# Patient Record
Sex: MALE | Race: BLACK OR AFRICAN AMERICAN | NOT HISPANIC OR LATINO
[De-identification: names, ages, dates, MRNs, and addresses within clinical notes are randomized per-mention and may not be internally consistent; named-entity substitution may affect disease eponyms.]

---

## 2017-06-23 ENCOUNTER — APPOINTMENT (OUTPATIENT)
Dept: INTERNAL MEDICINE | Facility: CLINIC | Age: 67
End: 2017-06-23

## 2017-06-23 ENCOUNTER — NON-APPOINTMENT (OUTPATIENT)
Age: 67
End: 2017-06-23

## 2017-06-23 VITALS
BODY MASS INDEX: 28.99 KG/M2 | HEIGHT: 72 IN | OXYGEN SATURATION: 94 % | WEIGHT: 214 LBS | TEMPERATURE: 98.1 F | HEART RATE: 74 BPM | RESPIRATION RATE: 12 BRPM | DIASTOLIC BLOOD PRESSURE: 93 MMHG | SYSTOLIC BLOOD PRESSURE: 174 MMHG

## 2017-06-23 LAB
ALBUMIN SERPL ELPH-MCNC: 4.3 G/DL
ALP BLD-CCNC: 85 U/L
ALT SERPL-CCNC: 30 U/L
ANION GAP SERPL CALC-SCNC: 12 MMOL/L
AST SERPL-CCNC: 31 U/L
BILIRUB SERPL-MCNC: 0.4 MG/DL
BUN SERPL-MCNC: 14 MG/DL
CALCIUM SERPL-MCNC: 9.5 MG/DL
CHLORIDE SERPL-SCNC: 102 MMOL/L
CO2 SERPL-SCNC: 27 MMOL/L
CREAT SERPL-MCNC: 0.97 MG/DL
GLUCOSE SERPL-MCNC: 105 MG/DL
POTASSIUM SERPL-SCNC: 5.2 MMOL/L
PROT SERPL-MCNC: 7.5 G/DL
SODIUM SERPL-SCNC: 141 MMOL/L

## 2017-06-26 LAB — TSH SERPL-ACNC: 2.18 UIU/ML

## 2017-12-22 ENCOUNTER — APPOINTMENT (OUTPATIENT)
Dept: INTERNAL MEDICINE | Facility: CLINIC | Age: 67
End: 2017-12-22
Payer: COMMERCIAL

## 2017-12-22 VITALS
RESPIRATION RATE: 12 BRPM | HEART RATE: 82 BPM | SYSTOLIC BLOOD PRESSURE: 188 MMHG | BODY MASS INDEX: 28.44 KG/M2 | TEMPERATURE: 97.9 F | WEIGHT: 210 LBS | OXYGEN SATURATION: 97 % | DIASTOLIC BLOOD PRESSURE: 112 MMHG | HEIGHT: 72 IN

## 2017-12-22 DIAGNOSIS — Z87.09 PERSONAL HISTORY OF OTHER DISEASES OF THE RESPIRATORY SYSTEM: ICD-10-CM

## 2017-12-22 PROCEDURE — 99214 OFFICE O/P EST MOD 30 MIN: CPT

## 2017-12-29 ENCOUNTER — APPOINTMENT (OUTPATIENT)
Dept: INTERNAL MEDICINE | Facility: CLINIC | Age: 67
End: 2017-12-29

## 2018-01-19 ENCOUNTER — NON-APPOINTMENT (OUTPATIENT)
Age: 68
End: 2018-01-19

## 2018-01-19 ENCOUNTER — APPOINTMENT (OUTPATIENT)
Dept: INTERNAL MEDICINE | Facility: CLINIC | Age: 68
End: 2018-01-19
Payer: COMMERCIAL

## 2018-01-19 VITALS
TEMPERATURE: 97.9 F | RESPIRATION RATE: 12 BRPM | DIASTOLIC BLOOD PRESSURE: 109 MMHG | HEIGHT: 72 IN | BODY MASS INDEX: 28.44 KG/M2 | SYSTOLIC BLOOD PRESSURE: 185 MMHG | OXYGEN SATURATION: 98 % | HEART RATE: 86 BPM | WEIGHT: 210 LBS

## 2018-01-19 PROCEDURE — 99214 OFFICE O/P EST MOD 30 MIN: CPT

## 2018-01-19 PROCEDURE — 93000 ELECTROCARDIOGRAM COMPLETE: CPT

## 2018-01-20 LAB
BASOPHILS # BLD AUTO: 0.02 K/UL
BASOPHILS NFR BLD AUTO: 0.5 %
EOSINOPHIL # BLD AUTO: 0.08 K/UL
EOSINOPHIL NFR BLD AUTO: 1.9 %
HCT VFR BLD CALC: 42.7 %
HGB BLD-MCNC: 14.3 G/DL
IMM GRANULOCYTES NFR BLD AUTO: 0 %
LYMPHOCYTES # BLD AUTO: 1.73 K/UL
LYMPHOCYTES NFR BLD AUTO: 41.1 %
MAN DIFF?: NORMAL
MCHC RBC-ENTMCNC: 29.9 PG
MCHC RBC-ENTMCNC: 33.5 GM/DL
MCV RBC AUTO: 89.1 FL
MONOCYTES # BLD AUTO: 0.58 K/UL
MONOCYTES NFR BLD AUTO: 13.8 %
NEUTROPHILS # BLD AUTO: 1.8 K/UL
NEUTROPHILS NFR BLD AUTO: 42.7 %
PLATELET # BLD AUTO: 188 K/UL
RBC # BLD: 4.79 M/UL
RBC # FLD: 15 %
WBC # FLD AUTO: 4.21 K/UL

## 2018-01-22 LAB
25(OH)D3 SERPL-MCNC: 10.1 NG/ML
ALBUMIN SERPL ELPH-MCNC: 4.4 G/DL
ALP BLD-CCNC: 73 U/L
ALT SERPL-CCNC: 57 U/L
ANION GAP SERPL CALC-SCNC: 13 MMOL/L
APPEARANCE: ABNORMAL
AST SERPL-CCNC: 63 U/L
BACTERIA: NEGATIVE
BILIRUB SERPL-MCNC: 0.6 MG/DL
BILIRUBIN URINE: NEGATIVE
BLOOD URINE: NEGATIVE
BUN SERPL-MCNC: 12 MG/DL
CALCIUM SERPL-MCNC: 9.8 MG/DL
CHLORIDE SERPL-SCNC: 101 MMOL/L
CHOLEST SERPL-MCNC: 306 MG/DL
CHOLEST/HDLC SERPL: 2.9 RATIO
CO2 SERPL-SCNC: 29 MMOL/L
COLOR: ABNORMAL
CREAT SERPL-MCNC: 0.9 MG/DL
GLUCOSE QUALITATIVE U: NEGATIVE MG/DL
GLUCOSE SERPL-MCNC: 111 MG/DL
HBA1C MFR BLD HPLC: 5.8 %
HDLC SERPL-MCNC: 105 MG/DL
HYALINE CASTS: 2 /LPF
KETONES URINE: NEGATIVE
LDLC SERPL CALC-MCNC: 187 MG/DL
LEUKOCYTE ESTERASE URINE: NEGATIVE
MICROSCOPIC-UA: NORMAL
NITRITE URINE: NEGATIVE
PH URINE: 6
POTASSIUM SERPL-SCNC: 4.4 MMOL/L
PROT SERPL-MCNC: 7.8 G/DL
PROTEIN URINE: ABNORMAL MG/DL
PSA SERPL-MCNC: 1.18 NG/ML
RED BLOOD CELLS URINE: 3 /HPF
SODIUM SERPL-SCNC: 143 MMOL/L
SPECIFIC GRAVITY URINE: 1.03
SQUAMOUS EPITHELIAL CELLS: 1 /HPF
TRIGL SERPL-MCNC: 68 MG/DL
TSH SERPL-ACNC: 2.13 UIU/ML
UROBILINOGEN URINE: NEGATIVE MG/DL
VIT B12 SERPL-MCNC: 580 PG/ML
WHITE BLOOD CELLS URINE: 1 /HPF

## 2018-02-07 ENCOUNTER — APPOINTMENT (OUTPATIENT)
Dept: CARDIOLOGY | Facility: CLINIC | Age: 68
End: 2018-02-07
Payer: COMMERCIAL

## 2018-02-07 ENCOUNTER — NON-APPOINTMENT (OUTPATIENT)
Age: 68
End: 2018-02-07

## 2018-02-07 ENCOUNTER — APPOINTMENT (OUTPATIENT)
Dept: INTERNAL MEDICINE | Facility: CLINIC | Age: 68
End: 2018-02-07
Payer: COMMERCIAL

## 2018-02-07 VITALS
WEIGHT: 211 LBS | DIASTOLIC BLOOD PRESSURE: 90 MMHG | OXYGEN SATURATION: 96 % | SYSTOLIC BLOOD PRESSURE: 163 MMHG | HEART RATE: 66 BPM | BODY MASS INDEX: 28.58 KG/M2 | TEMPERATURE: 97.8 F | RESPIRATION RATE: 12 BRPM | HEIGHT: 72 IN

## 2018-02-07 VITALS — DIASTOLIC BLOOD PRESSURE: 90 MMHG | HEART RATE: 54 BPM | SYSTOLIC BLOOD PRESSURE: 150 MMHG

## 2018-02-07 PROCEDURE — 93306 TTE W/DOPPLER COMPLETE: CPT

## 2018-02-07 PROCEDURE — 99244 OFF/OP CNSLTJ NEW/EST MOD 40: CPT | Mod: 25

## 2018-02-07 PROCEDURE — 93000 ELECTROCARDIOGRAM COMPLETE: CPT

## 2018-02-07 PROCEDURE — 99214 OFFICE O/P EST MOD 30 MIN: CPT

## 2018-02-15 LAB
ALBUMIN SERPL ELPH-MCNC: 4.4 G/DL
ALP BLD-CCNC: 79 U/L
ALT SERPL-CCNC: 36 U/L
ANION GAP SERPL CALC-SCNC: 16 MMOL/L
AST SERPL-CCNC: 31 U/L
BILIRUB SERPL-MCNC: 0.5 MG/DL
BUN SERPL-MCNC: 12 MG/DL
CALCIUM SERPL-MCNC: 10 MG/DL
CHLORIDE SERPL-SCNC: 100 MMOL/L
CO2 SERPL-SCNC: 25 MMOL/L
CREAT SERPL-MCNC: 0.97 MG/DL
GLUCOSE SERPL-MCNC: 106 MG/DL
HAV IGM SER QL: NONREACTIVE
HBV CORE IGM SER QL: NONREACTIVE
HBV SURFACE AB SER QL: REACTIVE
HBV SURFACE AG SER QL: NONREACTIVE
HCV AB SER QL: NONREACTIVE
HCV S/CO RATIO: 0.11 S/CO
POTASSIUM SERPL-SCNC: 4.6 MMOL/L
PROT SERPL-MCNC: 7.3 G/DL
SODIUM SERPL-SCNC: 141 MMOL/L

## 2018-02-16 ENCOUNTER — APPOINTMENT (OUTPATIENT)
Dept: CARDIOLOGY | Facility: CLINIC | Age: 68
End: 2018-02-16
Payer: COMMERCIAL

## 2018-02-16 PROCEDURE — 93978 VASCULAR STUDY: CPT

## 2018-02-23 ENCOUNTER — APPOINTMENT (OUTPATIENT)
Dept: INTERNAL MEDICINE | Facility: CLINIC | Age: 68
End: 2018-02-23
Payer: COMMERCIAL

## 2018-02-23 VITALS
SYSTOLIC BLOOD PRESSURE: 162 MMHG | HEART RATE: 77 BPM | HEIGHT: 72 IN | TEMPERATURE: 98 F | DIASTOLIC BLOOD PRESSURE: 87 MMHG | RESPIRATION RATE: 12 BRPM | OXYGEN SATURATION: 99 %

## 2018-02-23 DIAGNOSIS — M25.552 PAIN IN LEFT HIP: ICD-10-CM

## 2018-02-23 PROCEDURE — 99214 OFFICE O/P EST MOD 30 MIN: CPT

## 2018-02-23 RX ORDER — BENZONATATE 200 MG/1
200 CAPSULE ORAL
Qty: 20 | Refills: 0 | Status: DISCONTINUED | COMMUNITY
Start: 2017-12-22 | End: 2018-02-23

## 2018-02-23 RX ORDER — FLUTICASONE PROPIONATE 50 UG/1
50 SPRAY, METERED NASAL DAILY
Qty: 1 | Refills: 3 | Status: DISCONTINUED | COMMUNITY
Start: 2017-12-22 | End: 2018-02-23

## 2018-02-23 RX ORDER — LEVOFLOXACIN 500 MG/1
500 TABLET, FILM COATED ORAL DAILY
Qty: 10 | Refills: 0 | Status: DISCONTINUED | COMMUNITY
Start: 2017-06-23 | End: 2018-02-23

## 2018-02-23 RX ORDER — BENZONATATE 200 MG/1
200 CAPSULE ORAL
Qty: 20 | Refills: 0 | Status: DISCONTINUED | COMMUNITY
Start: 2017-06-23 | End: 2018-02-23

## 2018-02-23 RX ORDER — LEVOFLOXACIN 500 MG/1
500 TABLET, FILM COATED ORAL
Qty: 7 | Refills: 0 | Status: DISCONTINUED | COMMUNITY
Start: 2017-12-22 | End: 2018-02-23

## 2018-03-05 ENCOUNTER — MEDICATION RENEWAL (OUTPATIENT)
Age: 68
End: 2018-03-05

## 2018-03-05 LAB
ANION GAP SERPL CALC-SCNC: 10 MMOL/L
BUN SERPL-MCNC: 11 MG/DL
CALCIUM SERPL-MCNC: 9.7 MG/DL
CHLORIDE SERPL-SCNC: 100 MMOL/L
CO2 SERPL-SCNC: 29 MMOL/L
CREAT SERPL-MCNC: 0.97 MG/DL
GLUCOSE SERPL-MCNC: 105 MG/DL
POTASSIUM SERPL-SCNC: 4 MMOL/L
SODIUM SERPL-SCNC: 139 MMOL/L

## 2018-03-07 ENCOUNTER — MEDICATION RENEWAL (OUTPATIENT)
Age: 68
End: 2018-03-07

## 2018-04-11 ENCOUNTER — RX RENEWAL (OUTPATIENT)
Age: 68
End: 2018-04-11

## 2018-04-20 ENCOUNTER — RX RENEWAL (OUTPATIENT)
Age: 68
End: 2018-04-20

## 2018-04-25 ENCOUNTER — RX RENEWAL (OUTPATIENT)
Age: 68
End: 2018-04-25

## 2018-05-16 ENCOUNTER — RX RENEWAL (OUTPATIENT)
Age: 68
End: 2018-05-16

## 2018-05-16 ENCOUNTER — MEDICATION RENEWAL (OUTPATIENT)
Age: 68
End: 2018-05-16

## 2018-06-20 ENCOUNTER — MEDICATION RENEWAL (OUTPATIENT)
Age: 68
End: 2018-06-20

## 2018-07-04 ENCOUNTER — NON-APPOINTMENT (OUTPATIENT)
Age: 68
End: 2018-07-04

## 2018-07-05 ENCOUNTER — APPOINTMENT (OUTPATIENT)
Dept: CARDIOLOGY | Facility: CLINIC | Age: 68
End: 2018-07-05

## 2018-07-05 ENCOUNTER — APPOINTMENT (OUTPATIENT)
Dept: INTERNAL MEDICINE | Facility: CLINIC | Age: 68
End: 2018-07-05

## 2018-07-16 ENCOUNTER — RX RENEWAL (OUTPATIENT)
Age: 68
End: 2018-07-16

## 2018-07-26 ENCOUNTER — RX RENEWAL (OUTPATIENT)
Age: 68
End: 2018-07-26

## 2018-08-20 ENCOUNTER — MEDICATION RENEWAL (OUTPATIENT)
Age: 68
End: 2018-08-20

## 2018-10-24 ENCOUNTER — MEDICATION RENEWAL (OUTPATIENT)
Age: 68
End: 2018-10-24

## 2018-12-07 ENCOUNTER — APPOINTMENT (OUTPATIENT)
Dept: INTERNAL MEDICINE | Facility: CLINIC | Age: 68
End: 2018-12-07
Payer: COMMERCIAL

## 2018-12-07 VITALS
OXYGEN SATURATION: 96 % | TEMPERATURE: 97.6 F | SYSTOLIC BLOOD PRESSURE: 140 MMHG | WEIGHT: 215 LBS | BODY MASS INDEX: 29.12 KG/M2 | RESPIRATION RATE: 12 BRPM | HEART RATE: 95 BPM | DIASTOLIC BLOOD PRESSURE: 90 MMHG | HEIGHT: 72 IN

## 2018-12-07 PROCEDURE — 99214 OFFICE O/P EST MOD 30 MIN: CPT

## 2018-12-24 NOTE — REVIEW OF SYSTEMS
[Nasal Discharge] : nasal discharge [Sore Throat] : sore throat [Shortness Of Breath] : no shortness of breath [Wheezing] : no wheezing [Cough] : cough [Joint Pain] : joint pain [Negative] : Heme/Lymph

## 2018-12-24 NOTE — PHYSICAL EXAM
[No Acute Distress] : no acute distress [Well Nourished] : well nourished [Well Developed] : well developed [Well-Appearing] : well-appearing [Normal Sclera/Conjunctiva] : normal sclera/conjunctiva [EOMI] : extraocular movements intact [Normal Outer Ear/Nose] : the outer ears and nose were normal in appearance [Normal Oropharynx] : the oropharynx was normal [Normal TMs] : both tympanic membranes were normal [No JVD] : no jugular venous distention [Supple] : supple [No Lymphadenopathy] : no lymphadenopathy [No Respiratory Distress] : no respiratory distress  [No Accessory Muscle Use] : no accessory muscle use [Normal Rate] : normal rate  [Regular Rhythm] : with a regular rhythm [Normal S1, S2] : normal S1 and S2 [No Murmur] : no murmur heard [No Carotid Bruits] : no carotid bruits [Pedal Pulses Present] : the pedal pulses are present [No Edema] : there was no peripheral edema [Soft] : abdomen soft [Non Tender] : non-tender [Non-distended] : non-distended [No Masses] : no abdominal mass palpated [No HSM] : no HSM [Normal Bowel Sounds] : normal bowel sounds [Normal Posterior Cervical Nodes] : no posterior cervical lymphadenopathy [Normal Anterior Cervical Nodes] : no anterior cervical lymphadenopathy [No CVA Tenderness] : no CVA  tenderness [No Spinal Tenderness] : no spinal tenderness [No Joint Swelling] : no joint swelling [No Rash] : no rash [Normal Gait] : normal gait [No Focal Deficits] : no focal deficits [Normal Affect] : the affect was normal [Normal Insight/Judgement] : insight and judgment were intact [de-identified] : lynn PORTER  [de-identified] : left ankle in boot

## 2018-12-24 NOTE — HISTORY OF PRESENT ILLNESS
[de-identified] : 68 year old male with h/o Hypertension presents with acute URI x 1 week: + sore throat, dry cough ,nasal congestion, + associated low grade fever> symptoms getting worse at night .Takes OTC decongestant without much improvement. \par Pt recovering from Rt ankle fracture May 2018 currently in  boot .

## 2018-12-24 NOTE — PLAN
[FreeTextEntry1] : 1. see plan\par 2. Hypertension\par continue Amlodipine 5 mg QD\par Losartan 25 mg QD

## 2018-12-24 NOTE — COUNSELING
[Weight management counseling provided] : Weight management [Healthy eating counseling provided] : healthy eating [Activity counseling provided] : activity [Low Salt Diet] : Low salt diet

## 2019-01-25 ENCOUNTER — APPOINTMENT (OUTPATIENT)
Dept: INTERNAL MEDICINE | Facility: CLINIC | Age: 69
End: 2019-01-25
Payer: COMMERCIAL

## 2019-01-25 VITALS
DIASTOLIC BLOOD PRESSURE: 84 MMHG | OXYGEN SATURATION: 98 % | WEIGHT: 215 LBS | HEART RATE: 104 BPM | HEIGHT: 72 IN | TEMPERATURE: 97.8 F | BODY MASS INDEX: 29.12 KG/M2 | SYSTOLIC BLOOD PRESSURE: 154 MMHG | RESPIRATION RATE: 14 BRPM

## 2019-01-25 DIAGNOSIS — J06.9 ACUTE UPPER RESPIRATORY INFECTION, UNSPECIFIED: ICD-10-CM

## 2019-01-25 DIAGNOSIS — J45.909 UNSPECIFIED ASTHMA, UNCOMPLICATED: ICD-10-CM

## 2019-01-25 PROCEDURE — 99214 OFFICE O/P EST MOD 30 MIN: CPT

## 2019-01-25 NOTE — PHYSICAL EXAM
[No Acute Distress] : no acute distress [Well Nourished] : well nourished [Well Developed] : well developed [Normal Sclera/Conjunctiva] : normal sclera/conjunctiva [EOMI] : extraocular movements intact [Normal Outer Ear/Nose] : the outer ears and nose were normal in appearance [Normal TMs] : both tympanic membranes were normal [No JVD] : no jugular venous distention [Supple] : supple [No Lymphadenopathy] : no lymphadenopathy [No Respiratory Distress] : no respiratory distress  [No Accessory Muscle Use] : no accessory muscle use [Normal Rate] : normal rate  [Regular Rhythm] : with a regular rhythm [Normal S1, S2] : normal S1 and S2 [No Murmur] : no murmur heard [No Carotid Bruits] : no carotid bruits [Pedal Pulses Present] : the pedal pulses are present [No Edema] : there was no peripheral edema [Soft] : abdomen soft [Non Tender] : non-tender [Non-distended] : non-distended [No Masses] : no abdominal mass palpated [No HSM] : no HSM [Normal Bowel Sounds] : normal bowel sounds [Normal Posterior Cervical Nodes] : no posterior cervical lymphadenopathy [Normal Anterior Cervical Nodes] : no anterior cervical lymphadenopathy [No CVA Tenderness] : no CVA  tenderness [No Spinal Tenderness] : no spinal tenderness [No Joint Swelling] : no joint swelling [No Rash] : no rash [Normal Gait] : normal gait [No Focal Deficits] : no focal deficits [Normal Affect] : the affect was normal [Normal Insight/Judgement] : insight and judgment were intact [de-identified] : ill loooking [de-identified] : + throat erythema  [de-identified] : + scattered rhonchi/ wheezing  [de-identified] : left ankle in boot

## 2019-01-25 NOTE — REVIEW OF SYSTEMS
[Nasal Discharge] : nasal discharge [Shortness Of Breath] : no shortness of breath [Wheezing] : wheezing [Cough] : cough [Joint Pain] : joint pain [Negative] : Heme/Lymph

## 2019-01-25 NOTE — HISTORY OF PRESENT ILLNESS
[de-identified] : 68 year old male with hypertension presents with acute URI x 1 week : + cough productive of dark sputum associated with chest tightness/ wheezing, + associated body aches, low grade fever, chills, runny nose . He takes Tylenol prn without much improvement. Symptoms getting worse at night. He denies SOB, N, V, abdominal pain, CP

## 2019-04-12 ENCOUNTER — MEDICATION RENEWAL (OUTPATIENT)
Age: 69
End: 2019-04-12

## 2019-06-24 ENCOUNTER — CLINICAL ADVICE (OUTPATIENT)
Age: 69
End: 2019-06-24

## 2019-10-03 ENCOUNTER — APPOINTMENT (OUTPATIENT)
Dept: INTERNAL MEDICINE | Facility: CLINIC | Age: 69
End: 2019-10-03
Payer: COMMERCIAL

## 2019-10-03 ENCOUNTER — CHART COPY (OUTPATIENT)
Age: 69
End: 2019-10-03

## 2019-10-03 ENCOUNTER — NON-APPOINTMENT (OUTPATIENT)
Age: 69
End: 2019-10-03

## 2019-10-03 VITALS
BODY MASS INDEX: 28.04 KG/M2 | HEART RATE: 102 BPM | TEMPERATURE: 98.3 F | OXYGEN SATURATION: 96 % | SYSTOLIC BLOOD PRESSURE: 160 MMHG | WEIGHT: 207 LBS | RESPIRATION RATE: 14 BRPM | HEIGHT: 72 IN | DIASTOLIC BLOOD PRESSURE: 102 MMHG

## 2019-10-03 PROCEDURE — 99214 OFFICE O/P EST MOD 30 MIN: CPT

## 2019-10-03 RX ORDER — MOMETASONE 50 UG/1
50 SPRAY, METERED NASAL DAILY
Qty: 1 | Refills: 1 | Status: DISCONTINUED | COMMUNITY
Start: 2017-06-23 | End: 2019-10-03

## 2019-10-03 RX ORDER — AMOXICILLIN AND CLAVULANATE POTASSIUM 875; 125 MG/1; MG/1
875-125 TABLET, COATED ORAL
Qty: 14 | Refills: 0 | Status: DISCONTINUED | COMMUNITY
Start: 2018-12-07 | End: 2019-10-03

## 2019-10-03 RX ORDER — AMOXICILLIN AND CLAVULANATE POTASSIUM 875; 125 MG/1; MG/1
875-125 TABLET, COATED ORAL
Qty: 14 | Refills: 0 | Status: DISCONTINUED | COMMUNITY
Start: 2019-01-25 | End: 2019-10-03

## 2019-10-03 RX ORDER — MELOXICAM 7.5 MG/1
7.5 TABLET ORAL DAILY
Qty: 90 | Refills: 0 | Status: DISCONTINUED | COMMUNITY
Start: 2018-02-23 | End: 2019-10-03

## 2019-10-03 RX ORDER — METHYLPREDNISOLONE 4 MG/1
4 TABLET ORAL
Qty: 1 | Refills: 0 | Status: DISCONTINUED | COMMUNITY
Start: 2019-01-25 | End: 2019-10-03

## 2019-10-14 DIAGNOSIS — R79.89 OTHER SPECIFIED ABNORMAL FINDINGS OF BLOOD CHEMISTRY: ICD-10-CM

## 2019-10-14 LAB
25(OH)D3 SERPL-MCNC: 11.5 NG/ML
ALBUMIN SERPL ELPH-MCNC: 4.6 G/DL
ALP BLD-CCNC: 80 U/L
ALT SERPL-CCNC: 30 U/L
ANION GAP SERPL CALC-SCNC: 17 MMOL/L
APPEARANCE: CLEAR
AST SERPL-CCNC: 41 U/L
BACTERIA: NEGATIVE
BASOPHILS # BLD AUTO: 0.04 K/UL
BASOPHILS NFR BLD AUTO: 1 %
BILIRUB SERPL-MCNC: 0.6 MG/DL
BILIRUBIN URINE: NEGATIVE
BLOOD URINE: NORMAL
BUN SERPL-MCNC: 10 MG/DL
CALCIUM SERPL-MCNC: 9.7 MG/DL
CHLORIDE SERPL-SCNC: 98 MMOL/L
CHOLEST SERPL-MCNC: 329 MG/DL
CHOLEST/HDLC SERPL: 3.1 RATIO
CO2 SERPL-SCNC: 25 MMOL/L
COLOR: YELLOW
CREAT SERPL-MCNC: 0.8 MG/DL
EOSINOPHIL # BLD AUTO: 0.1 K/UL
EOSINOPHIL NFR BLD AUTO: 2.4 %
ESTIMATED AVERAGE GLUCOSE: 120 MG/DL
GLUCOSE QUALITATIVE U: NEGATIVE
GLUCOSE SERPL-MCNC: 117 MG/DL
HBA1C MFR BLD HPLC: 5.8 %
HCT VFR BLD CALC: 44.9 %
HDLC SERPL-MCNC: 108 MG/DL
HGB BLD-MCNC: 14.6 G/DL
HYALINE CASTS: 0 /LPF
IMM GRANULOCYTES NFR BLD AUTO: 0.2 %
KETONES URINE: NEGATIVE
LDLC SERPL CALC-MCNC: 193 MG/DL
LEUKOCYTE ESTERASE URINE: NEGATIVE
LYMPHOCYTES # BLD AUTO: 1.82 K/UL
LYMPHOCYTES NFR BLD AUTO: 43.8 %
MAN DIFF?: NORMAL
MCHC RBC-ENTMCNC: 30.2 PG
MCHC RBC-ENTMCNC: 32.5 GM/DL
MCV RBC AUTO: 93 FL
MICROSCOPIC-UA: NORMAL
MONOCYTES # BLD AUTO: 0.58 K/UL
MONOCYTES NFR BLD AUTO: 13.9 %
NEUTROPHILS # BLD AUTO: 1.61 K/UL
NEUTROPHILS NFR BLD AUTO: 38.7 %
NITRITE URINE: NEGATIVE
PH URINE: 6
PLATELET # BLD AUTO: 218 K/UL
POTASSIUM SERPL-SCNC: 4.2 MMOL/L
PROT SERPL-MCNC: 7.3 G/DL
PROTEIN URINE: NORMAL
PSA SERPL-MCNC: 1.36 NG/ML
RBC # BLD: 4.83 M/UL
RBC # FLD: 13.6 %
RED BLOOD CELLS URINE: 3 /HPF
SODIUM SERPL-SCNC: 140 MMOL/L
SPECIFIC GRAVITY URINE: 1.02
SQUAMOUS EPITHELIAL CELLS: 0 /HPF
TRIGL SERPL-MCNC: 140 MG/DL
TSH SERPL-ACNC: 1.93 UIU/ML
UROBILINOGEN URINE: NORMAL
VIT B12 SERPL-MCNC: 265 PG/ML
WBC # FLD AUTO: 4.16 K/UL
WHITE BLOOD CELLS URINE: 0 /HPF

## 2019-10-14 RX ORDER — CHOLECALCIFEROL (VITAMIN D3) 1250 MCG
1.25 MG CAPSULE ORAL
Qty: 6 | Refills: 0 | Status: ACTIVE | COMMUNITY
Start: 2019-10-14 | End: 1900-01-01

## 2019-10-15 NOTE — PHYSICAL EXAM
[No Acute Distress] : no acute distress [Well Nourished] : well nourished [Well Developed] : well developed [Well-Appearing] : well-appearing [Normal Sclera/Conjunctiva] : normal sclera/conjunctiva [EOMI] : extraocular movements intact [Normal Outer Ear/Nose] : the outer ears and nose were normal in appearance [Normal Oropharynx] : the oropharynx was normal [No JVD] : no jugular venous distention [No Lymphadenopathy] : no lymphadenopathy [Supple] : supple [Thyroid Normal, No Nodules] : the thyroid was normal and there were no nodules present [No Respiratory Distress] : no respiratory distress  [No Accessory Muscle Use] : no accessory muscle use [Clear to Auscultation] : lungs were clear to auscultation bilaterally [Normal Rate] : normal rate  [Regular Rhythm] : with a regular rhythm [No Murmur] : no murmur heard [Normal S1, S2] : normal S1 and S2 [No Carotid Bruits] : no carotid bruits [Pedal Pulses Present] : the pedal pulses are present [No Edema] : there was no peripheral edema [Soft] : abdomen soft [Non Tender] : non-tender [Non-distended] : non-distended [No Masses] : no abdominal mass palpated [No HSM] : no HSM [Normal Bowel Sounds] : normal bowel sounds [Normal Posterior Cervical Nodes] : no posterior cervical lymphadenopathy [Normal Anterior Cervical Nodes] : no anterior cervical lymphadenopathy [No CVA Tenderness] : no CVA  tenderness [No Spinal Tenderness] : no spinal tenderness [No Joint Swelling] : no joint swelling [No Rash] : no rash [Grossly Normal Strength/Tone] : grossly normal strength/tone [Coordination Grossly Intact] : coordination grossly intact [No Focal Deficits] : no focal deficits [Normal Gait] : normal gait [Normal Insight/Judgement] : insight and judgment were intact [Normal Affect] : the affect was normal

## 2019-10-15 NOTE — HISTORY OF PRESENT ILLNESS
[de-identified] : 69 year old male with Hypertension / Abn LFT/ Seasonal allergies  presents for follow up. \par He is not compliant with regular follow ups.\par He reports feeling well, denies CP/SOB, dizziness, exertional symptoms\par Last colonoscopy  4-5 years ago

## 2019-11-02 ENCOUNTER — APPOINTMENT (OUTPATIENT)
Dept: CARDIOLOGY | Facility: CLINIC | Age: 69
End: 2019-11-02

## 2019-11-04 RX ORDER — CHOLECALCIFEROL (VITAMIN D3) 50 MCG
50 MCG TABLET ORAL
Qty: 90 | Refills: 0 | Status: ACTIVE | COMMUNITY
Start: 2019-11-04 | End: 1900-01-01

## 2019-11-25 ENCOUNTER — MEDICATION RENEWAL (OUTPATIENT)
Age: 69
End: 2019-11-25

## 2020-04-15 ENCOUNTER — APPOINTMENT (OUTPATIENT)
Dept: INTERNAL MEDICINE | Facility: CLINIC | Age: 70
End: 2020-04-15

## 2020-04-21 ENCOUNTER — RX CHANGE (OUTPATIENT)
Age: 70
End: 2020-04-21

## 2020-04-21 RX ORDER — LOSARTAN POTASSIUM 50 MG/1
50 TABLET, FILM COATED ORAL DAILY
Qty: 90 | Refills: 0 | Status: DISCONTINUED | COMMUNITY
Start: 2018-01-19 | End: 2020-04-21

## 2020-05-19 ENCOUNTER — RX RENEWAL (OUTPATIENT)
Age: 70
End: 2020-05-19

## 2020-05-21 ENCOUNTER — RX CHANGE (OUTPATIENT)
Age: 70
End: 2020-05-21

## 2020-06-11 ENCOUNTER — APPOINTMENT (OUTPATIENT)
Dept: INTERNAL MEDICINE | Facility: CLINIC | Age: 70
End: 2020-06-11
Payer: COMMERCIAL

## 2020-06-11 PROCEDURE — 99214 OFFICE O/P EST MOD 30 MIN: CPT | Mod: 95

## 2020-06-11 RX ORDER — MOMETASONE 50 UG/1
50 SPRAY, METERED NASAL DAILY
Qty: 1 | Refills: 0 | Status: DISCONTINUED | COMMUNITY
Start: 2019-01-25 | End: 2020-06-11

## 2020-06-11 RX ORDER — ALBUTEROL SULFATE 108 UG/1
108 (90 BASE) AEROSOL, METERED RESPIRATORY (INHALATION)
Qty: 1 | Refills: 1 | Status: DISCONTINUED | COMMUNITY
Start: 2019-01-25 | End: 2020-06-11

## 2020-06-11 RX ORDER — MOMETASONE 50 UG/1
50 SPRAY, METERED NASAL DAILY
Qty: 1 | Refills: 0 | Status: DISCONTINUED | COMMUNITY
Start: 2018-12-07 | End: 2020-06-11

## 2020-06-12 NOTE — PLAN
[FreeTextEntry1] : 1. see plan\par 2. sore throat\par salt water gurgles\par pt instructed to call back if symptoms worsen or no improvement\par pt instructed to come for follow up on Htn and blood work

## 2020-06-12 NOTE — HISTORY OF PRESENT ILLNESS
[Home] : at home, [unfilled] , at the time of the visit. [Medical Office: ([facility])___] : at the medical office located in  [de-identified] : 69 year old male with Hypertension / Abn LFT/ Seasonal allergies  presents for follow up on chronic problems and meds renewal \par He is not compliant with regular follow ups.\par Pt was recently treated for sore throat in [place], still has mild sore throat, denies associated fever, chills, cough \par He  denies CP/SOB, dizziness, exertional symptoms

## 2020-06-17 ENCOUNTER — NON-APPOINTMENT (OUTPATIENT)
Age: 70
End: 2020-06-17

## 2020-06-17 ENCOUNTER — APPOINTMENT (OUTPATIENT)
Dept: INTERNAL MEDICINE | Facility: CLINIC | Age: 70
End: 2020-06-17
Payer: COMMERCIAL

## 2020-06-17 ENCOUNTER — APPOINTMENT (OUTPATIENT)
Dept: PULMONOLOGY | Facility: CLINIC | Age: 70
End: 2020-06-17
Payer: COMMERCIAL

## 2020-06-17 VITALS — SYSTOLIC BLOOD PRESSURE: 150 MMHG | DIASTOLIC BLOOD PRESSURE: 80 MMHG

## 2020-06-17 VITALS
WEIGHT: 208 LBS | RESPIRATION RATE: 14 BRPM | OXYGEN SATURATION: 97 % | SYSTOLIC BLOOD PRESSURE: 149 MMHG | TEMPERATURE: 98.9 F | DIASTOLIC BLOOD PRESSURE: 83 MMHG | HEIGHT: 72 IN | HEART RATE: 102 BPM | BODY MASS INDEX: 28.17 KG/M2

## 2020-06-17 DIAGNOSIS — Z72.89 OTHER PROBLEMS RELATED TO LIFESTYLE: ICD-10-CM

## 2020-06-17 DIAGNOSIS — Z87.09 PERSONAL HISTORY OF OTHER DISEASES OF THE RESPIRATORY SYSTEM: ICD-10-CM

## 2020-06-17 PROCEDURE — 95012 NITRIC OXIDE EXP GAS DETER: CPT

## 2020-06-17 PROCEDURE — 99214 OFFICE O/P EST MOD 30 MIN: CPT | Mod: 25

## 2020-06-17 PROCEDURE — 99214 OFFICE O/P EST MOD 30 MIN: CPT

## 2020-06-17 PROCEDURE — 94060 EVALUATION OF WHEEZING: CPT

## 2020-06-17 PROCEDURE — 94726 PLETHYSMOGRAPHY LUNG VOLUMES: CPT

## 2020-06-17 PROCEDURE — 94729 DIFFUSING CAPACITY: CPT

## 2020-06-17 RX ORDER — CHLORHEXIDINE GLUCONATE, 0.12% ORAL RINSE 1.2 MG/ML
0.12 SOLUTION DENTAL
Qty: 1 | Refills: 0 | Status: ACTIVE | COMMUNITY
Start: 2020-06-17 | End: 1900-01-01

## 2020-06-17 NOTE — HISTORY OF PRESENT ILLNESS
[TextBox_4] : Patient is a 69-year-old noncompliant male with past medical history significant for hypertension high cholesterol who is referred for pulmonary consult. The patient complained of increasing cough shortness of breath and dyspnea on exertion. He currently uses Primatene Mist as his rescue medication. He also complains of sore throat. He denies fevers chills chest pain weight loss or hemoptysis

## 2020-06-17 NOTE — ASSESSMENT
[FreeTextEntry1] : In summary the patient is a 69-year-old male with hypertension cholesterol was referred for consult. Patient's physical exam is within normal limits. The patient underwent a pulmonary function test which revealed normal lung volumes.\par \par FeNO 15PPB\par Patient started on Advair and Ventolin although his symptoms are most likely secondary to non compliance\par CXR ordered\par Follow up in 1 month

## 2020-06-17 NOTE — PHYSICAL EXAM
[No Acute Distress] : no acute distress [Normal Oropharynx] : normal oropharynx [Normal Appearance] : normal appearance [No Neck Mass] : no neck mass [Normal Rate/Rhythm] : normal rate/rhythm [Normal S1, S2] : normal s1, s2 [No Murmurs] : no murmurs [No Resp Distress] : no resp distress [Clear to Auscultation Bilaterally] : clear to auscultation bilaterally [No Abnormalities] : no abnormalities [Benign] : benign [Normal Gait] : normal gait [No Clubbing] : no clubbing [No Cyanosis] : no cyanosis [No Edema] : no edema [FROM] : FROM no [Normal Color/ Pigmentation] : normal color/ pigmentation [No Focal Deficits] : no focal deficits [Oriented x3] : oriented x3 [Normal Affect] : normal affect

## 2020-06-17 NOTE — REVIEW OF SYSTEMS
[Postnasal Drip] : postnasal drip [Cough] : cough [Dyspnea] : dyspnea [A.M. Dry Mouth] : a.m. dry mouth [SOB on Exertion] : sob on exertion [Negative] : Endocrine [Sputum] : no sputum

## 2020-06-22 NOTE — HISTORY OF PRESENT ILLNESS
[de-identified] : 69 year old male with h/o Htn/ Ab LFT/ Seasonal allergies presents for follow up on chronic problems . Also c/o persistent cough associated with SOB, sore throat. He currently uses OTC Primatene Mist . He denies feve, chills, N, V, abdominal pain . He is not compliant with daily BP meds intake

## 2020-06-22 NOTE — PHYSICAL EXAM
[No Acute Distress] : no acute distress [Well Nourished] : well nourished [Well Developed] : well developed [Well-Appearing] : well-appearing [EOMI] : extraocular movements intact [Normal Sclera/Conjunctiva] : normal sclera/conjunctiva [Normal Outer Ear/Nose] : the outer ears and nose were normal in appearance [No Lymphadenopathy] : no lymphadenopathy [Normal TMs] : both tympanic membranes were normal [No JVD] : no jugular venous distention [Supple] : supple [Thyroid Normal, No Nodules] : the thyroid was normal and there were no nodules present [No Respiratory Distress] : no respiratory distress  [Clear to Auscultation] : lungs were clear to auscultation bilaterally [Regular Rhythm] : with a regular rhythm [Normal Rate] : normal rate  [No Accessory Muscle Use] : no accessory muscle use [No Murmur] : no murmur heard [Normal S1, S2] : normal S1 and S2 [No Carotid Bruits] : no carotid bruits [Pedal Pulses Present] : the pedal pulses are present [No Edema] : there was no peripheral edema [Non Tender] : non-tender [Soft] : abdomen soft [Non-distended] : non-distended [No Masses] : no abdominal mass palpated [Normal Bowel Sounds] : normal bowel sounds [Normal Posterior Cervical Nodes] : no posterior cervical lymphadenopathy [Normal Anterior Cervical Nodes] : no anterior cervical lymphadenopathy [No CVA Tenderness] : no CVA  tenderness [No Joint Swelling] : no joint swelling [No Spinal Tenderness] : no spinal tenderness [No Rash] : no rash [Grossly Normal Strength/Tone] : grossly normal strength/tone [Coordination Grossly Intact] : coordination grossly intact [Normal Gait] : normal gait [No Focal Deficits] : no focal deficits [Normal Affect] : the affect was normal [Normal Insight/Judgement] : insight and judgment were intact [de-identified] : + hroat erythema/ no exudate

## 2020-06-22 NOTE — REVIEW OF SYSTEMS
[Earache] : no earache [Cough] : cough [Sore Throat] : sore throat [Dyspnea on Exertion] : dyspnea on exertion [Negative] : Heme/Lymph

## 2020-06-25 LAB
25(OH)D3 SERPL-MCNC: 22.5 NG/ML
ALBUMIN SERPL ELPH-MCNC: 4.6 G/DL
ALP BLD-CCNC: 86 U/L
ALT SERPL-CCNC: 21 U/L
ANION GAP SERPL CALC-SCNC: 16 MMOL/L
APPEARANCE: CLEAR
AST SERPL-CCNC: 21 U/L
BACTERIA: NEGATIVE
BASOPHILS # BLD AUTO: 0.03 K/UL
BASOPHILS NFR BLD AUTO: 0.6 %
BILIRUB SERPL-MCNC: 1 MG/DL
BILIRUBIN URINE: NEGATIVE
BLOOD URINE: NORMAL
BUN SERPL-MCNC: 12 MG/DL
CALCIUM SERPL-MCNC: 10 MG/DL
CHLORIDE SERPL-SCNC: 99 MMOL/L
CHOLEST SERPL-MCNC: 211 MG/DL
CHOLEST/HDLC SERPL: 3.2 RATIO
CO2 SERPL-SCNC: 27 MMOL/L
COLOR: YELLOW
CREAT SERPL-MCNC: 1.05 MG/DL
EOSINOPHIL # BLD AUTO: 0.1 K/UL
EOSINOPHIL NFR BLD AUTO: 1.9 %
ESTIMATED AVERAGE GLUCOSE: 120 MG/DL
GLUCOSE QUALITATIVE U: NEGATIVE
GLUCOSE SERPL-MCNC: 134 MG/DL
HBA1C MFR BLD HPLC: 5.8 %
HCT VFR BLD CALC: 48.6 %
HDLC SERPL-MCNC: 66 MG/DL
HGB BLD-MCNC: 14.8 G/DL
HYALINE CASTS: 0 /LPF
IMM GRANULOCYTES NFR BLD AUTO: 0.2 %
KETONES URINE: NORMAL
LDLC SERPL CALC-MCNC: 105 MG/DL
LEUKOCYTE ESTERASE URINE: NEGATIVE
LYMPHOCYTES # BLD AUTO: 2.33 K/UL
LYMPHOCYTES NFR BLD AUTO: 43.6 %
MAN DIFF?: NORMAL
MCHC RBC-ENTMCNC: 27.3 PG
MCHC RBC-ENTMCNC: 30.5 GM/DL
MCV RBC AUTO: 89.5 FL
MICROSCOPIC-UA: NORMAL
MONOCYTES # BLD AUTO: 0.78 K/UL
MONOCYTES NFR BLD AUTO: 14.6 %
NEUTROPHILS # BLD AUTO: 2.09 K/UL
NEUTROPHILS NFR BLD AUTO: 39.1 %
NITRITE URINE: NEGATIVE
PH URINE: 6.5
PLATELET # BLD AUTO: 210 K/UL
POTASSIUM SERPL-SCNC: 4 MMOL/L
PROT SERPL-MCNC: 7.5 G/DL
PROTEIN URINE: ABNORMAL
RBC # BLD: 5.43 M/UL
RBC # FLD: 17.2 %
RED BLOOD CELLS URINE: 3 /HPF
SARS-COV-2 IGG SERPL IA-ACNC: 99.7 INDEX
SARS-COV-2 IGG SERPL QL IA: POSITIVE
SODIUM SERPL-SCNC: 142 MMOL/L
SPECIFIC GRAVITY URINE: 1.03
SQUAMOUS EPITHELIAL CELLS: 1 /HPF
TRIGL SERPL-MCNC: 201 MG/DL
TSH SERPL-ACNC: 3.04 UIU/ML
URINE COMMENTS: NORMAL
UROBILINOGEN URINE: ABNORMAL
VIT B12 SERPL-MCNC: 222 PG/ML
WBC # FLD AUTO: 5.34 K/UL
WHITE BLOOD CELLS URINE: 2 /HPF

## 2020-07-02 ENCOUNTER — APPOINTMENT (OUTPATIENT)
Dept: INTERNAL MEDICINE | Facility: CLINIC | Age: 70
End: 2020-07-02
Payer: COMMERCIAL

## 2020-07-02 VITALS
WEIGHT: 217 LBS | BODY MASS INDEX: 29.39 KG/M2 | SYSTOLIC BLOOD PRESSURE: 166 MMHG | DIASTOLIC BLOOD PRESSURE: 88 MMHG | OXYGEN SATURATION: 96 % | HEART RATE: 85 BPM | TEMPERATURE: 98.7 F | RESPIRATION RATE: 14 BRPM | HEIGHT: 72 IN

## 2020-07-02 VITALS — DIASTOLIC BLOOD PRESSURE: 90 MMHG | SYSTOLIC BLOOD PRESSURE: 144 MMHG

## 2020-07-02 DIAGNOSIS — Z87.09 PERSONAL HISTORY OF OTHER DISEASES OF THE RESPIRATORY SYSTEM: ICD-10-CM

## 2020-07-02 PROCEDURE — 99214 OFFICE O/P EST MOD 30 MIN: CPT | Mod: 25

## 2020-07-02 PROCEDURE — 96372 THER/PROPH/DIAG INJ SC/IM: CPT

## 2020-07-02 RX ORDER — CYANOCOBALAMIN 1000 UG/ML
1000 INJECTION INTRAMUSCULAR; SUBCUTANEOUS
Qty: 0 | Refills: 0 | Status: COMPLETED | OUTPATIENT
Start: 2020-07-02

## 2020-07-02 RX ADMIN — CYANOCOBALAMIN 0 MCG/ML: 1000 INJECTION, SOLUTION INTRAMUSCULAR at 00:00

## 2020-07-02 NOTE — PHYSICAL EXAM
[No Acute Distress] : no acute distress [Well Nourished] : well nourished [Well Developed] : well developed [EOMI] : extraocular movements intact [Well-Appearing] : well-appearing [Normal Sclera/Conjunctiva] : normal sclera/conjunctiva [Normal TMs] : both tympanic membranes were normal [Normal Outer Ear/Nose] : the outer ears and nose were normal in appearance [No JVD] : no jugular venous distention [Supple] : supple [No Lymphadenopathy] : no lymphadenopathy [Thyroid Normal, No Nodules] : the thyroid was normal and there were no nodules present [No Respiratory Distress] : no respiratory distress  [Clear to Auscultation] : lungs were clear to auscultation bilaterally [No Accessory Muscle Use] : no accessory muscle use [Regular Rhythm] : with a regular rhythm [Normal Rate] : normal rate  [Normal S1, S2] : normal S1 and S2 [Soft] : abdomen soft [No Edema] : there was no peripheral edema [Non Tender] : non-tender [Non-distended] : non-distended [Normal Posterior Cervical Nodes] : no posterior cervical lymphadenopathy [Normal Bowel Sounds] : normal bowel sounds [Normal Anterior Cervical Nodes] : no anterior cervical lymphadenopathy [No Spinal Tenderness] : no spinal tenderness [No CVA Tenderness] : no CVA  tenderness [No Joint Swelling] : no joint swelling [Grossly Normal Strength/Tone] : grossly normal strength/tone [No Rash] : no rash [No Focal Deficits] : no focal deficits [Coordination Grossly Intact] : coordination grossly intact [Normal Affect] : the affect was normal [Normal Insight/Judgement] : insight and judgment were intact [Normal Gait] : normal gait

## 2020-07-06 NOTE — HISTORY OF PRESENT ILLNESS
[de-identified] : \par 69 year old male with h/o Htn/ Ab LFT/ Seasonal allergies presents for follow up on chronic problems. He reports his sore throat and cough have been feeling better, he had a CXR that showed no acute disease. He reports that he has been taking his medications daily. He denies any chest pain, shortness of breath, palpitations, headaches/dizziness, fever/chills, N/V/ abdominal pain.

## 2020-07-06 NOTE — PLAN
[FreeTextEntry1] : \par 1. Vitamin B12 Deficiency\par follow up bloodwork today\par Vitamin B12 injection\par decrease alcohol consumption\par gastroenterology referral given\par \par 2. Hypertension\par Increase Losartan to 100mg daily\par continue Amlodipine 5mg daily\par low sodium diet\par follow up with cardiology\par \par 3. Hyperlipemia\par continue Tricor 54mg daily\par low fat/low cholesterol diet\par \par 4. Sore Throat/ Hoarseness\par ENT referral given

## 2020-07-07 LAB
FOLATE RBC-MCNC: 927 NG/ML
HCT VFR BLD CALC: 43.7 %
IF BLOCK AB SER QL: NORMAL
METHYLMALONATE SERPL-SCNC: 120 NMOL/L
PCA AB SER QL IF: NORMAL

## 2020-07-17 RX ORDER — FLUTICASONE PROPIONATE AND SALMETEROL 250; 50 UG/1; UG/1
250-50 POWDER RESPIRATORY (INHALATION) TWICE DAILY
Qty: 1 | Refills: 3 | Status: DISCONTINUED | COMMUNITY
Start: 2020-06-17 | End: 2020-07-17

## 2020-07-20 RX ORDER — ALBUTEROL SULFATE 90 UG/1
108 (90 BASE) AEROSOL, METERED RESPIRATORY (INHALATION) EVERY 6 HOURS
Qty: 1 | Refills: 3 | Status: ACTIVE | COMMUNITY
Start: 2020-06-17 | End: 1900-01-01

## 2020-07-22 ENCOUNTER — APPOINTMENT (OUTPATIENT)
Dept: PULMONOLOGY | Facility: CLINIC | Age: 70
End: 2020-07-22
Payer: COMMERCIAL

## 2020-07-22 ENCOUNTER — MED ADMIN CHARGE (OUTPATIENT)
Age: 70
End: 2020-07-22

## 2020-07-22 VITALS
WEIGHT: 209 LBS | DIASTOLIC BLOOD PRESSURE: 92 MMHG | OXYGEN SATURATION: 96 % | HEART RATE: 77 BPM | HEIGHT: 72 IN | BODY MASS INDEX: 28.31 KG/M2 | RESPIRATION RATE: 14 BRPM | SYSTOLIC BLOOD PRESSURE: 166 MMHG

## 2020-07-22 DIAGNOSIS — J45.909 UNSPECIFIED ASTHMA, UNCOMPLICATED: ICD-10-CM

## 2020-07-22 DIAGNOSIS — Z78.9 OTHER SPECIFIED HEALTH STATUS: ICD-10-CM

## 2020-07-22 DIAGNOSIS — Z87.891 PERSONAL HISTORY OF NICOTINE DEPENDENCE: ICD-10-CM

## 2020-07-22 DIAGNOSIS — R05 COUGH: ICD-10-CM

## 2020-07-22 PROCEDURE — 96372 THER/PROPH/DIAG INJ SC/IM: CPT

## 2020-07-22 PROCEDURE — 99214 OFFICE O/P EST MOD 30 MIN: CPT

## 2020-07-22 RX ADMIN — CYANOCOBALAMIN 0 MCG/ML: 1000 INJECTION INTRAMUSCULAR; SUBCUTANEOUS at 00:00

## 2020-07-23 PROBLEM — Z78.9 SOCIAL ALCOHOL USE: Status: ACTIVE | Noted: 2020-07-23

## 2020-07-23 NOTE — HISTORY OF PRESENT ILLNESS
[TextBox_4] : Is a 69-year-old male with past medical history significant for asthma who presents today for follow-up.  Patient states that his cough and shortness of breath have resolved with the use of his current bronchodilators.  He denies any recent fevers chills chest pain weight loss or hemoptysis

## 2020-07-23 NOTE — ASSESSMENT
[FreeTextEntry1] : In summary the patient is a 69-year-old male with hypertension cholesterol was referred for consult. Patient's physical exam is within normal limits.  Patient is now instructed to continue his current medications and follow-up in 3 months

## 2020-07-23 NOTE — REASON FOR VISIT
[Follow-Up] : a follow-up visit [Asthma] : asthma [Cough] : cough [Wheezing] : wheezing [Shortness of Breath] : shortness of breath

## 2020-07-23 NOTE — PHYSICAL EXAM
[No Acute Distress] : no acute distress [Normal Oropharynx] : normal oropharynx [Normal S1, S2] : normal s1, s2 [Normal Appearance] : normal appearance [Normal Rate/Rhythm] : normal rate/rhythm [No Neck Mass] : no neck mass [No Resp Distress] : no resp distress [No Murmurs] : no murmurs [Clear to Auscultation Bilaterally] : clear to auscultation bilaterally [Benign] : benign [No Abnormalities] : no abnormalities [Normal Gait] : normal gait [No Clubbing] : no clubbing [No Cyanosis] : no cyanosis [FROM] : FROM [No Edema] : no edema [Normal Color/ Pigmentation] : normal color/ pigmentation [Normal Affect] : normal affect [No Focal Deficits] : no focal deficits [Oriented x3] : oriented x3

## 2020-07-24 RX ORDER — CYANOCOBALAMIN 1000 UG/ML
1000 INJECTION INTRAMUSCULAR; SUBCUTANEOUS
Qty: 0 | Refills: 0 | Status: COMPLETED | OUTPATIENT
Start: 2020-07-22

## 2020-08-20 ENCOUNTER — NON-APPOINTMENT (OUTPATIENT)
Age: 70
End: 2020-08-20

## 2020-08-20 ENCOUNTER — APPOINTMENT (OUTPATIENT)
Dept: INTERNAL MEDICINE | Facility: CLINIC | Age: 70
End: 2020-08-20
Payer: COMMERCIAL

## 2020-08-20 ENCOUNTER — APPOINTMENT (OUTPATIENT)
Dept: CARDIOLOGY | Facility: CLINIC | Age: 70
End: 2020-08-20
Payer: COMMERCIAL

## 2020-08-20 VITALS
RESPIRATION RATE: 14 BRPM | HEART RATE: 79 BPM | OXYGEN SATURATION: 96 % | TEMPERATURE: 97.2 F | DIASTOLIC BLOOD PRESSURE: 82 MMHG | SYSTOLIC BLOOD PRESSURE: 137 MMHG

## 2020-08-20 DIAGNOSIS — E53.8 DEFICIENCY OF OTHER SPECIFIED B GROUP VITAMINS: ICD-10-CM

## 2020-08-20 PROCEDURE — 93000 ELECTROCARDIOGRAM COMPLETE: CPT

## 2020-08-20 PROCEDURE — 99214 OFFICE O/P EST MOD 30 MIN: CPT | Mod: 25

## 2020-08-20 PROCEDURE — 96372 THER/PROPH/DIAG INJ SC/IM: CPT

## 2020-08-20 RX ORDER — CYANOCOBALAMIN 1000 UG/ML
1000 INJECTION INTRAMUSCULAR; SUBCUTANEOUS
Qty: 0 | Refills: 0 | Status: COMPLETED | OUTPATIENT
Start: 2020-08-20

## 2020-08-20 RX ADMIN — CYANOCOBALAMIN 0 MCG/ML: 1000 INJECTION, SOLUTION INTRAMUSCULAR at 00:00

## 2020-08-20 NOTE — PLAN
[FreeTextEntry1] : 1. Htn\par Amlodipine 5 mg QD\par Losartan 100 mg QD\par 2. Hld\par not compliant with meds\par fasting lipids today\par 3. GERD\par PPPI \par GI follow up\par 4. see plan

## 2020-08-20 NOTE — HISTORY OF PRESENT ILLNESS
[de-identified] : Patient is a 67 year old man with a history of long standing HTN, hyperlipidemia, elevated LFTs, former tobacco use, family history of CAD, low B12  who presents today for follow up on chronic problems and monthly B12 injection  He states that he has been feeling well denying any chest pain, dyspnea, palpitations, headaches, and dizziness. \par She was recently seen by ENT for sore throat > was diagnosed with GERD and started on Pepcid \par He uses albuterol MDI prn for SOB \par He is not compliant with Fenofibrate intake \par

## 2020-08-20 NOTE — PHYSICAL EXAM
[No Acute Distress] : no acute distress [Well Nourished] : well nourished [Well Developed] : well developed [Well-Appearing] : well-appearing [Normal Sclera/Conjunctiva] : normal sclera/conjunctiva [EOMI] : extraocular movements intact [Normal Outer Ear/Nose] : the outer ears and nose were normal in appearance [Normal Oropharynx] : the oropharynx was normal [Normal TMs] : both tympanic membranes were normal [No JVD] : no jugular venous distention [No Lymphadenopathy] : no lymphadenopathy [Supple] : supple [Thyroid Normal, No Nodules] : the thyroid was normal and there were no nodules present [No Respiratory Distress] : no respiratory distress  [No Accessory Muscle Use] : no accessory muscle use [Clear to Auscultation] : lungs were clear to auscultation bilaterally [Normal Rate] : normal rate  [Regular Rhythm] : with a regular rhythm [Normal S1, S2] : normal S1 and S2 [No Murmur] : no murmur heard [No Carotid Bruits] : no carotid bruits [Soft] : abdomen soft [No Edema] : there was no peripheral edema [Pedal Pulses Present] : the pedal pulses are present [Non Tender] : non-tender [Non-distended] : non-distended [Normal Bowel Sounds] : normal bowel sounds [No Masses] : no abdominal mass palpated [Normal Posterior Cervical Nodes] : no posterior cervical lymphadenopathy [Normal Anterior Cervical Nodes] : no anterior cervical lymphadenopathy [No CVA Tenderness] : no CVA  tenderness [No Spinal Tenderness] : no spinal tenderness [Grossly Normal Strength/Tone] : grossly normal strength/tone [No Joint Swelling] : no joint swelling [No Rash] : no rash [Coordination Grossly Intact] : coordination grossly intact [No Focal Deficits] : no focal deficits [Normal Gait] : normal gait [Normal Affect] : the affect was normal [Normal Insight/Judgement] : insight and judgment were intact

## 2020-08-21 LAB
ALBUMIN SERPL ELPH-MCNC: 4.4 G/DL
ALP BLD-CCNC: 67 U/L
ALT SERPL-CCNC: 24 U/L
ANION GAP SERPL CALC-SCNC: 13 MMOL/L
AST SERPL-CCNC: 23 U/L
BILIRUB SERPL-MCNC: 0.4 MG/DL
BUN SERPL-MCNC: 11 MG/DL
CALCIUM SERPL-MCNC: 9.8 MG/DL
CHLORIDE SERPL-SCNC: 102 MMOL/L
CHOLEST SERPL-MCNC: 288 MG/DL
CHOLEST/HDLC SERPL: 3.8 RATIO
CO2 SERPL-SCNC: 26 MMOL/L
CREAT SERPL-MCNC: 0.99 MG/DL
GLUCOSE SERPL-MCNC: 105 MG/DL
HDLC SERPL-MCNC: 75 MG/DL
LDLC SERPL CALC-MCNC: 176 MG/DL
POTASSIUM SERPL-SCNC: 4.6 MMOL/L
PROT SERPL-MCNC: 7.1 G/DL
SODIUM SERPL-SCNC: 140 MMOL/L
TRIGL SERPL-MCNC: 188 MG/DL

## 2020-08-24 NOTE — HISTORY OF PRESENT ILLNESS
[FreeTextEntry1] : Patient is a 69 year old man with a history of long standing HTN, hyperlipidemia, elevated LFTs, former tobacco use, family history of CAD, alcohol use, and aortic root dilatation who presents today for evaluation of HTN and hyperlipidemia. He denies any exertional chest pain, dyspnea, palpitations, headaches, and dizziness. He was recently diagnosed with GERD by ENT and has been taking Famotidine with relief. He does report midsternal chest tightness, which occurs at rest, which he has previously attributed to acid reflux and asthma. He also states that he stopped taking his Fenofibrate as he wanted to modify his diet.

## 2020-08-24 NOTE — PHYSICAL EXAM
[General Appearance - Well Developed] : well developed [Normal Appearance] : normal appearance [No Deformities] : no deformities [Well Groomed] : well groomed [General Appearance - Well Nourished] : well nourished [Normal Conjunctiva] : the conjunctiva exhibited no abnormalities [General Appearance - In No Acute Distress] : no acute distress [Normal Oral Mucosa] : normal oral mucosa [No Oral Pallor] : no oral pallor [No Oral Cyanosis] : no oral cyanosis [Normal Jugular Venous V Waves Present] : normal jugular venous V waves present [Normal Jugular Venous A Waves Present] : normal jugular venous A waves present [Exaggerated Use Of Accessory Muscles For Inspiration] : no accessory muscle use [Auscultation Breath Sounds / Voice Sounds] : lungs were clear to auscultation bilaterally [Respiration, Rhythm And Depth] : normal respiratory rhythm and effort [Rhythm Regular] : regular [Normal S1] : normal S1 [Normal S2] : normal S2 [No Murmur] : no murmurs heard [No Pitting Edema] : no pitting edema present [Abdomen Tenderness] : non-tender [Abdomen Soft] : soft [Gait - Sufficient For Exercise Testing] : the gait was sufficient for exercise testing [Abnormal Walk] : normal gait [Cyanosis, Localized] : no localized cyanosis [Petechial Hemorrhages (___cm)] : no petechial hemorrhages [Nail Clubbing] : no clubbing of the fingernails [] : no rash [Skin Color & Pigmentation] : normal skin color and pigmentation [Oriented To Time, Place, And Person] : oriented to person, place, and time [Affect] : the affect was normal [Mood] : the mood was normal [No Anxiety] : not feeling anxious [Normal Rate] : normal [Bowel Sounds] : normal bowel sounds [No Skin Ulcers] : no skin ulcer [FreeTextEntry1] : Extraocular muscles intact. Anicteric sclerae.  [S3] : no S3 [Right Carotid Bruit] : no bruit heard over the right carotid [Left Carotid Bruit] : no bruit heard over the left carotid [Bruit] : no bruit heard

## 2020-08-24 NOTE — DISCUSSION/SUMMARY
[FreeTextEntry1] : IMPRESSION: Mr. HENRY is a 69 year-old man with a history of long standing HTN, hyperlipidemia, elevated LFTs, former tobacco use, family history of CAD, alcohol use, and aortic root dilatation who presents today for evaluation of HTN and hyperlipidemia. \par \par PLAN:\par 1. His blood pressure is acceptable at this time, thus he will continue on Amlodipine 5mg daily and Losartan 100mg daily along with a low sodium diet. I will be checking a BMP to evaluate his potassium and creatinine on the higher dose of Losartan.\par 2. I have asked him to schedule an ultrasound of the abdomen to r/o a AAA give his risk factors for a AAA as well as his aortic root dilatation.\par 3. He will have repeat lipid panel and CMP checked today. He will continue on a low cholesterol/ low fat diet.  If his cholesterol is abnormal, then he may need to restart on Fenofibrate.\par 4. He will schedule an echocardiogram to evaluate his aortic root dilatation, as well as his LV function given his history of hypertension.\par 5. His chest pain is atypical. He may benefit from a cardiac CT given his aortic dilatation and for further evaluation of his chest pain. \par 6. He will follow up with me after his echocardiogram has been performed.

## 2020-09-21 ENCOUNTER — APPOINTMENT (OUTPATIENT)
Dept: CARDIOLOGY | Facility: CLINIC | Age: 70
End: 2020-09-21

## 2020-09-21 ENCOUNTER — APPOINTMENT (OUTPATIENT)
Dept: INTERNAL MEDICINE | Facility: CLINIC | Age: 70
End: 2020-09-21

## 2020-09-29 ENCOUNTER — APPOINTMENT (OUTPATIENT)
Dept: CARDIOLOGY | Facility: CLINIC | Age: 70
End: 2020-09-29
Payer: COMMERCIAL

## 2020-09-29 PROCEDURE — 93306 TTE W/DOPPLER COMPLETE: CPT

## 2020-10-12 ENCOUNTER — RX RENEWAL (OUTPATIENT)
Age: 70
End: 2020-10-12

## 2020-10-22 ENCOUNTER — APPOINTMENT (OUTPATIENT)
Dept: PULMONOLOGY | Facility: CLINIC | Age: 70
End: 2020-10-22

## 2020-10-22 ENCOUNTER — APPOINTMENT (OUTPATIENT)
Dept: CARDIOLOGY | Facility: CLINIC | Age: 70
End: 2020-10-22
Payer: COMMERCIAL

## 2020-10-22 ENCOUNTER — APPOINTMENT (OUTPATIENT)
Dept: INTERNAL MEDICINE | Facility: CLINIC | Age: 70
End: 2020-10-22
Payer: COMMERCIAL

## 2020-10-22 VITALS
HEIGHT: 72 IN | SYSTOLIC BLOOD PRESSURE: 148 MMHG | DIASTOLIC BLOOD PRESSURE: 80 MMHG | RESPIRATION RATE: 14 BRPM | BODY MASS INDEX: 29.8 KG/M2 | WEIGHT: 220 LBS | TEMPERATURE: 97.1 F | HEART RATE: 88 BPM | OXYGEN SATURATION: 96 %

## 2020-10-22 DIAGNOSIS — J45.909 UNSPECIFIED ASTHMA, UNCOMPLICATED: ICD-10-CM

## 2020-10-22 PROCEDURE — 99072 ADDL SUPL MATRL&STAF TM PHE: CPT

## 2020-10-22 PROCEDURE — 96372 THER/PROPH/DIAG INJ SC/IM: CPT

## 2020-10-22 PROCEDURE — 93978 VASCULAR STUDY: CPT

## 2020-10-22 PROCEDURE — 99214 OFFICE O/P EST MOD 30 MIN: CPT | Mod: 25

## 2020-10-22 RX ORDER — CYANOCOBALAMIN 1000 UG/ML
1000 INJECTION INTRAMUSCULAR; SUBCUTANEOUS
Qty: 0 | Refills: 0 | Status: COMPLETED | OUTPATIENT
Start: 2020-10-22

## 2020-10-22 RX ADMIN — CYANOCOBALAMIN 0 MCG/ML: 1000 INJECTION, SOLUTION INTRAMUSCULAR at 00:00

## 2020-10-26 LAB
ALBUMIN SERPL ELPH-MCNC: 4.4 G/DL
ALP BLD-CCNC: 71 U/L
ALT SERPL-CCNC: 21 U/L
ANION GAP SERPL CALC-SCNC: 13 MMOL/L
AST SERPL-CCNC: 20 U/L
BILIRUB SERPL-MCNC: 0.5 MG/DL
BUN SERPL-MCNC: 17 MG/DL
CALCIUM SERPL-MCNC: 10.2 MG/DL
CHLORIDE SERPL-SCNC: 104 MMOL/L
CO2 SERPL-SCNC: 24 MMOL/L
CREAT SERPL-MCNC: 1.07 MG/DL
GLUCOSE SERPL-MCNC: 91 MG/DL
POTASSIUM SERPL-SCNC: 4.3 MMOL/L
PROT SERPL-MCNC: 7.3 G/DL
SODIUM SERPL-SCNC: 141 MMOL/L

## 2020-10-27 PROBLEM — J45.909 ASTHMA: Status: ACTIVE | Noted: 2020-10-27

## 2020-10-27 LAB
CHOLEST SERPL-MCNC: 311 MG/DL
HDLC SERPL-MCNC: 73 MG/DL
LDLC SERPL CALC-MCNC: 205 MG/DL
NONHDLC SERPL-MCNC: 238 MG/DL
TRIGL SERPL-MCNC: 164 MG/DL

## 2020-10-27 RX ORDER — UBIDECARENONE 100 MG
100 CAPSULE ORAL
Qty: 1 | Refills: 1 | Status: ACTIVE | COMMUNITY
Start: 2020-10-27 | End: 1900-01-01

## 2020-10-27 NOTE — PHYSICAL EXAM
[No Acute Distress] : no acute distress [Well Nourished] : well nourished [Well Developed] : well developed [Well-Appearing] : well-appearing [Normal Sclera/Conjunctiva] : normal sclera/conjunctiva [EOMI] : extraocular movements intact [Normal Outer Ear/Nose] : the outer ears and nose were normal in appearance [Normal Oropharynx] : the oropharynx was normal [Normal TMs] : both tympanic membranes were normal [No JVD] : no jugular venous distention [No Lymphadenopathy] : no lymphadenopathy [Supple] : supple [Thyroid Normal, No Nodules] : the thyroid was normal and there were no nodules present [No Respiratory Distress] : no respiratory distress  [No Accessory Muscle Use] : no accessory muscle use [Clear to Auscultation] : lungs were clear to auscultation bilaterally [Normal Rate] : normal rate  [Regular Rhythm] : with a regular rhythm [Normal S1, S2] : normal S1 and S2 [No Murmur] : no murmur heard [No Carotid Bruits] : no carotid bruits [Pedal Pulses Present] : the pedal pulses are present [No Edema] : there was no peripheral edema [Soft] : abdomen soft [Non Tender] : non-tender [Non-distended] : non-distended [No Masses] : no abdominal mass palpated [Normal Bowel Sounds] : normal bowel sounds [Normal Posterior Cervical Nodes] : no posterior cervical lymphadenopathy [Normal Anterior Cervical Nodes] : no anterior cervical lymphadenopathy [No CVA Tenderness] : no CVA  tenderness [No Spinal Tenderness] : no spinal tenderness [No Joint Swelling] : no joint swelling [Grossly Normal Strength/Tone] : grossly normal strength/tone [No Rash] : no rash [Coordination Grossly Intact] : coordination grossly intact [No Focal Deficits] : no focal deficits [Normal Gait] : normal gait [Normal Affect] : the affect was normal [Normal Insight/Judgement] : insight and judgment were intact

## 2020-10-27 NOTE — PLAN
[FreeTextEntry1] : 1. Htn\par Amlodipine 5 mg QD\par Losartan 100 mg QD\par 2. Hld\par Fenofibrate 54 mg QD\par fasting lipids today\par 3. GERD\par PPPI \par 4. Asthma\par Ventolin prn\par 5/ Low B12\par monthly B12 injection

## 2020-10-27 NOTE — HISTORY OF PRESENT ILLNESS
[de-identified] : Patient is a 70 year old man with a history of long standing HTN, hyperlipidemia, GERD, Asthma ,former tobacco use, low B12  who presents today for follow up on chronic problems and monthly B12 injection  He states that he has been feeling well denying any chest pain, dyspnea, palpitations, headaches, and dizziness. \par He uses albuterol MDI prn for asthma \par \par

## 2020-12-15 PROBLEM — Z87.09 HISTORY OF ACUTE SINUSITIS: Status: RESOLVED | Noted: 2017-12-22 | Resolved: 2020-12-15

## 2020-12-16 ENCOUNTER — RX RENEWAL (OUTPATIENT)
Age: 70
End: 2020-12-16

## 2020-12-18 ENCOUNTER — APPOINTMENT (OUTPATIENT)
Dept: INTERNAL MEDICINE | Facility: CLINIC | Age: 70
End: 2020-12-18
Payer: COMMERCIAL

## 2020-12-18 VITALS
TEMPERATURE: 97.8 F | OXYGEN SATURATION: 96 % | DIASTOLIC BLOOD PRESSURE: 85 MMHG | WEIGHT: 218 LBS | HEART RATE: 98 BPM | RESPIRATION RATE: 12 BRPM | HEIGHT: 72 IN | SYSTOLIC BLOOD PRESSURE: 148 MMHG | BODY MASS INDEX: 29.53 KG/M2

## 2020-12-18 DIAGNOSIS — I10 ESSENTIAL (PRIMARY) HYPERTENSION: ICD-10-CM

## 2020-12-18 PROCEDURE — 99214 OFFICE O/P EST MOD 30 MIN: CPT

## 2020-12-18 PROCEDURE — 99072 ADDL SUPL MATRL&STAF TM PHE: CPT

## 2020-12-18 RX ORDER — FENOFIBRATE 54 MG/1
54 TABLET ORAL DAILY
Qty: 30 | Refills: 1 | Status: DISCONTINUED | COMMUNITY
Start: 2019-10-14 | End: 2020-12-18

## 2020-12-21 PROBLEM — J06.9 ACUTE URI: Status: RESOLVED | Noted: 2019-01-25 | Resolved: 2020-12-21

## 2020-12-21 PROBLEM — I10 HYPERTENSION, UNCONTROLLED: Status: ACTIVE | Noted: 2017-06-23

## 2020-12-21 NOTE — PLAN
[FreeTextEntry1] : 1. Htn\par Amlodipine 5 mg QD\par Losartan 100 mg QD\par add Atenolol 25 mg QD\par 2. Hld\par Fenofibrate 54 mg QD\par fasting lipids today\par 3. GERD\par PPPI \par 4. Asthma\par Ventolin prn\par 5.  Low B12\par monthly B12 injection

## 2020-12-21 NOTE — HISTORY OF PRESENT ILLNESS
[de-identified] : Patient is a 70 year old man with a history of long standing HTN, hyperlipidemia, GERD, Asthma ,former tobacco use, low B12  who presents today for follow up on chronic problems  He states that he has been feeling well denying any chest pain, dyspnea, palpitations, headaches, and dizziness. \par \par \par

## 2020-12-22 LAB
ALBUMIN SERPL ELPH-MCNC: 4.7 G/DL
ALP BLD-CCNC: 75 U/L
ALT SERPL-CCNC: 34 U/L
ANION GAP SERPL CALC-SCNC: 18 MMOL/L
AST SERPL-CCNC: 31 U/L
BILIRUB SERPL-MCNC: 0.5 MG/DL
BUN SERPL-MCNC: 13 MG/DL
CALCIUM SERPL-MCNC: 10.4 MG/DL
CHLORIDE SERPL-SCNC: 100 MMOL/L
CHOLEST SERPL-MCNC: 222 MG/DL
CO2 SERPL-SCNC: 23 MMOL/L
CREAT SERPL-MCNC: 1.03 MG/DL
GLUCOSE SERPL-MCNC: 115 MG/DL
HDLC SERPL-MCNC: 71 MG/DL
LDLC SERPL CALC-MCNC: 94 MG/DL
NONHDLC SERPL-MCNC: 151 MG/DL
POTASSIUM SERPL-SCNC: 4 MMOL/L
PROT SERPL-MCNC: 7.6 G/DL
SODIUM SERPL-SCNC: 141 MMOL/L
TRIGL SERPL-MCNC: 284 MG/DL
VIT B12 SERPL-MCNC: 489 PG/ML

## 2020-12-23 PROBLEM — Z87.09 HISTORY OF SORE THROAT: Status: RESOLVED | Noted: 2020-06-12 | Resolved: 2020-12-23

## 2021-02-22 ENCOUNTER — RX RENEWAL (OUTPATIENT)
Age: 71
End: 2021-02-22

## 2021-04-14 ENCOUNTER — APPOINTMENT (OUTPATIENT)
Dept: INTERNAL MEDICINE | Facility: CLINIC | Age: 71
End: 2021-04-14
Payer: MEDICARE

## 2021-04-14 VITALS
HEIGHT: 72 IN | WEIGHT: 210 LBS | RESPIRATION RATE: 12 BRPM | HEART RATE: 79 BPM | DIASTOLIC BLOOD PRESSURE: 84 MMHG | BODY MASS INDEX: 28.44 KG/M2 | SYSTOLIC BLOOD PRESSURE: 151 MMHG | TEMPERATURE: 97.1 F | OXYGEN SATURATION: 96 %

## 2021-04-14 DIAGNOSIS — Z00.00 ENCOUNTER FOR GENERAL ADULT MEDICAL EXAMINATION W/OUT ABNORMAL FINDINGS: ICD-10-CM

## 2021-04-14 DIAGNOSIS — T16.9XXA FOREIGN BODY IN EAR, UNSPECIFIED EAR, INITIAL ENCOUNTER: ICD-10-CM

## 2021-04-14 DIAGNOSIS — R35.1 NOCTURIA: ICD-10-CM

## 2021-04-14 PROCEDURE — 99072 ADDL SUPL MATRL&STAF TM PHE: CPT

## 2021-04-14 PROCEDURE — 99397 PER PM REEVAL EST PAT 65+ YR: CPT

## 2021-04-14 PROCEDURE — 99213 OFFICE O/P EST LOW 20 MIN: CPT

## 2021-04-18 NOTE — PHYSICAL EXAM
[No Acute Distress] : no acute distress [Well Nourished] : well nourished [Well Developed] : well developed [Well-Appearing] : well-appearing [Normal Sclera/Conjunctiva] : normal sclera/conjunctiva [PERRL] : pupils equal round and reactive to light [EOMI] : extraocular movements intact [Normal Outer Ear/Nose] : the outer ears and nose were normal in appearance [Normal Oropharynx] : the oropharynx was normal [No JVD] : no jugular venous distention [No Lymphadenopathy] : no lymphadenopathy [Supple] : supple [Thyroid Normal, No Nodules] : the thyroid was normal and there were no nodules present [No Respiratory Distress] : no respiratory distress  [No Accessory Muscle Use] : no accessory muscle use [Clear to Auscultation] : lungs were clear to auscultation bilaterally [Normal Rate] : normal rate  [Regular Rhythm] : with a regular rhythm [Normal S1, S2] : normal S1 and S2 [No Murmur] : no murmur heard [Pedal Pulses Present] : the pedal pulses are present [No Edema] : there was no peripheral edema [No Extremity Clubbing/Cyanosis] : no extremity clubbing/cyanosis [Soft] : abdomen soft [Non Tender] : non-tender [Non-distended] : non-distended [No Masses] : no abdominal mass palpated [No HSM] : no HSM [Normal Bowel Sounds] : normal bowel sounds [Normal Posterior Cervical Nodes] : no posterior cervical lymphadenopathy [Normal Anterior Cervical Nodes] : no anterior cervical lymphadenopathy [No CVA Tenderness] : no CVA  tenderness [No Spinal Tenderness] : no spinal tenderness [No Joint Swelling] : no joint swelling [Grossly Normal Strength/Tone] : grossly normal strength/tone [No Rash] : no rash [Coordination Grossly Intact] : coordination grossly intact [No Focal Deficits] : no focal deficits [Normal Gait] : normal gait [Speech Grossly Normal] : speech grossly normal [Normal Affect] : the affect was normal [Alert and Oriented x3] : oriented to person, place, and time [Normal Insight/Judgement] : insight and judgment were intact [de-identified] : + foreign body in left ear with wax over it

## 2021-04-18 NOTE — ASSESSMENT
[FreeTextEntry1] : Physical\par he is UTD with his colonoscopy\par declines flu/pneumonia/shingles vaccine\par blood work ordered\par \par left ear canal foreign body\par reffered to ENT-pt says he has made antonio't for the 26th of this month\par \par Nocturia\par we'll check PSA today\par referred to urology\par \par HTN, HLD, GERD\par cont current meds\par \par follow up in one week for lab results\par

## 2021-04-18 NOTE — HEALTH RISK ASSESSMENT
[Yes] : Yes [Alone] : lives alone [] :  [# Of Children ___] : has [unfilled] children [Fully functional (bathing, dressing, toileting, transferring, walking, feeding)] : Fully functional (bathing, dressing, toileting, transferring, walking, feeding) [Fully functional (using the telephone, shopping, preparing meals, housekeeping, doing laundry, using] : Fully functional and needs no help or supervision to perform IADLs (using the telephone, shopping, preparing meals, housekeeping, doing laundry, using transportation, managing medications and managing finances) [] : No [de-identified] : drinks wine daily [ColonoscopyDate] : 2016

## 2021-04-18 NOTE — HISTORY OF PRESENT ILLNESS
[de-identified] : Mr. ELYSE HENRY is a 70 year old male, with hx of HTN, HLD, GERD, asthma, presents for annual physical\par He complaints of having discomfort in his left ear\par Also says that for the past month or so he wakes up at night to urinate. he had similar symptoms in the past but they went away\par Denies SOB, chest pain, abdominal pain, N/V/D, leg swelling, headache, dizziness \par Offers no complaints\par

## 2021-04-25 LAB
APPEARANCE: CLEAR
BASOPHILS # BLD AUTO: 0.04 K/UL
BASOPHILS NFR BLD AUTO: 1 %
BILIRUBIN URINE: NEGATIVE
BLOOD URINE: NEGATIVE
COLOR: YELLOW
EOSINOPHIL # BLD AUTO: 0.1 K/UL
EOSINOPHIL NFR BLD AUTO: 2.4 %
GLUCOSE QUALITATIVE U: NEGATIVE
HCT VFR BLD CALC: 43.4 %
HGB BLD-MCNC: 14.1 G/DL
IMM GRANULOCYTES NFR BLD AUTO: 0.2 %
KETONES URINE: NEGATIVE
LEUKOCYTE ESTERASE URINE: NEGATIVE
LYMPHOCYTES # BLD AUTO: 1.98 K/UL
LYMPHOCYTES NFR BLD AUTO: 47.7 %
MAN DIFF?: NORMAL
MCHC RBC-ENTMCNC: 29.1 PG
MCHC RBC-ENTMCNC: 32.5 GM/DL
MCV RBC AUTO: 89.5 FL
MONOCYTES # BLD AUTO: 0.49 K/UL
MONOCYTES NFR BLD AUTO: 11.8 %
NEUTROPHILS # BLD AUTO: 1.53 K/UL
NEUTROPHILS NFR BLD AUTO: 36.9 %
NITRITE URINE: NEGATIVE
PH URINE: 6
PLATELET # BLD AUTO: 192 K/UL
PROTEIN URINE: NEGATIVE
PSA SERPL-MCNC: 0.95 NG/ML
RBC # BLD: 4.85 M/UL
RBC # FLD: 15 %
SPECIFIC GRAVITY URINE: 1.02
TSH SERPL-ACNC: 3.31 UIU/ML
UROBILINOGEN URINE: NORMAL
VIT B12 SERPL-MCNC: 404 PG/ML
WBC # FLD AUTO: 4.15 K/UL

## 2021-04-29 LAB
25(OH)D3 SERPL-MCNC: 17 NG/ML
ALBUMIN SERPL ELPH-MCNC: 4.1 G/DL
ALP BLD-CCNC: 86 U/L
ALT SERPL-CCNC: 60 U/L
ANION GAP SERPL CALC-SCNC: 16 MMOL/L
AST SERPL-CCNC: 48 U/L
BILIRUB SERPL-MCNC: 0.5 MG/DL
BUN SERPL-MCNC: 15 MG/DL
CALCIUM SERPL-MCNC: 9.3 MG/DL
CHLORIDE SERPL-SCNC: 99 MMOL/L
CHOLEST SERPL-MCNC: 308 MG/DL
CO2 SERPL-SCNC: 22 MMOL/L
COVID-19 NUCLEOCAPSID  GAM ANTIBODY INTERPRETATION: POSITIVE
COVID-19 SPIKE DOMAIN ANTIBODY INTERPRETATION: POSITIVE
CREAT SERPL-MCNC: 1.03 MG/DL
GLUCOSE SERPL-MCNC: 99 MG/DL
HDLC SERPL-MCNC: 47 MG/DL
LDLC SERPL CALC-MCNC: NORMAL MG/DL
NONHDLC SERPL-MCNC: 261 MG/DL
POTASSIUM SERPL-SCNC: 4.4 MMOL/L
PROT SERPL-MCNC: 7 G/DL
SARS-COV-2 AB SERPL IA-ACNC: >250 U/ML
SARS-COV-2 AB SERPL QL IA: 49.1 INDEX
SODIUM SERPL-SCNC: 138 MMOL/L
TRIGL SERPL-MCNC: 933 MG/DL

## 2021-05-03 ENCOUNTER — APPOINTMENT (OUTPATIENT)
Dept: UROLOGY | Facility: CLINIC | Age: 71
End: 2021-05-03

## 2021-05-14 LAB
ESTIMATED AVERAGE GLUCOSE: 126 MG/DL
HBA1C MFR BLD HPLC: 6 %

## 2021-08-02 ENCOUNTER — RX RENEWAL (OUTPATIENT)
Age: 71
End: 2021-08-02

## 2021-09-14 ENCOUNTER — APPOINTMENT (OUTPATIENT)
Dept: INTERNAL MEDICINE | Facility: CLINIC | Age: 71
End: 2021-09-14
Payer: MEDICARE

## 2021-09-14 ENCOUNTER — NON-APPOINTMENT (OUTPATIENT)
Age: 71
End: 2021-09-14

## 2021-09-14 VITALS
OXYGEN SATURATION: 93 % | HEIGHT: 72 IN | WEIGHT: 208 LBS | RESPIRATION RATE: 12 BRPM | DIASTOLIC BLOOD PRESSURE: 99 MMHG | HEART RATE: 93 BPM | BODY MASS INDEX: 28.17 KG/M2 | SYSTOLIC BLOOD PRESSURE: 140 MMHG | TEMPERATURE: 96.9 F

## 2021-09-14 VITALS — DIASTOLIC BLOOD PRESSURE: 82 MMHG | SYSTOLIC BLOOD PRESSURE: 134 MMHG

## 2021-09-14 DIAGNOSIS — R00.2 PALPITATIONS: ICD-10-CM

## 2021-09-14 DIAGNOSIS — R42 DIZZINESS AND GIDDINESS: ICD-10-CM

## 2021-09-14 PROCEDURE — 99214 OFFICE O/P EST MOD 30 MIN: CPT

## 2021-09-21 PROBLEM — R00.2 PALPITATIONS: Status: ACTIVE | Noted: 2021-09-21

## 2021-09-21 NOTE — PHYSICAL EXAM
[Well Nourished] : well nourished [No Acute Distress] : no acute distress [Well Developed] : well developed [Well-Appearing] : well-appearing [Normal Sclera/Conjunctiva] : normal sclera/conjunctiva [EOMI] : extraocular movements intact [Normal Outer Ear/Nose] : the outer ears and nose were normal in appearance [Normal Oropharynx] : the oropharynx was normal [Normal TMs] : both tympanic membranes were normal [No JVD] : no jugular venous distention [No Lymphadenopathy] : no lymphadenopathy [Supple] : supple [No Accessory Muscle Use] : no accessory muscle use [No Respiratory Distress] : no respiratory distress  [Clear to Auscultation] : lungs were clear to auscultation bilaterally [Normal Rate] : normal rate  [Regular Rhythm] : with a regular rhythm [No Murmur] : no murmur heard [Normal S1, S2] : normal S1 and S2 [No Carotid Bruits] : no carotid bruits [Pedal Pulses Present] : the pedal pulses are present [No Edema] : there was no peripheral edema [Soft] : abdomen soft [Non Tender] : non-tender [Non-distended] : non-distended [No Masses] : no abdominal mass palpated [No HSM] : no HSM [Normal Bowel Sounds] : normal bowel sounds [Normal Posterior Cervical Nodes] : no posterior cervical lymphadenopathy [Normal Anterior Cervical Nodes] : no anterior cervical lymphadenopathy [No CVA Tenderness] : no CVA  tenderness [No Spinal Tenderness] : no spinal tenderness [No Joint Swelling] : no joint swelling [Grossly Normal Strength/Tone] : grossly normal strength/tone [No Rash] : no rash [Coordination Grossly Intact] : coordination grossly intact [No Focal Deficits] : no focal deficits [Normal Gait] : normal gait [Normal Affect] : the affect was normal [Normal Insight/Judgement] : insight and judgment were intact

## 2021-09-21 NOTE — HISTORY OF PRESENT ILLNESS
[de-identified] : Mr. ELYSE HENRY is a 70 year old male, with hx of HTN, HLD, GERD, asthma, presents for follow up on chronic problems \par \par He reports on Sunday night while lying down he start feeling palpitation and felt like  heart was racing which kept him up all night. Monday morning he stay when he got up for work he was feeling dizzy and had a near syncopal episode on the train while going to work. He reports over the weekend he had company and was drinking more than normal and eating fatty foods not sure if this has attributed to his symptoms. Denies HA, numbness/tingling weakness in extremities, visual changes, CP, SOB \par \par He has increased fatigue, Denies Fevers, chills, CP, SOB, N/V or abdominal pain

## 2021-09-21 NOTE — PLAN
[FreeTextEntry1] : Follow-up\par \par 1. Dizziness/ Palpitation \par pt asymptomatic at present \par Increased po fluid intake \par EKG : NSR @ 68 / old ant infarct \par cardio follow up\par \par 2. Elevated LFT\par US abdomen\par will check hepatitis panel today\par \par 3. HTN\par Low sodium diet\par cont Amlodipine 5 mg daily\par cont Atenolol 25 mg daily\par cont Losartan 100 mg daily\par \par 4. HLD\par low fat diet\par He reports he is not compliant with Crestor\par will check lipids today\par Crestor 5 mg daily\par \par Labs ordered pt to f/u for results\par \par

## 2021-09-21 NOTE — REVIEW OF SYSTEMS
[Chest Pain] : no chest pain [Palpitations] : palpitations [Headache] : no headache [Dizziness] : dizziness [Negative] : Heme/Lymph

## 2021-09-22 LAB
ALBUMIN SERPL ELPH-MCNC: 4.3 G/DL
ALP BLD-CCNC: 74 U/L
ALT SERPL-CCNC: 27 U/L
ANION GAP SERPL CALC-SCNC: 12 MMOL/L
AST SERPL-CCNC: 26 U/L
BASOPHILS # BLD AUTO: 0.02 K/UL
BASOPHILS NFR BLD AUTO: 0.4 %
BILIRUB SERPL-MCNC: 0.5 MG/DL
BUN SERPL-MCNC: 17 MG/DL
CALCIUM SERPL-MCNC: 10.2 MG/DL
CHLORIDE SERPL-SCNC: 101 MMOL/L
CHOLEST SERPL-MCNC: 309 MG/DL
CO2 SERPL-SCNC: 23 MMOL/L
CREAT SERPL-MCNC: 0.97 MG/DL
EOSINOPHIL # BLD AUTO: 0.14 K/UL
EOSINOPHIL NFR BLD AUTO: 2.9 %
ESTIMATED AVERAGE GLUCOSE: 120 MG/DL
GLUCOSE SERPL-MCNC: 93 MG/DL
HAV IGM SER QL: NONREACTIVE
HBA1C MFR BLD HPLC: 5.8 %
HBV CORE IGM SER QL: NONREACTIVE
HBV SURFACE AG SER QL: NONREACTIVE
HCT VFR BLD CALC: 42.6 %
HCV AB SER QL: NONREACTIVE
HCV S/CO RATIO: 0.11 S/CO
HDLC SERPL-MCNC: 68 MG/DL
HGB BLD-MCNC: 13.1 G/DL
IMM GRANULOCYTES NFR BLD AUTO: 0.2 %
LDLC SERPL CALC-MCNC: 204 MG/DL
LYMPHOCYTES # BLD AUTO: 1.88 K/UL
LYMPHOCYTES NFR BLD AUTO: 39.2 %
MAN DIFF?: NORMAL
MCHC RBC-ENTMCNC: 29 PG
MCHC RBC-ENTMCNC: 30.8 GM/DL
MCV RBC AUTO: 94.5 FL
MONOCYTES # BLD AUTO: 0.62 K/UL
MONOCYTES NFR BLD AUTO: 12.9 %
NEUTROPHILS # BLD AUTO: 2.13 K/UL
NEUTROPHILS NFR BLD AUTO: 44.4 %
NONHDLC SERPL-MCNC: 240 MG/DL
PLATELET # BLD AUTO: 190 K/UL
POTASSIUM SERPL-SCNC: 4.7 MMOL/L
PROT SERPL-MCNC: 7.1 G/DL
RBC # BLD: 4.51 M/UL
RBC # FLD: 14.9 %
SODIUM SERPL-SCNC: 136 MMOL/L
TRIGL SERPL-MCNC: 182 MG/DL
WBC # FLD AUTO: 4.8 K/UL

## 2021-11-01 ENCOUNTER — APPOINTMENT (OUTPATIENT)
Dept: CARDIOLOGY | Facility: CLINIC | Age: 71
End: 2021-11-01

## 2022-03-08 RX ORDER — FAMOTIDINE 40 MG/1
40 TABLET, FILM COATED ORAL
Qty: 90 | Refills: 0 | Status: ACTIVE | COMMUNITY
Start: 2020-06-17 | End: 1900-01-01

## 2022-03-09 ENCOUNTER — APPOINTMENT (OUTPATIENT)
Dept: INTERNAL MEDICINE | Facility: CLINIC | Age: 72
End: 2022-03-09
Payer: MEDICARE

## 2022-03-09 VITALS
OXYGEN SATURATION: 96 % | SYSTOLIC BLOOD PRESSURE: 161 MMHG | HEART RATE: 98 BPM | BODY MASS INDEX: 28.44 KG/M2 | HEIGHT: 72 IN | DIASTOLIC BLOOD PRESSURE: 84 MMHG | WEIGHT: 210 LBS | TEMPERATURE: 96.9 F | RESPIRATION RATE: 12 BRPM

## 2022-03-09 DIAGNOSIS — M54.50 LOW BACK PAIN, UNSPECIFIED: ICD-10-CM

## 2022-03-09 PROCEDURE — 99214 OFFICE O/P EST MOD 30 MIN: CPT

## 2022-03-09 RX ORDER — CYCLOBENZAPRINE HYDROCHLORIDE 5 MG/1
5 TABLET, FILM COATED ORAL
Qty: 20 | Refills: 0 | Status: ACTIVE | COMMUNITY
Start: 2022-03-09 | End: 1900-01-01

## 2022-03-14 LAB
ALBUMIN SERPL ELPH-MCNC: 4.1 G/DL
ALP BLD-CCNC: 81 U/L
ALT SERPL-CCNC: 75 U/L
ANION GAP SERPL CALC-SCNC: 15 MMOL/L
APPEARANCE: CLEAR
AST SERPL-CCNC: 95 U/L
BASOPHILS # BLD AUTO: 0.04 K/UL
BASOPHILS NFR BLD AUTO: 0.9 %
BILIRUB SERPL-MCNC: 0.4 MG/DL
BILIRUBIN URINE: NEGATIVE
BLOOD URINE: NEGATIVE
BUN SERPL-MCNC: 16 MG/DL
CALCIUM SERPL-MCNC: 9.2 MG/DL
CHLORIDE SERPL-SCNC: 100 MMOL/L
CHOLEST SERPL-MCNC: 216 MG/DL
CO2 SERPL-SCNC: 23 MMOL/L
COLOR: NORMAL
CREAT SERPL-MCNC: 1.03 MG/DL
EGFR: 79 ML/MIN/1.73M2
EOSINOPHIL # BLD AUTO: 0.11 K/UL
EOSINOPHIL NFR BLD AUTO: 2.5 %
ESTIMATED AVERAGE GLUCOSE: 120 MG/DL
GLUCOSE QUALITATIVE U: NEGATIVE
GLUCOSE SERPL-MCNC: 120 MG/DL
HBA1C MFR BLD HPLC: 5.8 %
HCT VFR BLD CALC: 42.2 %
HDLC SERPL-MCNC: 79 MG/DL
HGB BLD-MCNC: 13.5 G/DL
IMM GRANULOCYTES NFR BLD AUTO: 0.2 %
KETONES URINE: NEGATIVE
LDLC SERPL CALC-MCNC: 69 MG/DL
LEUKOCYTE ESTERASE URINE: NEGATIVE
LYMPHOCYTES # BLD AUTO: 1.68 K/UL
LYMPHOCYTES NFR BLD AUTO: 38.1 %
MAN DIFF?: NORMAL
MCHC RBC-ENTMCNC: 29.2 PG
MCHC RBC-ENTMCNC: 32 GM/DL
MCV RBC AUTO: 91.3 FL
MONOCYTES # BLD AUTO: 0.55 K/UL
MONOCYTES NFR BLD AUTO: 12.5 %
NEUTROPHILS # BLD AUTO: 2.02 K/UL
NEUTROPHILS NFR BLD AUTO: 45.8 %
NITRITE URINE: NEGATIVE
NONHDLC SERPL-MCNC: 137 MG/DL
PH URINE: 6
PLATELET # BLD AUTO: 225 K/UL
POTASSIUM SERPL-SCNC: 4.3 MMOL/L
PROT SERPL-MCNC: 7 G/DL
PROTEIN URINE: NEGATIVE
RBC # BLD: 4.62 M/UL
RBC # FLD: 15.2 %
SODIUM SERPL-SCNC: 138 MMOL/L
SPECIFIC GRAVITY URINE: 1.02
TRIGL SERPL-MCNC: 342 MG/DL
TSH SERPL-ACNC: 1.81 UIU/ML
UROBILINOGEN URINE: NORMAL
WBC # FLD AUTO: 4.41 K/UL

## 2022-03-23 NOTE — HISTORY OF PRESENT ILLNESS
[de-identified] : Mr. ELYSE HENRY is a 71 year old male, with hx of HTN, HLD, GERD, asthma, presents for follow up\par He is c/o worsening of low back pain x 1 week. Pain moderate, + radiation to RLE , denies weakness/ numbness b/l extremities \par He denies CP, SOB, N/V or abdominal pain

## 2022-03-23 NOTE — PLAN
[FreeTextEntry1] : 1. see plan \par \par 2. Elevated LFT\par will monitor LFT\par \par 3. HTN\par Low sodium diet\par cont Amlodipine 5 mg daily\par cont Atenolol 25 mg daily\par cont Losartan 100 mg daily\par \par 4. HLD\par low fat diet\par He reports he is not compliant with Crestor\par will check lipids today\par Crestor 5 mg daily\par \par Labs ordered pt to f/u for results\par \par

## 2022-03-23 NOTE — PHYSICAL EXAM
[No Acute Distress] : no acute distress [Well Nourished] : well nourished [Well Developed] : well developed [Well-Appearing] : well-appearing [Normal Sclera/Conjunctiva] : normal sclera/conjunctiva [EOMI] : extraocular movements intact [Normal Outer Ear/Nose] : the outer ears and nose were normal in appearance [Normal Oropharynx] : the oropharynx was normal [Normal TMs] : both tympanic membranes were normal [No JVD] : no jugular venous distention [No Lymphadenopathy] : no lymphadenopathy [Supple] : supple [No Respiratory Distress] : no respiratory distress  [No Accessory Muscle Use] : no accessory muscle use [Clear to Auscultation] : lungs were clear to auscultation bilaterally [Normal Rate] : normal rate  [Regular Rhythm] : with a regular rhythm [Normal S1, S2] : normal S1 and S2 [No Murmur] : no murmur heard [No Carotid Bruits] : no carotid bruits [Pedal Pulses Present] : the pedal pulses are present [No Edema] : there was no peripheral edema [Soft] : abdomen soft [Non Tender] : non-tender [Non-distended] : non-distended [No Masses] : no abdominal mass palpated [Normal Bowel Sounds] : normal bowel sounds [No HSM] : no HSM [Normal Posterior Cervical Nodes] : no posterior cervical lymphadenopathy [Normal Anterior Cervical Nodes] : no anterior cervical lymphadenopathy [No CVA Tenderness] : no CVA  tenderness [No Joint Swelling] : no joint swelling [Grossly Normal Strength/Tone] : grossly normal strength/tone [No Rash] : no rash [Coordination Grossly Intact] : coordination grossly intact [No Focal Deficits] : no focal deficits [Normal Gait] : normal gait [Normal Affect] : the affect was normal [Normal Insight/Judgement] : insight and judgment were intact [de-identified] : + l/s spinal tenderness

## 2022-04-12 ENCOUNTER — APPOINTMENT (OUTPATIENT)
Dept: INTERNAL MEDICINE | Facility: CLINIC | Age: 72
End: 2022-04-12

## 2022-09-07 ENCOUNTER — APPOINTMENT (OUTPATIENT)
Dept: CARDIOLOGY | Facility: CLINIC | Age: 72
End: 2022-09-07

## 2023-01-19 ENCOUNTER — APPOINTMENT (OUTPATIENT)
Dept: INTERNAL MEDICINE | Facility: CLINIC | Age: 73
End: 2023-01-19
Payer: COMMERCIAL

## 2023-01-19 PROCEDURE — 99214 OFFICE O/P EST MOD 30 MIN: CPT | Mod: 95

## 2023-01-19 NOTE — PLAN
[FreeTextEntry1] : \par \par 1. HTN\par Low sodium diet\par cont Amlodipine 5 mg daily\par cont Atenolol 25 mg daily\par cont Losartan 100 mg daily\par \par 2. HLD\par low fat diet\par Crestor 5 mg daily\par \par 3. GERD\par cont PPI\par \par Patient to come for in person exam and blood work next week\par \par Labs ordered pt to f/u for results\par \par

## 2023-01-19 NOTE — HISTORY OF PRESENT ILLNESS
[Home] : at home, [unfilled] , at the time of the visit. [Medical Office: ([facility])___] : at the medical office located in  [de-identified] : Mr. ELYSE HENRY is a 71 year old male, with hx of HTN, HLD, GERD, asthma, presents for follow up and meds renewal \par He run out some of his BP meds for few days.  Patient not compliant with regular follow-ups.\par He denies CP, SOB, N/V or abdominal pain

## 2023-02-08 ENCOUNTER — APPOINTMENT (OUTPATIENT)
Dept: INTERNAL MEDICINE | Facility: CLINIC | Age: 73
End: 2023-02-08

## 2023-03-15 ENCOUNTER — LABORATORY RESULT (OUTPATIENT)
Age: 73
End: 2023-03-15

## 2023-03-15 ENCOUNTER — APPOINTMENT (OUTPATIENT)
Dept: INTERNAL MEDICINE | Facility: CLINIC | Age: 73
End: 2023-03-15
Payer: COMMERCIAL

## 2023-03-15 VITALS
BODY MASS INDEX: 26.01 KG/M2 | WEIGHT: 192 LBS | HEIGHT: 72 IN | DIASTOLIC BLOOD PRESSURE: 91 MMHG | HEART RATE: 79 BPM | RESPIRATION RATE: 12 BRPM | TEMPERATURE: 97.2 F | SYSTOLIC BLOOD PRESSURE: 156 MMHG | OXYGEN SATURATION: 97 %

## 2023-03-15 VITALS — DIASTOLIC BLOOD PRESSURE: 90 MMHG | SYSTOLIC BLOOD PRESSURE: 150 MMHG

## 2023-03-15 DIAGNOSIS — K21.9 GASTRO-ESOPHAGEAL REFLUX DISEASE W/OUT ESOPHAGITIS: ICD-10-CM

## 2023-03-15 DIAGNOSIS — Z00.00 ENCOUNTER FOR GENERAL ADULT MEDICAL EXAMINATION W/OUT ABNORMAL FINDINGS: ICD-10-CM

## 2023-03-15 DIAGNOSIS — Z91.89 OTHER SPECIFIED PERSONAL RISK FACTORS, NOT ELSEWHERE CLASSIFIED: ICD-10-CM

## 2023-03-15 PROCEDURE — 99397 PER PM REEVAL EST PAT 65+ YR: CPT

## 2023-03-15 PROCEDURE — 99213 OFFICE O/P EST LOW 20 MIN: CPT | Mod: 25

## 2023-03-15 RX ORDER — ESOMEPRAZOLE MAGNESIUM 40 MG/1
40 CAPSULE, DELAYED RELEASE ORAL
Qty: 30 | Refills: 1 | Status: DISCONTINUED | COMMUNITY
Start: 2020-08-20 | End: 2023-03-15

## 2023-03-15 RX ORDER — TIZANIDINE 2 MG/1
2 TABLET ORAL
Qty: 20 | Refills: 0 | Status: DISCONTINUED | COMMUNITY
Start: 2022-03-09 | End: 2023-03-15

## 2023-03-16 LAB
25(OH)D3 SERPL-MCNC: 14.3 NG/ML
ALBUMIN SERPL ELPH-MCNC: 4 G/DL
ALP BLD-CCNC: 108 U/L
ALT SERPL-CCNC: 79 U/L
ANION GAP SERPL CALC-SCNC: 15 MMOL/L
APPEARANCE: CLEAR
AST SERPL-CCNC: 140 U/L
BASOPHILS # BLD AUTO: 0.04 K/UL
BASOPHILS NFR BLD AUTO: 0.7 %
BILIRUB SERPL-MCNC: 0.8 MG/DL
BILIRUBIN URINE: NEGATIVE
BLOOD URINE: NEGATIVE
BUN SERPL-MCNC: 17 MG/DL
CALCIUM SERPL-MCNC: 9.8 MG/DL
CHLORIDE SERPL-SCNC: 99 MMOL/L
CHOLEST SERPL-MCNC: 259 MG/DL
CO2 SERPL-SCNC: 22 MMOL/L
COLOR: YELLOW
CREAT SERPL-MCNC: 1.04 MG/DL
EGFR: 77 ML/MIN/1.73M2
EOSINOPHIL # BLD AUTO: 0.08 K/UL
EOSINOPHIL NFR BLD AUTO: 1.4 %
ESTIMATED AVERAGE GLUCOSE: 123 MG/DL
GLUCOSE QUALITATIVE U: NEGATIVE
GLUCOSE SERPL-MCNC: 113 MG/DL
HBA1C MFR BLD HPLC: 5.9 %
HCT VFR BLD CALC: 44.6 %
HDLC SERPL-MCNC: 43 MG/DL
HGB BLD-MCNC: 14.7 G/DL
IMM GRANULOCYTES NFR BLD AUTO: 0.5 %
KETONES URINE: NEGATIVE
LDLC SERPL CALC-MCNC: NORMAL MG/DL
LEUKOCYTE ESTERASE URINE: NEGATIVE
LYMPHOCYTES # BLD AUTO: 1.94 K/UL
LYMPHOCYTES NFR BLD AUTO: 35 %
MAN DIFF?: NORMAL
MCHC RBC-ENTMCNC: 30.8 PG
MCHC RBC-ENTMCNC: 33 GM/DL
MCV RBC AUTO: 93.5 FL
MONOCYTES # BLD AUTO: 0.94 K/UL
MONOCYTES NFR BLD AUTO: 17 %
NEUTROPHILS # BLD AUTO: 2.51 K/UL
NEUTROPHILS NFR BLD AUTO: 45.4 %
NITRITE URINE: NEGATIVE
NONHDLC SERPL-MCNC: 216 MG/DL
PH URINE: 6
PLATELET # BLD AUTO: 198 K/UL
POTASSIUM SERPL-SCNC: 4.5 MMOL/L
PROT SERPL-MCNC: 7.5 G/DL
PROTEIN URINE: ABNORMAL
PSA SERPL-MCNC: 0.95 NG/ML
RBC # BLD: 4.77 M/UL
RBC # FLD: 14.6 %
SODIUM SERPL-SCNC: 136 MMOL/L
SPECIFIC GRAVITY URINE: 1.03
TRIGL SERPL-MCNC: 798 MG/DL
TSH SERPL-ACNC: 2.92 UIU/ML
UROBILINOGEN URINE: ABNORMAL
VIT B12 SERPL-MCNC: 1357 PG/ML
WBC # FLD AUTO: 5.54 K/UL

## 2023-03-17 DIAGNOSIS — R80.9 PROTEINURIA, UNSPECIFIED: ICD-10-CM

## 2023-04-17 ENCOUNTER — APPOINTMENT (OUTPATIENT)
Dept: CARDIOLOGY | Facility: CLINIC | Age: 73
End: 2023-04-17

## 2023-04-21 ENCOUNTER — APPOINTMENT (OUTPATIENT)
Dept: GASTROENTEROLOGY | Facility: CLINIC | Age: 73
End: 2023-04-21

## 2023-04-27 ENCOUNTER — NON-APPOINTMENT (OUTPATIENT)
Age: 73
End: 2023-04-27

## 2023-04-27 ENCOUNTER — APPOINTMENT (OUTPATIENT)
Dept: CARDIOLOGY | Facility: CLINIC | Age: 73
End: 2023-04-27
Payer: COMMERCIAL

## 2023-04-27 ENCOUNTER — APPOINTMENT (OUTPATIENT)
Dept: INTERNAL MEDICINE | Facility: CLINIC | Age: 73
End: 2023-04-27
Payer: COMMERCIAL

## 2023-04-27 VITALS
SYSTOLIC BLOOD PRESSURE: 105 MMHG | DIASTOLIC BLOOD PRESSURE: 65 MMHG | SYSTOLIC BLOOD PRESSURE: 96 MMHG | BODY MASS INDEX: 26.14 KG/M2 | WEIGHT: 193 LBS | OXYGEN SATURATION: 97 % | HEIGHT: 72 IN | DIASTOLIC BLOOD PRESSURE: 70 MMHG | HEART RATE: 72 BPM | RESPIRATION RATE: 12 BRPM

## 2023-04-27 DIAGNOSIS — M25.511 PAIN IN RIGHT SHOULDER: ICD-10-CM

## 2023-04-27 DIAGNOSIS — R06.09 OTHER FORMS OF DYSPNEA: ICD-10-CM

## 2023-04-27 DIAGNOSIS — K76.0 FATTY (CHANGE OF) LIVER, NOT ELSEWHERE CLASSIFIED: ICD-10-CM

## 2023-04-27 PROCEDURE — 99204 OFFICE O/P NEW MOD 45 MIN: CPT | Mod: 25

## 2023-04-27 PROCEDURE — 93000 ELECTROCARDIOGRAM COMPLETE: CPT

## 2023-04-27 PROCEDURE — 99214 OFFICE O/P EST MOD 30 MIN: CPT

## 2023-04-27 RX ORDER — OMEGA-3-ACID ETHYL ESTERS CAPSULES 1 G/1
1 CAPSULE, LIQUID FILLED ORAL
Qty: 360 | Refills: 1 | Status: ACTIVE | COMMUNITY
Start: 2023-03-17 | End: 1900-01-01

## 2023-04-27 NOTE — REVIEW OF SYSTEMS
[Negative] : Heme/Lymph [Headache] : no headache [Fainting] : no fainting [Confusion] : no confusion [FreeTextEntry9] : + RUE tenderness [de-identified] : +

## 2023-04-27 NOTE — PHYSICAL EXAM
[No Acute Distress] : no acute distress [Normal Sclera/Conjunctiva] : normal sclera/conjunctiva [Normal Outer Ear/Nose] : the outer ears and nose were normal in appearance [Normal Oropharynx] : the oropharynx was normal [No Lymphadenopathy] : no lymphadenopathy [No Respiratory Distress] : no respiratory distress  [No Accessory Muscle Use] : no accessory muscle use [Normal Rate] : normal rate  [Regular Rhythm] : with a regular rhythm [No Carotid Bruits] : no carotid bruits [No Varicosities] : no varicosities [Pedal Pulses Present] : the pedal pulses are present [No Edema] : there was no peripheral edema [Soft] : abdomen soft [Non Tender] : non-tender [Non-distended] : non-distended [No Masses] : no abdominal mass palpated [Normal Bowel Sounds] : normal bowel sounds [No CVA Tenderness] : no CVA  tenderness [No Joint Swelling] : no joint swelling [Grossly Normal Strength/Tone] : grossly normal strength/tone [Coordination Grossly Intact] : coordination grossly intact [No Focal Deficits] : no focal deficits [Normal Gait] : normal gait [Normal Affect] : the affect was normal [Normal Insight/Judgement] : insight and judgment were intact [de-identified] : + cervical spinal tenderness [de-identified] : +tenderness RT shoulder  with ROM

## 2023-04-27 NOTE — PHYSICAL EXAM
[No Acute Distress] : no acute distress [Normal Sclera/Conjunctiva] : normal sclera/conjunctiva [Normal Outer Ear/Nose] : the outer ears and nose were normal in appearance [Normal Oropharynx] : the oropharynx was normal [No Lymphadenopathy] : no lymphadenopathy [No Respiratory Distress] : no respiratory distress  [No Accessory Muscle Use] : no accessory muscle use [Normal Rate] : normal rate  [Regular Rhythm] : with a regular rhythm [No Carotid Bruits] : no carotid bruits [No Varicosities] : no varicosities [Pedal Pulses Present] : the pedal pulses are present [No Edema] : there was no peripheral edema [Soft] : abdomen soft [Non Tender] : non-tender [Non-distended] : non-distended [No Masses] : no abdominal mass palpated [Normal Bowel Sounds] : normal bowel sounds [No CVA Tenderness] : no CVA  tenderness [No Joint Swelling] : no joint swelling [Grossly Normal Strength/Tone] : grossly normal strength/tone [Coordination Grossly Intact] : coordination grossly intact [No Focal Deficits] : no focal deficits [Normal Gait] : normal gait [Normal Affect] : the affect was normal [Normal Insight/Judgement] : insight and judgment were intact [de-identified] : + cervical spinal tenderness [de-identified] : +tenderness RT shoulder  with ROM

## 2023-04-27 NOTE — PLAN
[FreeTextEntry1] : Annual Physical \par Labs ordered\par \par HTN\par Low sodium diet\par cont Amlodipine 5 mg daily\par cont Atenolol 25 mg daily\par cont Losartan 100 mg daily\par \par HLD\par low fat diet/ low cholesterol diet// increase physical activity\par Rosuvastatin 5 mg daily\par \par GERD\par cont Pepcid 40 mg\par \par Screenings\par GI referral for colonoscopy\par ?DEXA scan\par \par Right shoulder pain\par Ortho Referral\par Xray \par ?Xray of Right arm\par Patient refused NSAID\par \par Labs ordered pt to f/u for results in 1 week\par \par

## 2023-04-27 NOTE — HISTORY OF PRESENT ILLNESS
[FreeTextEntry1] : Naman is a 72-year-old gentleman ex-smoker, +FH, HTN, HLD, alcohol use who presents for evaluation. Elevated liver enzymes and stopped rosuvastatin in March. Sono with cyst only and fatty liver. Now compliant with medications. Busy working no other exercise. Knows needs to stop etoh completely

## 2023-04-27 NOTE — HISTORY OF PRESENT ILLNESS
[de-identified] : Mr. ELYSE HENRY is a 72 year old male, with hx of HTN, HLD, GERD, asthma, presents for an annual physical.\par \par Patient states that he has intermittent generalize pain that travels to different areas of his body- he is currently experiencing RUE pain. . He complaining of a sudden onset of right neck and shoulder pain that radiates down his right arm. This started about 2 weeks ago at work (patient is a ). + RUE tenderness especially with ROM. Rates the pain 7/10 and describes it as intermittent sharp pain. \par Patient cannot recall any trauma. \par Patient states that he takes acetaminophen for the pain with mild relief. \par \par Patient states diet is could but admits exercise can be better. Patient states that he has not been been compliant with his Rosuvastatin. \par Denies CP, SOB, HA, fevers, chills abdominal pain. +tenderness with ROM of RUE. +tingling in b/l hands. \par Denies caffeine intake. However drinks 4 glasses of wine a day. \par

## 2023-04-27 NOTE — HISTORY OF PRESENT ILLNESS
[de-identified] : Mr. ELYSE HENRY is a 72 year old male, with hx of HTN, HLD, GERD, asthma, presents for a follow up on abn labs and severe hyperlipidemia.\par Pt has abn LFT , he stop taking Crestor 3 weeks ago . He admits to excessive drinking > 3 glass of wine daily .\par He denies N, V, abd pain, CO/SOB, dizziness \par \par

## 2023-04-27 NOTE — DISCUSSION/SUMMARY
[FreeTextEntry1] : The patient is a 72-year-old gentleman ex-smoker, etoh use, HTN< HLD, fatty liver with markedly abnormal profile. \par #1 CV- ECG nl, echo next visit\par #2 HTN- low BP, decrease amlodipine to 5mg daily, c/w atenolol and losartan for now \par #3 HLD- rosuvastatin on hold, start vascepa 2gm bid, smaples given\par #4 General- states he will stop alcohol [EKG obtained to assist in diagnosis and management of assessed problem(s)] : EKG obtained to assist in diagnosis and management of assessed problem(s)

## 2023-04-27 NOTE — HEALTH RISK ASSESSMENT
[Good] : ~his/her~  mood as  good [Yes] : Yes [4 or more  times a week (4 pts)] : 4 or more  times a week (4 points) [3 or 4 (1 pt)] : 3 or 4  (1 point) [No falls in past year] : Patient reported no falls in the past year [0] : 2) Feeling down, depressed, or hopeless: Not at all (0) [PHQ-2 Negative - No further assessment needed] : PHQ-2 Negative - No further assessment needed [None] : None [Alone] : lives alone [Employed] : employed [Fully functional (bathing, dressing, toileting, transferring, walking, feeding)] : Fully functional (bathing, dressing, toileting, transferring, walking, feeding) [Smoke Detector] : smoke detector [Carbon Monoxide Detector] : carbon monoxide detector [Sunscreen] : uses sunscreen [Former] : Former [Reports changes in hearing] : Reports no changes in hearing [Reports changes in vision] : Reports no changes in vision [Reports changes in dental health] : Reports no changes in dental health [BoneDensityDate] : never [ColonoscopyDate] : years ago [FreeTextEntry2] :  [de-identified] : g [de-identified] : quit over 30 years ago

## 2023-04-27 NOTE — PLAN
[FreeTextEntry1] : Labs d/w pt \par \par see plan \par \par HTN\par Low sodium diet\par cont Amlodipine 5 mg daily\par cont Atenolol 25 mg daily\par cont Losartan 100 mg daily\par \par HLD/ Abn LFT/ Fatty liver\par low fat diet/ low cholesterol diet// increase physical activity\par off Rosuvastatin\par alcohol cessation d/w pt \par lipid specialist/ GI/ cardio follow up\par \par RT shoulder arthritis\par following with ortho\par cont PT\par \par \par \par \par

## 2023-05-05 LAB
ALBUMIN SERPL ELPH-MCNC: 3.4 G/DL
ALP BLD-CCNC: 117 U/L
ALT SERPL-CCNC: 60 U/L
ANION GAP SERPL CALC-SCNC: 13 MMOL/L
AST SERPL-CCNC: 79 U/L
BILIRUB SERPL-MCNC: 0.5 MG/DL
BUN SERPL-MCNC: 23 MG/DL
CALCIUM SERPL-MCNC: 9 MG/DL
CHLORIDE SERPL-SCNC: 99 MMOL/L
CO2 SERPL-SCNC: 22 MMOL/L
CREAT SERPL-MCNC: 1.1 MG/DL
EGFR: 72 ML/MIN/1.73M2
GLUCOSE SERPL-MCNC: 105 MG/DL
HAV IGM SER QL: NONREACTIVE
HBV CORE IGM SER QL: NONREACTIVE
HBV SURFACE AG SER QL: NONREACTIVE
HCV AB SER QL: NONREACTIVE
HCV S/CO RATIO: 0.11 S/CO
POTASSIUM SERPL-SCNC: 4.9 MMOL/L
PROT SERPL-MCNC: 6.2 G/DL
SODIUM SERPL-SCNC: 134 MMOL/L

## 2023-06-22 ENCOUNTER — APPOINTMENT (OUTPATIENT)
Dept: CARDIOLOGY | Facility: CLINIC | Age: 73
End: 2023-06-22
Payer: COMMERCIAL

## 2023-06-22 ENCOUNTER — APPOINTMENT (OUTPATIENT)
Dept: INTERNAL MEDICINE | Facility: CLINIC | Age: 73
End: 2023-06-22
Payer: COMMERCIAL

## 2023-06-22 VITALS
SYSTOLIC BLOOD PRESSURE: 161 MMHG | HEIGHT: 72 IN | DIASTOLIC BLOOD PRESSURE: 86 MMHG | RESPIRATION RATE: 12 BRPM | WEIGHT: 197 LBS | HEART RATE: 68 BPM | TEMPERATURE: 97.2 F | OXYGEN SATURATION: 98 % | BODY MASS INDEX: 26.68 KG/M2

## 2023-06-22 DIAGNOSIS — R79.89 OTHER SPECIFIED ABNORMAL FINDINGS OF BLOOD CHEMISTRY: ICD-10-CM

## 2023-06-22 DIAGNOSIS — R73.09 OTHER ABNORMAL GLUCOSE: ICD-10-CM

## 2023-06-22 DIAGNOSIS — I71.21 ANEURYSM OF THE ASCENDING AORTA, WITHOUT RUPTURE: ICD-10-CM

## 2023-06-22 DIAGNOSIS — E78.2 MIXED HYPERLIPIDEMIA: ICD-10-CM

## 2023-06-22 PROCEDURE — 99213 OFFICE O/P EST LOW 20 MIN: CPT | Mod: 25

## 2023-06-22 PROCEDURE — 99214 OFFICE O/P EST MOD 30 MIN: CPT

## 2023-06-22 PROCEDURE — 93000 ELECTROCARDIOGRAM COMPLETE: CPT

## 2023-06-22 NOTE — PHYSICAL EXAM
[No Acute Distress] : no acute distress [Normal Sclera/Conjunctiva] : normal sclera/conjunctiva [Normal Outer Ear/Nose] : the outer ears and nose were normal in appearance [Normal Oropharynx] : the oropharynx was normal [Normal TMs] : both tympanic membranes were normal [No Lymphadenopathy] : no lymphadenopathy [No Respiratory Distress] : no respiratory distress  [No Accessory Muscle Use] : no accessory muscle use [Normal Rate] : normal rate  [Regular Rhythm] : with a regular rhythm [No Carotid Bruits] : no carotid bruits [No Varicosities] : no varicosities [Pedal Pulses Present] : the pedal pulses are present [No Edema] : there was no peripheral edema [Soft] : abdomen soft [Non Tender] : non-tender [Non-distended] : non-distended [No Masses] : no abdominal mass palpated [Normal Bowel Sounds] : normal bowel sounds [No CVA Tenderness] : no CVA  tenderness [No Spinal Tenderness] : no spinal tenderness [No Joint Swelling] : no joint swelling [Grossly Normal Strength/Tone] : grossly normal strength/tone [Coordination Grossly Intact] : coordination grossly intact [No Focal Deficits] : no focal deficits [Normal Gait] : normal gait [Normal Affect] : the affect was normal [Normal Insight/Judgement] : insight and judgment were intact

## 2023-06-22 NOTE — HISTORY OF PRESENT ILLNESS
[de-identified] : Mr. ELYSE HENRY is a 72 year old male, with hx of HTN, HLD, GERD, asthma,Abn LFT  presents for a follow up on chronic problems \par Pt has abn LFT , he is off  Crestor since March  . He continue to drink  drinking 2- 3 glass of wine daily .\par He denies N, V, abd pain, CO/SOB, dizziness \par \par

## 2023-06-22 NOTE — PLAN
[FreeTextEntry1] : Labs d/w pt \par \par see plan \par \par HTN, poor control \par Low sodium diet\par increase  Amlodipine 5 mg BID\par cont Atenolol 50 mg daily\par cont Losartan 100 mg daily\par \par HLD/ Abn LFT/ Fatty liver\par low fat diet/ low cholesterol diet// increase physical activity\par off Rosuvastatin\par alcohol cessation d/w pt \par cont Vascepa \par \par \par \par

## 2023-06-23 PROBLEM — I71.21 ANEURYSM OF AORTIC ROOT: Status: ACTIVE | Noted: 2018-02-07

## 2023-06-23 PROBLEM — R79.89 ABNORMAL LFTS: Status: ACTIVE | Noted: 2018-02-07

## 2023-06-23 PROBLEM — E78.2 ELEVATED CHOLESTEROL WITH ELEVATED TRIGLYCERIDES: Status: ACTIVE | Noted: 2021-05-12

## 2023-06-23 NOTE — DISCUSSION/SUMMARY
[FreeTextEntry1] : The patient is a 72-year-old gentleman ex-smoker, etoh use, HTN, HLD, fatty liver who is hypertensive and elevated liver enzymes.\par #1 CV- ECG nl, echo ordered\par #2 HTN- increase amlodipine to 5mg bid, c/w atenolol and losartan\par #3 HLD- rosuvastatin on hold, c/w vascepa 2gm bid, fasting labs ordered by Dr. Peter\par #4 General- if stops drinking his numbers will improve. Consider repatha pending above results. BP will also improve without alcohol as we have seen in the past.  [EKG obtained to assist in diagnosis and management of assessed problem(s)] : EKG obtained to assist in diagnosis and management of assessed problem(s)

## 2023-06-23 NOTE — HISTORY OF PRESENT ILLNESS
[FreeTextEntry1] : Naman is off rosuvastatin secondary to elevated liver enzymes, Still drinks 2-3 glasses of wine daily, Gets up several times at night to urinate.

## 2023-06-28 ENCOUNTER — APPOINTMENT (OUTPATIENT)
Dept: CARDIOLOGY | Facility: CLINIC | Age: 73
End: 2023-06-28

## 2023-07-27 ENCOUNTER — NON-APPOINTMENT (OUTPATIENT)
Age: 73
End: 2023-07-27

## 2023-07-27 ENCOUNTER — APPOINTMENT (OUTPATIENT)
Dept: INTERNAL MEDICINE | Facility: CLINIC | Age: 73
End: 2023-07-27
Payer: COMMERCIAL

## 2023-07-27 ENCOUNTER — APPOINTMENT (OUTPATIENT)
Dept: CARDIOLOGY | Facility: CLINIC | Age: 73
End: 2023-07-27

## 2023-07-27 ENCOUNTER — APPOINTMENT (OUTPATIENT)
Dept: CARDIOLOGY | Facility: CLINIC | Age: 73
End: 2023-07-27
Payer: COMMERCIAL

## 2023-07-27 VITALS
HEIGHT: 72 IN | SYSTOLIC BLOOD PRESSURE: 115 MMHG | RESPIRATION RATE: 12 BRPM | BODY MASS INDEX: 26.82 KG/M2 | OXYGEN SATURATION: 96 % | HEART RATE: 100 BPM | WEIGHT: 198 LBS | DIASTOLIC BLOOD PRESSURE: 68 MMHG

## 2023-07-27 DIAGNOSIS — I10 ESSENTIAL (PRIMARY) HYPERTENSION: ICD-10-CM

## 2023-07-27 DIAGNOSIS — E78.5 HYPERLIPIDEMIA, UNSPECIFIED: ICD-10-CM

## 2023-07-27 DIAGNOSIS — I48.91 UNSPECIFIED ATRIAL FIBRILLATION: ICD-10-CM

## 2023-07-27 PROCEDURE — 99214 OFFICE O/P EST MOD 30 MIN: CPT | Mod: 25

## 2023-07-27 PROCEDURE — 93000 ELECTROCARDIOGRAM COMPLETE: CPT

## 2023-07-27 PROCEDURE — 99214 OFFICE O/P EST MOD 30 MIN: CPT

## 2023-07-27 RX ORDER — APIXABAN 5 MG/1
5 TABLET, FILM COATED ORAL
Qty: 180 | Refills: 3 | Status: ACTIVE | COMMUNITY
Start: 2023-07-27

## 2023-07-27 RX ORDER — LOSARTAN POTASSIUM 100 MG/1
100 TABLET, FILM COATED ORAL
Qty: 90 | Refills: 1 | Status: DISCONTINUED | COMMUNITY
Start: 2020-04-21 | End: 2023-07-27

## 2023-07-27 RX ORDER — ATENOLOL 50 MG/1
50 TABLET ORAL
Qty: 90 | Refills: 0 | Status: DISCONTINUED | COMMUNITY
Start: 2020-12-18 | End: 2023-07-27

## 2023-07-27 RX ORDER — AMLODIPINE BESYLATE 5 MG/1
5 TABLET ORAL TWICE DAILY
Qty: 180 | Refills: 1 | Status: DISCONTINUED | COMMUNITY
Start: 2017-06-23 | End: 2023-07-27

## 2023-07-27 RX ORDER — ROSUVASTATIN CALCIUM 20 MG/1
20 TABLET, FILM COATED ORAL
Qty: 90 | Refills: 0 | Status: DISCONTINUED | COMMUNITY
Start: 2020-10-27 | End: 2023-07-27

## 2023-07-27 NOTE — PLAN
[FreeTextEntry1] : Follow-up\par  \par \par HTN/HLD\par Low sodium, low cholesterol diet/ increased physical activity \par cont Metoprolol 25 mg daily \par Following Dr. Garcia

## 2023-07-27 NOTE — PHYSICAL EXAM
[No Acute Distress] : no acute distress [Normal Sclera/Conjunctiva] : normal sclera/conjunctiva [EOMI] : extraocular movements intact [Normal Outer Ear/Nose] : the outer ears and nose were normal in appearance [Normal Oropharynx] : the oropharynx was normal [No JVD] : no jugular venous distention [No Lymphadenopathy] : no lymphadenopathy [Supple] : supple [Thyroid Normal, No Nodules] : the thyroid was normal and there were no nodules present [No Respiratory Distress] : no respiratory distress  [No Accessory Muscle Use] : no accessory muscle use [Clear to Auscultation] : lungs were clear to auscultation bilaterally [Normal Rate] : normal rate  [Regular Rhythm] : with a regular rhythm [Normal S1, S2] : normal S1 and S2 [No Murmur] : no murmur heard [No Carotid Bruits] : no carotid bruits [No Varicosities] : no varicosities [Pedal Pulses Present] : the pedal pulses are present [No Edema] : there was no peripheral edema [No Extremity Clubbing/Cyanosis] : no extremity clubbing/cyanosis [Soft] : abdomen soft [Non Tender] : non-tender [Non-distended] : non-distended [No Masses] : no abdominal mass palpated [No HSM] : no HSM [Normal Bowel Sounds] : normal bowel sounds [Normal Posterior Cervical Nodes] : no posterior cervical lymphadenopathy [Normal Anterior Cervical Nodes] : no anterior cervical lymphadenopathy [No CVA Tenderness] : no CVA  tenderness [No Spinal Tenderness] : no spinal tenderness [No Joint Swelling] : no joint swelling [Grossly Normal Strength/Tone] : grossly normal strength/tone [No Rash] : no rash [Coordination Grossly Intact] : coordination grossly intact [No Focal Deficits] : no focal deficits [Normal Gait] : normal gait [Normal Affect] : the affect was normal [Normal Insight/Judgement] : insight and judgment were intact [de-identified] : LCW zio patch in place

## 2023-07-27 NOTE — HISTORY OF PRESENT ILLNESS
[de-identified] : Patient  is a 72  year old male, with hx of HTN, HLD, GERD, asthma, presents for follow-up \par \par Patient report he was hospitalized at [facility] from 7/16- 7/18. He reports that she was having dizziness and palpitations. He was diagnosis with Afib. He reports that he was discharged on Eliquis but does not take he reports that he is concerned about bleeding. He reports he was seen by Dr. Garcia and expressed his concerns. \par Currently he feels well, Denies any CP, SOB, HA, Dizziness, N/V or abdominal pain

## 2023-07-30 PROBLEM — I48.91 AFIB: Status: ACTIVE | Noted: 2023-07-27

## 2023-07-30 PROBLEM — E78.5 HYPERLIPIDEMIA: Status: ACTIVE | Noted: 2020-06-12

## 2023-07-30 PROBLEM — I10 HYPERTENSION: Status: ACTIVE | Noted: 2018-12-24

## 2023-07-30 NOTE — DISCUSSION/SUMMARY
[FreeTextEntry1] : The patient is a 72-year-old gentleman ex-smoker, etoh use, HTN, HLD, fatty liver s/p hospitalization ? afib who is off all antihypertensives. #1 CV- ECG nl, obtain results from [facility], refusing eliquis secondary to bleeding risk #2 HTN- off amlodipine 5mg bid, atenolol and losartan, c/w metoprolol #3 HLD- rosuvastatin on hold, c/w vascepa 2gm bid, fasting labs ordered by Dr. Peter #4 General- if stops drinking his numbers will improve. Consider repatha pending above results. BP will also improve without alcohol as we have seen in the past.  [EKG obtained to assist in diagnosis and management of assessed problem(s)] : EKG obtained to assist in diagnosis and management of assessed problem(s)

## 2023-07-30 NOTE — HISTORY OF PRESENT ILLNESS
[FreeTextEntry1] : Naman is s/p hospitalization at [facility] with abdominal pain then palpitations and dizziness. He put himself on liquid diet and felt better. He was given eliquis and metoprolol but not taking eliquis. Stopped atenolol, losartan and amlodipine.

## 2023-09-18 ENCOUNTER — RX RENEWAL (OUTPATIENT)
Age: 73
End: 2023-09-18

## 2023-09-18 RX ORDER — METOPROLOL TARTRATE 25 MG/1
25 TABLET, FILM COATED ORAL 3 TIMES DAILY
Qty: 270 | Refills: 1 | Status: ACTIVE | COMMUNITY
Start: 2023-07-27 | End: 1900-01-01

## 2023-09-21 ENCOUNTER — APPOINTMENT (OUTPATIENT)
Dept: INTERNAL MEDICINE | Facility: CLINIC | Age: 73
End: 2023-09-21

## 2023-09-21 ENCOUNTER — APPOINTMENT (OUTPATIENT)
Dept: CARDIOLOGY | Facility: CLINIC | Age: 73
End: 2023-09-21

## 2023-09-21 ENCOUNTER — APPOINTMENT (OUTPATIENT)
Dept: ELECTROPHYSIOLOGY | Facility: CLINIC | Age: 73
End: 2023-09-21

## 2024-07-15 NOTE — CARDIOLOGY SUMMARY
Mohs Case Number: [id number] [[id number]] : 7/27/23 sinus rhythm normal  Previous Accession (Optional): [id number] Biopsy Photograph Reviewed: Yes Referring Physician (Optional): YOJANA Benavides Consent Type: Consent 1 (Standard) Eye Shield Used: No Initial Size Of Lesion: 0.8 X Size Of Lesion In Cm (Optional): 0 Number Of Stages: 1 Repair Type: Complex Repair Which Instrument Did You Use For Dermabrasion?: Wire Brush Which Eyelid Repair Cpt Are You Using?: 82760 Oculoplastic Surgeon Procedure Text (A): After obtaining clear surgical margins the patient was sent to oculoplastics for surgical repair.  The patient understands they will receive post-surgical care and follow-up from the referring physician's office. Otolaryngologist Procedure Text (A): After obtaining clear surgical margins the patient was sent to otolaryngology for surgical repair.  The patient understands they will receive post-surgical care and follow-up from the referring physician's office. Plastic Surgeon Procedure Text (A): After obtaining clear surgical margins the patient was sent to plastics for surgical repair.  The patient understands they will receive post-surgical care and follow-up from the referring physician's office. Mid-Level Procedure Text (A): After obtaining clear surgical margins the patient was sent to a mid-level provider for surgical repair.  The patient understands they will receive post-surgical care and follow-up from the mid-level provider. Provider Procedure Text (A): After obtaining clear surgical margins the defect was repaired by another provider. Asc Procedure Text (A): After obtaining clear surgical margins the patient was sent to an ASC for surgical repair.  The patient understands they will receive post-surgical care and follow-up from the ASC physician. Simple / Intermediate / Complex Repair - Final Wound Length In Cm: 2.6 Suturegard Retention Suture: 2-0 Nylon Retention Suture Bite Size: 3 mm Length To Time In Minutes Device Was In Place: 10 Width Of Defect Perpendicular To Closure In Cm (Required): 1.2 Undermining Type: Entire Wound Debridement Text: The wound edges were debrided prior to proceeding with the closure to facilitate wound healing. Helical Rim Text: The closure involved the helical rim. Vermilion Border Text: The closure involved the vermilion border. Nostril Rim Text: The closure involved the nostril rim. Retention Suture Text: Retention sutures were placed to support the closure and prevent dehiscence. Location Indication Override (Is Already Calculated Based On Selected Body Location): Area M Area H Indication Text: Tumors in this location are included in Area H (eyelids, eyebrows, nose, lips, chin, ear, pre-auricular, post-auricular, temple, genitalia, hands, feet, ankles and areola).  Tissue conservation is critical in these anatomic locations. Area M Indication Text: Tumors in this location are included in Area M (cheek, forehead, scalp, neck, jawline and pretibial skin).  Mohs surgery is indicated for tumors in these anatomic locations. Area L Indication Text: Tumors in this location are included in Area L (trunk and extremities).  Mohs surgery is indicated for larger tumors, or tumors with aggressive histologic features, in these anatomic locations. Tumor Debulked?: curette Depth Of Tumor Invasion (For Histology): tumor not visualized Perineural Invasion (For Histology - Be Specific If Possible): absent Special Stains Stage 1 - Results: Base On Clearance Noted Above Stage 2: Additional Anesthesia Type: 2% lidocaine with epinephrine and a 2:1 solution of Sodium Bicarbonate 8.4% Stage 3: Additional Anesthesia Type: 1% lidocaine with epinephrine Staging Info: By selecting yes to the question above you will include information on AJCC 8 tumor staging in your Mohs note. Information on tumor staging will be automatically added for SCCs on the head and neck. AJCC 8 includes tumor size, tumor depth, perineural involvement and bone invasion. Tumor Depth: Less than 6mm from granular layer and no invasion beyond the subcutaneous fat Why Was The Change Made?: Please Select the Appropriate Response Medical Necessity Statement: Based on my medical judgement, Mohs surgery is the most appropriate treatment for this cancer compared to other treatments. Alternatives Discussed Intro (Do Not Add Period): I discussed alternative treatments to Mohs surgery and specifically discussed the risks and benefits of Consent 1/Introductory Paragraph: The rationale for Mohs was explained to the patient and verbal consent was obtained. The risks, benefits and alternatives to therapy were discussed in detail. Specifically, the risks of infection, scarring, bleeding, prolonged wound healing, incomplete removal, allergy to anesthesia, nerve injury and recurrence were addressed. Prior to the procedure, the treatment site was clearly identified and confirmed by the patient. All components of Universal Protocol/PAUSE Rule completed. Consent 2/Introductory Paragraph: Mohs surgery was explained to the patient and consent was obtained. The risks, benefits and alternatives to therapy were discussed in detail. Specifically, the risks of infection, scarring, bleeding, prolonged wound healing, incomplete removal, allergy to anesthesia, nerve injury and recurrence were addressed. Prior to the procedure, the treatment site was clearly identified and confirmed by the patient. All components of Universal Protocol/PAUSE Rule completed. Consent 3/Introductory Paragraph: I gave the patient a chance to ask questions they had about the procedure.  Following this I explained the Mohs procedure and consent was obtained. The risks, benefits and alternatives to therapy were discussed in detail. Specifically, the risks of infection, scarring, bleeding, prolonged wound healing, incomplete removal, allergy to anesthesia, nerve injury and recurrence were addressed. Prior to the procedure, the treatment site was clearly identified and confirmed by the patient. All components of Universal Protocol/PAUSE Rule completed. Consent (Temporal Branch)/Introductory Paragraph: The rationale for Mohs was explained to the patient and consent was obtained. The risks, benefits and alternatives to therapy were discussed in detail. Specifically, the risks of damage to the temporal branch of the facial nerve, infection, scarring, bleeding, prolonged wound healing, incomplete removal, allergy to anesthesia, and recurrence were addressed. Prior to the procedure, the treatment site was clearly identified and confirmed by the patient. All components of Universal Protocol/PAUSE Rule completed. Consent (Marginal Mandibular)/Introductory Paragraph: The rationale for Mohs was explained to the patient and consent was obtained. The risks, benefits and alternatives to therapy were discussed in detail. Specifically, the risks of damage to the marginal mandibular branch of the facial nerve, infection, scarring, bleeding, prolonged wound healing, incomplete removal, allergy to anesthesia, and recurrence were addressed. Prior to the procedure, the treatment site was clearly identified and confirmed by the patient. All components of Universal Protocol/PAUSE Rule completed. Consent (Spinal Accessory)/Introductory Paragraph: The rationale for Mohs was explained to the patient and consent was obtained. The risks, benefits and alternatives to therapy were discussed in detail. Specifically, the risks of damage to the spinal accessory nerve, infection, scarring, bleeding, prolonged wound healing, incomplete removal, allergy to anesthesia, and recurrence were addressed. Prior to the procedure, the treatment site was clearly identified and confirmed by the patient. All components of Universal Protocol/PAUSE Rule completed. Consent (Near Eyelid Margin)/Introductory Paragraph: The rationale for Mohs was explained to the patient and consent was obtained. The risks, benefits and alternatives to therapy were discussed in detail. Specifically, the risks of ectropion or eyelid deformity, infection, scarring, bleeding, prolonged wound healing, incomplete removal, allergy to anesthesia, nerve injury and recurrence were addressed. Prior to the procedure, the treatment site was clearly identified and confirmed by the patient. All components of Universal Protocol/PAUSE Rule completed. Consent (Ear)/Introductory Paragraph: The rationale for Mohs was explained to the patient and consent was obtained. The risks, benefits and alternatives to therapy were discussed in detail. Specifically, the risks of ear deformity, infection, scarring, bleeding, prolonged wound healing, incomplete removal, allergy to anesthesia, nerve injury and recurrence were addressed. Prior to the procedure, the treatment site was clearly identified and confirmed by the patient. All components of Universal Protocol/PAUSE Rule completed. Consent (Nose)/Introductory Paragraph: The rationale for Mohs was explained to the patient and consent was obtained. The risks, benefits and alternatives to therapy were discussed in detail. Specifically, the risks of nasal deformity, changes in the flow of air through the nose, infection, scarring, bleeding, prolonged wound healing, incomplete removal, allergy to anesthesia, nerve injury and recurrence were addressed. Prior to the procedure, the treatment site was clearly identified and confirmed by the patient. All components of Universal Protocol/PAUSE Rule completed. Consent (Lip)/Introductory Paragraph: The rationale for Mohs was explained to the patient and consent was obtained. The risks, benefits and alternatives to therapy were discussed in detail. Specifically, the risks of lip deformity, changes in the oral aperture, infection, scarring, bleeding, prolonged wound healing, incomplete removal, allergy to anesthesia, nerve injury and recurrence were addressed. Prior to the procedure, the treatment site was clearly identified and confirmed by the patient. All components of Universal Protocol/PAUSE Rule completed. Consent (Scalp)/Introductory Paragraph: The rationale for Mohs was explained to the patient and consent was obtained. The risks, benefits and alternatives to therapy were discussed in detail. Specifically, the risks of changes in hair growth pattern secondary to repair, infection, scarring, bleeding, prolonged wound healing, incomplete removal, allergy to anesthesia, nerve injury and recurrence were addressed. Prior to the procedure, the treatment site was clearly identified and confirmed by the patient. All components of Universal Protocol/PAUSE Rule completed. Detail Level: Detailed Postop Diagnosis: same Anesthesia Type: 2% Xylocaine with epinephrine and sodium bicarbonate Anesthesia Volume In Cc: 3 Hemostasis: Electrocautery Estimated Blood Loss (Cc): minimal Anesthesia Type: 2% lidocaine with epinephrine and a 1:10 solution of 8.4% sodium bicarbonate Brow Lift Text: A midfrontal incision was made medially to the defect to allow access to the tissues just superior to the left eyebrow. Following careful dissection inferiorly in a supraperiosteal plane to the level of the left eyebrow, several 3-0 monocryl sutures were used to resuspend the eyebrow orbicularis oculi muscular unit to the superior frontal bone periosteum. This resulted in an appropriate reapproximation of static eyebrow symmetry and correction of the left brow ptosis. Deep Sutures: 4-0 Vicryl Epidermal Sutures: 5-0 Prolene Epidermal Closure: running and interrupted Suturegard Intro: Intraoperative tissue expansion was performed, utilizing the SUTUREGARD device, in order to reduce wound tension. Suturegard Body: The suture ends were repeatedly re-tightened and re-clamped to achieve the desired tissue expansion. Hemigard Intro: Due to skin fragility and wound tension, it was decided to use HEMIGARD adhesive retention suture devices to permit a linear closure. The skin was cleaned and dried for a 6cm distance away from the wound. Excessive hair, if present, was removed to allow for adhesion. Hemigard Postcare Instructions: The HEMIGARD strips are to remain completely dry for at least 5-7 days. Donor Site Anesthesia Type: same as repair anesthesia Epidermal Closure Graft Donor Site (Optional): simple interrupted Graft Donor Site Bandage (Optional-Leave Blank If You Don't Want In Note): Steri-strips and a pressure bandage were applied to the donor site. Closure 2 Information: This tab is for additional flaps and grafts, including complex repair and grafts and complex repair and flaps. You can also specify a different location for the additional defect, if the location is the same you do not need to select a new one. We will insert the automated text for the repair you select below just as we do for solitary flaps and grafts. Please note that at this time if you select a location with a different insurance zone you will need to override the ICD10 and CPT if appropriate. Closure 3 Information: This tab is for additional flaps and grafts above and beyond our usual structured repairs.  Please note if you enter information here it will not currently bill and you will need to add the billing information manually. Wound Care: Petrolatum Dressing: pressure dressing with telfa Dressing (No Sutures): dry sterile dressing Suture Removal: 9 days Unna Boot Text: An Unna boot was placed to help immobilize the limb and facilitate more rapid healing. Home Suture Removal Text: Patient was provided instructions on removing sutures and will remove their sutures at home.  If they have any questions or difficulties they will call the office. Post-Care Instructions: I reviewed with the patient in detail post-care instructions. Patient is not to engage in any heavy lifting, exercise, or swimming for the next 7 days. Should the patient develop any fevers, chills, bleeding, severe pain patient will contact the office immediately. Pain Refusal Text: I offered to prescribe pain medication but the patient refused to take this medication. Mauc Instructions: By selecting yes to the question below the MAUC number will be added into the note.  This will be calculated automatically based on the diagnosis chosen, the size entered, the body zone selected (H,M,L) and the specific indications you chose. You will also have the option to override the Mohs AUC if you disagree with the automatically calculated number and this option is found in the Case Summary tab. Where Do You Want The Question To Include Opioid Counseling Located?: Case Summary Tab Eye Protection Verbiage: Before proceeding with the stage, a plastic scleral shield was inserted. The globe was anesthetized with a few drops of 1% lidocaine with 1:100,000 epinephrine. Then, an appropriate sized scleral shield was chosen and coated with lacrilube ointment. The shield was gently inserted and left in place for the duration of each stage. After the stage was completed, the shield was gently removed. Mohs Method Verbiage: An incision at a 45 degree angle following the standard Mohs approach was done and the specimen was harvested as a microscopic controlled layer. Surgeon/Pathologist Verbiage (Will Incorporate Name Of Surgeon From Intro If Not Blank): operated in two distinct and integrated capacities as the surgeon and pathologist. Mohs Histo Method Verbiage: Each section was then chromacoded and processed in the Mohs lab using the Mohs protocol and submitted for frozen section. Subsequent Stages Histo Method Verbiage: Using a similar technique to that described above, a thin layer of tissue was removed from all areas where tumor was visible on the previous stage.  The tissue was again oriented, mapped, dyed, and processed as above. Mohs Rapid Report Verbiage: The area of clinically evident tumor was marked with skin marking ink and appropriately hatched.  The initial incision was made following the Mohs approach through the skin.  The specimen was taken to the lab, divided into the necessary number of pieces, chromacoded and processed according to the Mohs protocol.  This was repeated in successive stages until a tumor free defect was achieved. Complex Repair Preamble Text (Leave Blank If You Do Not Want): Extensive wide undermining was performed. Intermediate Repair Preamble Text (Leave Blank If You Do Not Want): Undermining was performed with blunt dissection. Non-Graft Cartilage Fenestration Text: The cartilage was fenestrated with a 2mm punch biopsy to help facilitate healing. Graft Cartilage Fenestration Text: The cartilage was fenestrated with a 2mm punch biopsy to help facilitate graft survival and healing. Secondary Intention Text (Leave Blank If You Do Not Want): The defect will heal with secondary intention. No Repair - Repaired With Adjacent Surgical Defect Text (Leave Blank If You Do Not Want): After obtaining clear surgical margins the defect was repaired concurrently with another surgical defect which was in close approximation. Adjacent Tissue Transfer Text: The defect edges were debeveled with a #15 scalpel blade.  Given the location of the defect and the proximity to free margins an adjacent tissue transfer was deemed most appropriate.  Using a sterile surgical marker, an appropriate flap was drawn incorporating the defect and placing the expected incisions within the relaxed skin tension lines where possible.    The area thus outlined was incised deep to adipose tissue with a #15 scalpel blade.  The skin margins were undermined to an appropriate distance in all directions utilizing iris scissors. Advancement Flap (Single) Text: The defect edges were debeveled with a #15 scalpel blade.  Given the location of the defect and the proximity to free margins a single advancement flap was deemed most appropriate.  Using a sterile surgical marker, an appropriate advancement flap was drawn incorporating the defect and placing the expected incisions within the relaxed skin tension lines where possible.    The area thus outlined was incised deep to adipose tissue with a #15 scalpel blade.  The skin margins were undermined to an appropriate distance in all directions utilizing iris scissors. Advancement Flap (Double) Text: The defect edges were debeveled with a #15 scalpel blade.  Given the location of the defect and the proximity to free margins a double advancement flap was deemed most appropriate.  Using a sterile surgical marker, the appropriate advancement flaps were drawn incorporating the defect and placing the expected incisions within the relaxed skin tension lines where possible.    The area thus outlined was incised deep to adipose tissue with a #15 scalpel blade.  The skin margins were undermined to an appropriate distance in all directions utilizing iris scissors. Burow's Advancement Flap Text: The defect edges were debeveled with a #15 scalpel blade.  Given the location of the defect and the proximity to free margins a Burow's advancement flap was deemed most appropriate.  Using a sterile surgical marker, the appropriate advancement flap was drawn incorporating the defect and placing the expected incisions within the relaxed skin tension lines where possible.    The area thus outlined was incised deep to adipose tissue with a #15 scalpel blade.  The skin margins were undermined to an appropriate distance in all directions utilizing iris scissors. Chonodrocutaneous Helical Advancement Flap Text: The defect edges were debeveled with a #15 scalpel blade.  Given the location of the defect and the proximity to free margins a chondrocutaneous helical advancement flap was deemed most appropriate.  Using a sterile surgical marker, the appropriate advancement flap was drawn incorporating the defect and placing the expected incisions within the relaxed skin tension lines where possible.    The area thus outlined was incised deep to adipose tissue with a #15 scalpel blade.  The skin margins were undermined to an appropriate distance in all directions utilizing iris scissors. Crescentic Advancement Flap Text: The defect edges were debeveled with a #15 scalpel blade.  Given the location of the defect and the proximity to free margins a crescentic advancement flap was deemed most appropriate.  Using a sterile surgical marker, the appropriate advancement flap was drawn incorporating the defect and placing the expected incisions within the relaxed skin tension lines where possible.    The area thus outlined was incised deep to adipose tissue with a #15 scalpel blade.  The skin margins were undermined to an appropriate distance in all directions utilizing iris scissors. A-T Advancement Flap Text: The defect edges were debeveled with a #15 scalpel blade.  Given the location of the defect, shape of the defect and the proximity to free margins an A-T advancement flap was deemed most appropriate.  Using a sterile surgical marker, an appropriate advancement flap was drawn incorporating the defect and placing the expected incisions within the relaxed skin tension lines where possible.    The area thus outlined was incised deep to adipose tissue with a #15 scalpel blade.  The skin margins were undermined to an appropriate distance in all directions utilizing iris scissors. O-T Advancement Flap Text: The defect edges were debeveled with a #15 scalpel blade.  Given the location of the defect, shape of the defect and the proximity to free margins an O-T advancement flap was deemed most appropriate.  Using a sterile surgical marker, an appropriate advancement flap was drawn incorporating the defect and placing the expected incisions within the relaxed skin tension lines where possible.    The area thus outlined was incised deep to adipose tissue with a #15 scalpel blade.  The skin margins were undermined to an appropriate distance in all directions utilizing iris scissors. O-L Flap Text: The defect edges were debeveled with a #15 scalpel blade.  Given the location of the defect, shape of the defect and the proximity to free margins an O-L flap was deemed most appropriate.  Using a sterile surgical marker, an appropriate advancement flap was drawn incorporating the defect and placing the expected incisions within the relaxed skin tension lines where possible.    The area thus outlined was incised deep to adipose tissue with a #15 scalpel blade.  The skin margins were undermined to an appropriate distance in all directions utilizing iris scissors. O-Z Flap Text: The defect edges were debeveled with a #15 scalpel blade.  Given the location of the defect, shape of the defect and the proximity to free margins an O-Z flap was deemed most appropriate.  Using a sterile surgical marker, an appropriate transposition flap was drawn incorporating the defect and placing the expected incisions within the relaxed skin tension lines where possible. The area thus outlined was incised deep to adipose tissue with a #15 scalpel blade.  The skin margins were undermined to an appropriate distance in all directions utilizing iris scissors. Double O-Z Flap Text: The defect edges were debeveled with a #15 scalpel blade.  Given the location of the defect, shape of the defect and the proximity to free margins a Double O-Z flap was deemed most appropriate.  Using a sterile surgical marker, an appropriate transposition flap was drawn incorporating the defect and placing the expected incisions within the relaxed skin tension lines where possible. The area thus outlined was incised deep to adipose tissue with a #15 scalpel blade.  The skin margins were undermined to an appropriate distance in all directions utilizing iris scissors. V-Y Flap Text: The defect edges were debeveled with a #15 scalpel blade.  Given the location of the defect, shape of the defect and the proximity to free margins a V-Y flap was deemed most appropriate.  Using a sterile surgical marker, an appropriate advancement flap was drawn incorporating the defect and placing the expected incisions within the relaxed skin tension lines where possible.    The area thus outlined was incised deep to adipose tissue with a #15 scalpel blade.  The skin margins were undermined to an appropriate distance in all directions utilizing iris scissors. Advancement-Rotation Flap Text: The defect edges were debeveled with a #15 scalpel blade.  Given the location of the defect, shape of the defect and the proximity to free margins an advancement-rotation flap was deemed most appropriate.  Using a sterile surgical marker, an appropriate flap was drawn incorporating the defect and placing the expected incisions within the relaxed skin tension lines where possible. The area thus outlined was incised deep to adipose tissue with a #15 scalpel blade.  The skin margins were undermined to an appropriate distance in all directions utilizing iris scissors. Mercedes Flap Text: The defect edges were debeveled with a #15 scalpel blade.  Given the location of the defect, shape of the defect and the proximity to free margins a Mercedes flap was deemed most appropriate.  Using a sterile surgical marker, an appropriate advancement flap was drawn incorporating the defect and placing the expected incisions within the relaxed skin tension lines where possible. The area thus outlined was incised deep to adipose tissue with a #15 scalpel blade.  The skin margins were undermined to an appropriate distance in all directions utilizing iris scissors. Modified Advancement Flap Text: The defect edges were debeveled with a #15 scalpel blade.  Given the location of the defect, shape of the defect and the proximity to free margins a modified advancement flap was deemed most appropriate.  Using a sterile surgical marker, an appropriate advancement flap was drawn incorporating the defect and placing the expected incisions within the relaxed skin tension lines where possible.    The area thus outlined was incised deep to adipose tissue with a #15 scalpel blade.  The skin margins were undermined to an appropriate distance in all directions utilizing iris scissors. Mucosal Advancement Flap Text: Given the location of the defect, shape of the defect and the proximity to free margins a mucosal advancement flap was deemed most appropriate. Incisions were made with a 15 blade scalpel in the appropriate fashion along the cutaneous vermilion border and the mucosal lip. The remaining actinically damaged mucosal tissue was excised.  The mucosal advancement flap was then elevated to the gingival sulcus with care taken to preserve the neurovascular structures and advanced into the primary defect. Care was taken to ensure that precise realignment of the vermilion border was achieved. Peng Advancement Flap Text: The defect edges were debeveled with a #15 scalpel blade.  Given the location of the defect, shape of the defect and the proximity to free margins a Peng advancement flap was deemed most appropriate.  Using a sterile surgical marker, an appropriate advancement flap was drawn incorporating the defect and placing the expected incisions within the relaxed skin tension lines where possible. The area thus outlined was incised deep to adipose tissue with a #15 scalpel blade.  The skin margins were undermined to an appropriate distance in all directions utilizing iris scissors. Hatchet Flap Text: The defect edges were debeveled with a #15 scalpel blade.  Given the location of the defect, shape of the defect and the proximity to free margins a hatchet flap was deemed most appropriate.  Using a sterile surgical marker, an appropriate hatchet flap was drawn incorporating the defect and placing the expected incisions within the relaxed skin tension lines where possible.    The area thus outlined was incised deep to adipose tissue with a #15 scalpel blade.  The skin margins were undermined to an appropriate distance in all directions utilizing iris scissors. Rotation Flap Text: The defect edges were debeveled with a #15 scalpel blade.  Given the location of the defect, shape of the defect and the proximity to free margins a rotation flap was deemed most appropriate.  Using a sterile surgical marker, an appropriate rotation flap was drawn incorporating the defect and placing the expected incisions within the relaxed skin tension lines where possible.    The area thus outlined was incised deep to adipose tissue with a #15 scalpel blade.  The skin margins were undermined to an appropriate distance in all directions utilizing iris scissors. Bilateral Rotation Flap Text: The defect edges were debeveled with a #15 scalpel blade. Given the location of the defect, shape of the defect and the proximity to free margins a bilateral rotation flap was deemed most appropriate. Using a sterile surgical marker, an appropriate rotation flap was drawn incorporating the defect and placing the expected incisions within the relaxed skin tension lines where possible. The area thus outlined was incised deep to adipose tissue with a #15 scalpel blade. The skin margins were undermined to an appropriate distance in all directions utilizing iris scissors. Following this, the designed flap was carried over into the primary defect and sutured into place. Spiral Flap Text: The defect edges were debeveled with a #15 scalpel blade.  Given the location of the defect, shape of the defect and the proximity to free margins a spiral flap was deemed most appropriate.  Using a sterile surgical marker, an appropriate rotation flap was drawn incorporating the defect and placing the expected incisions within the relaxed skin tension lines where possible. The area thus outlined was incised deep to adipose tissue with a #15 scalpel blade.  The skin margins were undermined to an appropriate distance in all directions utilizing iris scissors. Staged Advancement Flap Text: The defect edges were debeveled with a #15 scalpel blade.  Given the location of the defect, shape of the defect and the proximity to free margins a staged advancement flap was deemed most appropriate.  Using a sterile surgical marker, an appropriate advancement flap was drawn incorporating the defect and placing the expected incisions within the relaxed skin tension lines where possible. The area thus outlined was incised deep to adipose tissue with a #15 scalpel blade.  The skin margins were undermined to an appropriate distance in all directions utilizing iris scissors. Star Wedge Flap Text: The defect edges were debeveled with a #15 scalpel blade.  Given the location of the defect, shape of the defect and the proximity to free margins a star wedge flap was deemed most appropriate.  Using a sterile surgical marker, an appropriate rotation flap was drawn incorporating the defect and placing the expected incisions within the relaxed skin tension lines where possible. The area thus outlined was incised deep to adipose tissue with a #15 scalpel blade.  The skin margins were undermined to an appropriate distance in all directions utilizing iris scissors. Transposition Flap Text: The defect edges were debeveled with a #15 scalpel blade.  Given the location of the defect and the proximity to free margins a transposition flap was deemed most appropriate.  Using a sterile surgical marker, an appropriate transposition flap was drawn incorporating the defect.    The area thus outlined was incised deep to adipose tissue with a #15 scalpel blade.  The skin margins were undermined to an appropriate distance in all directions utilizing iris scissors. Muscle Hinge Flap Text: The defect edges were debeveled with a #15 scalpel blade.  Given the size, depth and location of the defect and the proximity to free margins a muscle hinge flap was deemed most appropriate.  Using a sterile surgical marker, an appropriate hinge flap was drawn incorporating the defect. The area thus outlined was incised with a #15 scalpel blade.  The skin margins were undermined to an appropriate distance in all directions utilizing iris scissors. Mustarde Flap Text: The defect edges were debeveled with a #15 scalpel blade.  Given the size, depth and location of the defect and the proximity to free margins a Mustarde flap was deemed most appropriate.  Using a sterile surgical marker, an appropriate flap was drawn incorporating the defect. The area thus outlined was incised with a #15 scalpel blade.  The skin margins were undermined to an appropriate distance in all directions utilizing iris scissors. Nasal Turnover Hinge Flap Text: The defect edges were debeveled with a #15 scalpel blade.  Given the size, depth, location of the defect and the defect being full thickness a nasal turnover hinge flap was deemed most appropriate.  Using a sterile surgical marker, an appropriate hinge flap was drawn incorporating the defect. The area thus outlined was incised with a #15 scalpel blade. The flap was designed to recreate the nasal mucosal lining and the alar rim. The skin margins were undermined to an appropriate distance in all directions utilizing iris scissors. Nasalis-Muscle-Based Myocutaneous Island Pedicle Flap Text: Using a #15 blade, an incision was made around the donor flap to the level of the nasalis muscle. Wide lateral undermining was then performed in both the subcutaneous plane above the nasalis muscle, and in a submuscular plane just above periosteum. This allowed the formation of a free nasalis muscle axial pedicle (based on the angular artery) which was still attached to the actual cutaneous flap, increasing its mobility and vascular viability. Hemostasis was obtained with pinpoint electrocoagulation. The flap was mobilized into position and the pivotal anchor points positioned and stabilized with buried interrupted sutures. Subcutaneous and dermal tissues were closed in a multilayered fashion with sutures. Tissue redundancies were excised, and the epidermal edges were apposed without significant tension and sutured with sutures. Nasalis Myocutaneous Flap Text: Using a #15 blade, an incision was made around the donor flap to the level of the nasalis muscle. Wide lateral undermining was then performed in both the subcutaneous plane above the nasalis muscle, and in a submuscular plane just above periosteum. This allowed the formation of a free nasalis muscle axial pedicle which was still attached to the actual cutaneous flap, increasing its mobility and vascular viability. Hemostasis was obtained with pinpoint electrocoagulation. The flap was mobilized into position and the pivotal anchor points positioned and stabilized with buried interrupted sutures. Subcutaneous and dermal tissues were closed in a multilayered fashion with sutures. Tissue redundancies were excised, and the epidermal edges were apposed without significant tension and sutured with sutures. Nasolabial Transposition Flap Text: The defect edges were debeveled with a #15 scalpel blade.  Given the size, depth and location of the defect and the proximity to free margins a nasolabial transposition flap was deemed most appropriate. Using a sterile surgical marker, an appropriate flap was drawn incorporating the defect. The area thus outlined was incised with a #15 scalpel blade. The skin margins were undermined to an appropriate distance in all directions utilizing iris scissors. Following this, the designed flap was carried into the primary defect and sutured into place. Orbicularis Oris Muscle Flap Text: The defect edges were debeveled with a #15 scalpel blade.  Given that the defect affected the competency of the oral sphincter an orbicularis oris muscle flap was deemed most appropriate to restore this competency and normal muscle function.  Using a sterile surgical marker, an appropriate flap was drawn incorporating the defect. The area thus outlined was incised with a #15 scalpel blade. Melolabial Transposition Flap Text: The defect edges were debeveled with a #15 scalpel blade.  Given the location of the defect and the proximity to free margins a melolabial flap was deemed most appropriate.  Using a sterile surgical marker, an appropriate melolabial transposition flap was drawn incorporating the defect.    The area thus outlined was incised deep to adipose tissue with a #15 scalpel blade.  The skin margins were undermined to an appropriate distance in all directions utilizing iris scissors. Rectangular Flap Text: The defect edges were debeveled with a #15 scalpel blade. Given the location of the defect and the proximity to free margins a rectangular flap was deemed most appropriate. Using a sterile surgical marker, an appropriate rectangular flap was drawn incorporating the defect. The area thus outlined was incised deep to adipose tissue with a #15 scalpel blade. The skin margins were undermined to an appropriate distance in all directions utilizing iris scissors. Following this, the designed flap was carried over into the primary defect and sutured into place. Rhombic Flap Text: The defect edges were debeveled with a #15 scalpel blade.  Given the location of the defect and the proximity to free margins a rhombic flap was deemed most appropriate.  Using a sterile surgical marker, an appropriate rhombic flap was drawn incorporating the defect.    The area thus outlined was incised deep to adipose tissue with a #15 scalpel blade.  The skin margins were undermined to an appropriate distance in all directions utilizing iris scissors. Rhomboid Transposition Flap Text: The defect edges were debeveled with a #15 scalpel blade.  Given the location of the defect and the proximity to free margins a rhomboid transposition flap was deemed most appropriate.  Using a sterile surgical marker, an appropriate rhomboid flap was drawn incorporating the defect.    The area thus outlined was incised deep to adipose tissue with a #15 scalpel blade.  The skin margins were undermined to an appropriate distance in all directions utilizing iris scissors. Bi-Rhombic Flap Text: The defect edges were debeveled with a #15 scalpel blade.  Given the location of the defect and the proximity to free margins a bi-rhombic flap was deemed most appropriate.  Using a sterile surgical marker, an appropriate rhombic flap was drawn incorporating the defect. The area thus outlined was incised deep to adipose tissue with a #15 scalpel blade.  The skin margins were undermined to an appropriate distance in all directions utilizing iris scissors. Helical Rim Advancement Flap Text: The defect edges were debeveled with a #15 blade scalpel.  Given the location of the defect and the proximity to free margins (helical rim) a double helical rim advancement flap was deemed most appropriate.  Using a sterile surgical marker, the appropriate advancement flaps were drawn incorporating the defect and placing the expected incisions between the helical rim and antihelix where possible.  The area thus outlined was incised through and through with a #15 scalpel blade.  With a skin hook and iris scissors, the flaps were gently and sharply undermined and freed up. Bilateral Helical Rim Advancement Flap Text: The defect edges were debeveled with a #15 blade scalpel.  Given the location of the defect and the proximity to free margins (helical rim) a bilateral helical rim advancement flap was deemed most appropriate.  Using a sterile surgical marker, the appropriate advancement flaps were drawn incorporating the defect and placing the expected incisions between the helical rim and antihelix where possible.  The area thus outlined was incised through and through with a #15 scalpel blade.  With a skin hook and iris scissors, the flaps were gently and sharply undermined and freed up. Ear Star Wedge Flap Text: The defect edges were debeveled with a #15 blade scalpel.  Given the location of the defect and the proximity to free margins (helical rim) an ear star wedge flap was deemed most appropriate.  Using a sterile surgical marker, the appropriate flap was drawn incorporating the defect and placing the expected incisions between the helical rim and antihelix where possible.  The area thus outlined was incised through and through with a #15 scalpel blade. Banner Transposition Flap Text: The defect edges were debeveled with a #15 scalpel blade.  Given the location of the defect and the proximity to free margins a Banner transposition flap was deemed most appropriate.  Using a sterile surgical marker, an appropriate flap drawn around the defect. The area thus outlined was incised deep to adipose tissue with a #15 scalpel blade.  The skin margins were undermined to an appropriate distance in all directions utilizing iris scissors. Bilobed Flap Text: The defect edges were debeveled with a #15 scalpel blade.  Given the location of the defect and the proximity to free margins a bilobe flap was deemed most appropriate.  Using a sterile surgical marker, an appropriate bilobe flap drawn around the defect.    The area thus outlined was incised deep to adipose tissue with a #15 scalpel blade.  The skin margins were undermined to an appropriate distance in all directions utilizing iris scissors. Bilobed Transposition Flap Text: The defect edges were debeveled with a #15 scalpel blade.  Given the location of the defect and the proximity to free margins a bilobed transposition flap was deemed most appropriate.  Using a sterile surgical marker, an appropriate bilobe flap drawn around the defect.    The area thus outlined was incised deep to adipose tissue with a #15 scalpel blade.  The skin margins were undermined to an appropriate distance in all directions utilizing iris scissors. Trilobed Flap Text: The defect edges were debeveled with a #15 scalpel blade.  Given the location of the defect and the proximity to free margins a trilobed flap was deemed most appropriate.  Using a sterile surgical marker, an appropriate trilobed flap drawn around the defect.    The area thus outlined was incised deep to adipose tissue with a #15 scalpel blade.  The skin margins were undermined to an appropriate distance in all directions utilizing iris scissors. Dorsal Nasal Flap Text: The defect edges were debeveled with a #15 scalpel blade.  Given the location of the defect and the proximity to free margins a dorsal nasal flap was deemed most appropriate.  Using a sterile surgical marker, an appropriate dorsal nasal flap was drawn around the defect.    The area thus outlined was incised deep to adipose tissue with a #15 scalpel blade.  The skin margins were undermined to an appropriate distance in all directions utilizing iris scissors. Island Pedicle Flap Text: The defect edges were debeveled with a #15 scalpel blade.  Given the location of the defect, shape of the defect and the proximity to free margins an island pedicle advancement flap was deemed most appropriate.  Using a sterile surgical marker, an appropriate advancement flap was drawn incorporating the defect, outlining the appropriate donor tissue and placing the expected incisions within the relaxed skin tension lines where possible.    The area thus outlined was incised deep to adipose tissue with a #15 scalpel blade.  The skin margins were undermined to an appropriate distance in all directions around the primary defect and laterally outward around the island pedicle utilizing iris scissors.  There was minimal undermining beneath the pedicle flap. Island Pedicle Flap With Canthal Suspension Text: The defect edges were debeveled with a #15 scalpel blade.  Given the location of the defect, shape of the defect and the proximity to free margins an island pedicle advancement flap was deemed most appropriate.  Using a sterile surgical marker, an appropriate advancement flap was drawn incorporating the defect, outlining the appropriate donor tissue and placing the expected incisions within the relaxed skin tension lines where possible. The area thus outlined was incised deep to adipose tissue with a #15 scalpel blade.  The skin margins were undermined to an appropriate distance in all directions around the primary defect and laterally outward around the island pedicle utilizing iris scissors.  There was minimal undermining beneath the pedicle flap. A suspension suture was placed in the canthal tendon to prevent tension and prevent ectropion. Alar Island Pedicle Flap Text: The defect edges were debeveled with a #15 scalpel blade.  Given the location of the defect, shape of the defect and the proximity to the alar rim an island pedicle advancement flap was deemed most appropriate.  Using a sterile surgical marker, an appropriate advancement flap was drawn incorporating the defect, outlining the appropriate donor tissue and placing the expected incisions within the nasal ala running parallel to the alar rim. The area thus outlined was incised with a #15 scalpel blade.  The skin margins were undermined minimally to an appropriate distance in all directions around the primary defect and laterally outward around the island pedicle utilizing iris scissors.  There was minimal undermining beneath the pedicle flap. Double Island Pedicle Flap Text: The defect edges were debeveled with a #15 scalpel blade.  Given the location of the defect, shape of the defect and the proximity to free margins a double island pedicle advancement flap was deemed most appropriate.  Using a sterile surgical marker, an appropriate advancement flap was drawn incorporating the defect, outlining the appropriate donor tissue and placing the expected incisions within the relaxed skin tension lines where possible.    The area thus outlined was incised deep to adipose tissue with a #15 scalpel blade.  The skin margins were undermined to an appropriate distance in all directions around the primary defect and laterally outward around the island pedicle utilizing iris scissors.  There was minimal undermining beneath the pedicle flap. Island Pedicle Flap-Requiring Vessel Identification Text: The defect edges were debeveled with a #15 scalpel blade.  Given the location of the defect, shape of the defect and the proximity to free margins an island pedicle advancement flap was deemed most appropriate.  Using a sterile surgical marker, an appropriate advancement flap was drawn, based on the axial vessel mentioned above, incorporating the defect, outlining the appropriate donor tissue and placing the expected incisions within the relaxed skin tension lines where possible.    The area thus outlined was incised deep to adipose tissue with a #15 scalpel blade.  The skin margins were undermined to an appropriate distance in all directions around the primary defect and laterally outward around the island pedicle utilizing iris scissors.  There was minimal undermining beneath the pedicle flap. Keystone Flap Text: The defect edges were debeveled with a #15 scalpel blade.  Given the location of the defect, shape of the defect a keystone flap was deemed most appropriate.  Using a sterile surgical marker, an appropriate keystone flap was drawn incorporating the defect, outlining the appropriate donor tissue and placing the expected incisions within the relaxed skin tension lines where possible. The area thus outlined was incised deep to adipose tissue with a #15 scalpel blade.  The skin margins were undermined to an appropriate distance in all directions around the primary defect and laterally outward around the flap utilizing iris scissors. O-T Plasty Text: The defect edges were debeveled with a #15 scalpel blade.  Given the location of the defect, shape of the defect and the proximity to free margins an O-T plasty was deemed most appropriate.  Using a sterile surgical marker, an appropriate O-T plasty was drawn incorporating the defect and placing the expected incisions within the relaxed skin tension lines where possible.    The area thus outlined was incised deep to adipose tissue with a #15 scalpel blade.  The skin margins were undermined to an appropriate distance in all directions utilizing iris scissors. O-Z Plasty Text: The defect edges were debeveled with a #15 scalpel blade.  Given the location of the defect, shape of the defect and the proximity to free margins an O-Z plasty (double transposition flap) was deemed most appropriate.  Using a sterile surgical marker, the appropriate transposition flaps were drawn incorporating the defect and placing the expected incisions within the relaxed skin tension lines where possible.    The area thus outlined was incised deep to adipose tissue with a #15 scalpel blade.  The skin margins were undermined to an appropriate distance in all directions utilizing iris scissors.  Hemostasis was achieved with electrocautery.  The flaps were then transposed into place, one clockwise and the other counterclockwise, and anchored with interrupted buried subcutaneous sutures. Double O-Z Plasty Text: The defect edges were debeveled with a #15 scalpel blade.  Given the location of the defect, shape of the defect and the proximity to free margins a Double O-Z plasty (double transposition flap) was deemed most appropriate.  Using a sterile surgical marker, the appropriate transposition flaps were drawn incorporating the defect and placing the expected incisions within the relaxed skin tension lines where possible. The area thus outlined was incised deep to adipose tissue with a #15 scalpel blade.  The skin margins were undermined to an appropriate distance in all directions utilizing iris scissors.  Hemostasis was achieved with electrocautery.  The flaps were then transposed into place, one clockwise and the other counterclockwise, and anchored with interrupted buried subcutaneous sutures. V-Y Plasty Text: The defect edges were debeveled with a #15 scalpel blade.  Given the location of the defect, shape of the defect and the proximity to free margins an V-Y advancement flap was deemed most appropriate.  Using a sterile surgical marker, an appropriate advancement flap was drawn incorporating the defect and placing the expected incisions within the relaxed skin tension lines where possible.    The area thus outlined was incised deep to adipose tissue with a #15 scalpel blade.  The skin margins were undermined to an appropriate distance in all directions utilizing iris scissors. H Plasty Text: Given the location of the defect, shape of the defect and the proximity to free margins a H-plasty was deemed most appropriate for repair.  Using a sterile surgical marker, the appropriate advancement arms of the H-plasty were drawn incorporating the defect and placing the expected incisions within the relaxed skin tension lines where possible. The area thus outlined was incised deep to adipose tissue with a #15 scalpel blade. The skin margins were undermined to an appropriate distance in all directions utilizing iris scissors.  The opposing advancement arms were then advanced into place in opposite direction and anchored with interrupted buried subcutaneous sutures. W Plasty Text: The lesion was extirpated to the level of the fat with a #15 scalpel blade.  Given the location of the defect, shape of the defect and the proximity to free margins a W-plasty was deemed most appropriate for repair.  Using a sterile surgical marker, the appropriate transposition arms of the W-plasty were drawn incorporating the defect and placing the expected incisions within the relaxed skin tension lines where possible.    The area thus outlined was incised deep to adipose tissue with a #15 scalpel blade.  The skin margins were undermined to an appropriate distance in all directions utilizing iris scissors.  The opposing transposition arms were then transposed into place in opposite direction and anchored with interrupted buried subcutaneous sutures. Z Plasty Text: The lesion was extirpated to the level of the fat with a #15 scalpel blade.  Given the location of the defect, shape of the defect and the proximity to free margins a Z-plasty was deemed most appropriate for repair.  Using a sterile surgical marker, the appropriate transposition arms of the Z-plasty were drawn incorporating the defect and placing the expected incisions within the relaxed skin tension lines where possible.    The area thus outlined was incised deep to adipose tissue with a #15 scalpel blade.  The skin margins were undermined to an appropriate distance in all directions utilizing iris scissors.  The opposing transposition arms were then transposed into place in opposite direction and anchored with interrupted buried subcutaneous sutures. Double Z Plasty Text: The lesion was extirpated to the level of the fat with a #15 scalpel blade. Given the location of the defect, shape of the defect and the proximity to free margins a double Z-plasty was deemed most appropriate for repair. Using a sterile surgical marker, the appropriate transposition arms of the double Z-plasty were drawn incorporating the defect and placing the expected incisions within the relaxed skin tension lines where possible. The area thus outlined was incised deep to adipose tissue with a #15 scalpel blade. The skin margins were undermined to an appropriate distance in all directions utilizing iris scissors. The opposing transposition arms were then transposed and carried over into place in opposite direction and anchored with interrupted buried subcutaneous sutures. Zygomaticofacial Flap Text: Given the location of the defect, shape of the defect and the proximity to free margins a zygomaticofacial flap was deemed most appropriate for repair.  Using a sterile surgical marker, the appropriate flap was drawn incorporating the defect and placing the expected incisions within the relaxed skin tension lines where possible. The area thus outlined was incised deep to adipose tissue with a #15 scalpel blade with preservation of a vascular pedicle.  The skin margins were undermined to an appropriate distance in all directions utilizing iris scissors.  The flap was then placed into the defect and anchored with interrupted buried subcutaneous sutures. Cheek Interpolation Flap Text: A decision was made to reconstruct the defect utilizing an interpolation axial flap and a staged reconstruction.  A telfa template was made of the defect.  This telfa template was then used to outline the Cheek Interpolation flap.  The donor area for the pedicle flap was then injected with anesthesia.  The flap was excised through the skin and subcutaneous tissue down to the layer of the underlying musculature.  The interpolation flap was carefully excised within this deep plane to maintain its blood supply.  The edges of the donor site were undermined.   The donor site was closed in a primary fashion.  The pedicle was then rotated into position and sutured.  Once the tube was sutured into place, adequate blood supply was confirmed with blanching and refill.  The pedicle was then wrapped with xeroform gauze and dressed appropriately with a telfa and gauze bandage to ensure continued blood supply and protect the attached pedicle. Cheek-To-Nose Interpolation Flap Text: A decision was made to reconstruct the defect utilizing an interpolation axial flap and a staged reconstruction.  A telfa template was made of the defect.  This telfa template was then used to outline the Cheek-To-Nose Interpolation flap.  The donor area for the pedicle flap was then injected with anesthesia.  The flap was excised through the skin and subcutaneous tissue down to the layer of the underlying musculature.  The interpolation flap was carefully excised within this deep plane to maintain its blood supply.  The edges of the donor site were undermined.   The donor site was closed in a primary fashion.  The pedicle was then rotated into position and sutured.  Once the tube was sutured into place, adequate blood supply was confirmed with blanching and refill.  The pedicle was then wrapped with xeroform gauze and dressed appropriately with a telfa and gauze bandage to ensure continued blood supply and protect the attached pedicle. Interpolation Flap Text: A decision was made to reconstruct the defect utilizing an interpolation axial flap and a staged reconstruction.  A telfa template was made of the defect.  This telfa template was then used to outline the interpolation flap.  The donor area for the pedicle flap was then injected with anesthesia.  The flap was excised through the skin and subcutaneous tissue down to the layer of the underlying musculature.  The interpolation flap was carefully excised within this deep plane to maintain its blood supply.  The edges of the donor site were undermined.   The donor site was closed in a primary fashion.  The pedicle was then rotated into position and sutured.  Once the tube was sutured into place, adequate blood supply was confirmed with blanching and refill.  The pedicle was then wrapped with xeroform gauze and dressed appropriately with a telfa and gauze bandage to ensure continued blood supply and protect the attached pedicle. Melolabial Interpolation Flap Text: A decision was made to reconstruct the defect utilizing an interpolation axial flap and a staged reconstruction.  A telfa template was made of the defect.  This telfa template was then used to outline the melolabial interpolation flap.  The donor area for the pedicle flap was then injected with anesthesia.  The flap was excised through the skin and subcutaneous tissue down to the layer of the underlying musculature.  The pedicle flap was carefully excised within this deep plane to maintain its blood supply.  The edges of the donor site were undermined.   The donor site was closed in a primary fashion.  The pedicle was then rotated into position and sutured.  Once the tube was sutured into place, adequate blood supply was confirmed with blanching and refill.  The pedicle was then wrapped with xeroform gauze and dressed appropriately with a telfa and gauze bandage to ensure continued blood supply and protect the attached pedicle. Mastoid Interpolation Flap Text: A decision was made to reconstruct the defect utilizing an interpolation axial flap and a staged reconstruction.  A telfa template was made of the defect.  This telfa template was then used to outline the mastoid interpolation flap.  The donor area for the pedicle flap was then injected with anesthesia.  The flap was excised through the skin and subcutaneous tissue down to the layer of the underlying musculature.  The pedicle flap was carefully excised within this deep plane to maintain its blood supply.  The edges of the donor site were undermined.   The donor site was closed in a primary fashion.  The pedicle was then rotated into position and sutured.  Once the tube was sutured into place, adequate blood supply was confirmed with blanching and refill.  The pedicle was then wrapped with xeroform gauze and dressed appropriately with a telfa and gauze bandage to ensure continued blood supply and protect the attached pedicle. Posterior Auricular Interpolation Flap Text: A decision was made to reconstruct the defect utilizing an interpolation axial flap and a staged reconstruction.  A telfa template was made of the defect.  This telfa template was then used to outline the posterior auricular interpolation flap.  The donor area for the pedicle flap was then injected with anesthesia.  The flap was excised through the skin and subcutaneous tissue down to the layer of the underlying musculature.  The pedicle flap was carefully excised within this deep plane to maintain its blood supply.  The edges of the donor site were undermined.   The donor site was closed in a primary fashion.  The pedicle was then rotated into position and sutured.  Once the tube was sutured into place, adequate blood supply was confirmed with blanching and refill.  The pedicle was then wrapped with xeroform gauze and dressed appropriately with a telfa and gauze bandage to ensure continued blood supply and protect the attached pedicle. Paramedian Forehead Flap Text: A decision was made to reconstruct the defect utilizing an interpolation axial flap and a staged reconstruction.  A telfa template was made of the defect.  This telfa template was then used to outline the paramedian forehead pedicle flap.  The donor area for the pedicle flap was then injected with anesthesia.  The flap was excised through the skin and subcutaneous tissue down to the layer of the underlying musculature.  The pedicle flap was carefully excised within this deep plane to maintain its blood supply.  The edges of the donor site were undermined.   The donor site was closed in a primary fashion.  The pedicle was then rotated into position and sutured.  Once the tube was sutured into place, adequate blood supply was confirmed with blanching and refill.  The pedicle was then wrapped with xeroform gauze and dressed appropriately with a telfa and gauze bandage to ensure continued blood supply and protect the attached pedicle. Abbe Flap (Upper To Lower Lip) Text: The defect of the lower lip was assessed and measured.  Given the location and size of the defect, an Abbe flap was deemed most appropriate.  Using a sterile surgical marker, an appropriate Abbe flap was measured and drawn on the upper lip. Local anesthesia was then infiltrated.  A scalpel was then used to incise the upper lip through and through the skin, vermilion, muscle and mucosa, leaving the flap pedicled on the opposite side.  The flap was then rotated and transferred to the lower lip defect.  The flap was then sutured into place with a three layer technique, closing the orbicularis oris muscle layer with subcutaneous buried sutures, followed by a mucosal layer and an epidermal layer. Abbe Flap (Lower To Upper Lip) Text: The defect of the upper lip was assessed and measured.  Given the location and size of the defect, an Abbe flap was deemed most appropriate.  Using a sterile surgical marker, an appropriate Abbe flap was measured and drawn on the lower lip. Local anesthesia was then infiltrated. A scalpel was then used to incise the upper lip through and through the skin, vermilion, muscle and mucosa, leaving the flap pedicled on the opposite side.  The flap was then rotated and transferred to the lower lip defect.  The flap was then sutured into place with a three layer technique, closing the orbicularis oris muscle layer with subcutaneous buried sutures, followed by a mucosal layer and an epidermal layer. Estlander Flap (Upper To Lower Lip) Text: The defect of the lower lip was assessed and measured.  Given the location and size of the defect, an Estlander flap was deemed most appropriate.  Using a sterile surgical marker, an appropriate Estlander flap was measured and drawn on the upper lip. Local anesthesia was then infiltrated. A scalpel was then used to incise the lateral aspect of the flap, through skin, muscle and mucosa, leaving the flap pedicled medially.  The flap was then rotated and positioned to fill the lower lip defect.  The flap was then sutured into place with a three layer technique, closing the orbicularis oris muscle layer with subcutaneous buried sutures, followed by a mucosal layer and an epidermal layer. Cheiloplasty (Less Than 50%) Text: A decision was made to reconstruct the defect with a  cheiloplasty.  The defect was undermined extensively.  Additional orbicularis oris muscle was excised with a 15 blade scalpel.  The defect was converted into a full thickness wedge, of less than 50% of the vertical height of the lip, to facilite a better cosmetic result.  Small vessels were then tied off with 5-0 monocyrl. The orbicularis oris, superficial fascia, adipose and dermis were then reapproximated.  After the deeper layers were approximated the epidermis was reapproximated with particular care given to realign the vermilion border. Cheiloplasty (Complex) Text: A decision was made to reconstruct the defect with a  cheiloplasty.  The defect was undermined extensively.  Additional orbicularis oris muscle was excised with a 15 blade scalpel.  The defect was converted into a full thickness wedge to facilite a better cosmetic result.  Small vessels were then tied off with 5-0 monocyrl. The orbicularis oris, superficial fascia, adipose and dermis were then reapproximated.  After the deeper layers were approximated the epidermis was reapproximated with particular care given to realign the vermilion border. Ear Wedge Repair Text: A wedge excision was completed by carrying down an excision through the full thickness of the ear and cartilage with an inward facing Burow's triangle. The wound was then closed in a layered fashion. Full Thickness Lip Wedge Repair (Flap) Text: Given the location of the defect and the proximity to free margins a full thickness wedge repair was deemed most appropriate.  Using a sterile surgical marker, the appropriate repair was drawn incorporating the defect and placing the expected incisions perpendicular to the vermilion border.  The vermilion border was also meticulously outlined to ensure appropriate reapproximation during the repair.  The area thus outlined was incised through and through with a #15 scalpel blade.  The muscularis and dermis were reaproximated with deep sutures following hemostasis. Care was taken to realign the vermilion border before proceeding with the superficial closure.  Once the vermilion was realigned the superfical and mucosal closure was finished. Ftsg Text: The defect edges were debeveled with a #15 scalpel blade.  Given the location of the defect, shape of the defect and the proximity to free margins a full thickness skin graft was deemed most appropriate.  Using a sterile surgical marker, the primary defect shape was transferred to the donor site. The area thus outlined was incised deep to adipose tissue with a #15 scalpel blade.  The harvested graft was then trimmed of adipose tissue until only dermis and epidermis was left.  The skin margins of the secondary defect were undermined to an appropriate distance in all directions utilizing iris scissors.  The secondary defect was closed with interrupted buried subcutaneous sutures.  The skin edges were then re-apposed with running  sutures.  The skin graft was then placed in the primary defect and oriented appropriately. Split-Thickness Skin Graft Text: The defect edges were debeveled with a #15 scalpel blade.  Given the location of the defect, shape of the defect and the proximity to free margins a split thickness skin graft was deemed most appropriate.  Using a sterile surgical marker, the primary defect shape was transferred to the donor site. The split thickness graft was then harvested.  The skin graft was then placed in the primary defect and oriented appropriately. Pinch Graft Text: The defect edges were debeveled with a #15 scalpel blade. Given the location of the defect, shape of the defect and the proximity to free margins a pinch graft was deemed most appropriate. Using a sterile surgical marker, the primary defect shape was transferred to the donor site. The area thus outlined was incised deep to adipose tissue with a #15 scalpel blade.  The harvested graft was then trimmed of adipose tissue until only dermis and epidermis was left. The skin margins of the secondary defect were undermined to an appropriate distance in all directions utilizing iris scissors.  The secondary defect was closed with interrupted buried subcutaneous sutures.  The skin edges were then re-apposed with running  sutures.  The skin graft was then placed in the primary defect and oriented appropriately. Burow's Graft Text: The defect edges were debeveled with a #15 scalpel blade.  Given the location of the defect, shape of the defect, the proximity to free margins and the presence of a standing cone deformity a Burow's skin graft was deemed most appropriate. The standing cone was removed and this tissue was then trimmed to the shape of the primary defect. The adipose tissue was also removed until only dermis and epidermis were left.  The skin margins of the secondary defect were undermined to an appropriate distance in all directions utilizing iris scissors.  The secondary defect was closed with interrupted buried subcutaneous sutures.  The skin edges were then re-apposed with running  sutures.  The skin graft was then placed in the primary defect and oriented appropriately. Cartilage Graft Text: The defect edges were debeveled with a #15 scalpel blade.  Given the location of the defect, shape of the defect, the fact the defect involved a full thickness cartilage defect a cartilage graft was deemed most appropriate.  An appropriate donor site was identified, cleansed, and anesthetized. The cartilage graft was then harvested and transferred to the recipient site, oriented appropriately and then sutured into place.  The secondary defect was then repaired using a primary closure. Composite Graft Text: The defect edges were debeveled with a #15 scalpel blade.  Given the location of the defect, shape of the defect, the proximity to free margins and the fact the defect was full thickness a composite graft was deemed most appropriate.  The defect was outline and then transferred to the donor site.  A full thickness graft was then excised from the donor site. The graft was then placed in the primary defect, oriented appropriately and then sutured into place.  The secondary defect was then repaired using a primary closure. Epidermal Autograft Text: The defect edges were debeveled with a #15 scalpel blade.  Given the location of the defect, shape of the defect and the proximity to free margins an epidermal autograft was deemed most appropriate.  Using a sterile surgical marker, the primary defect shape was transferred to the donor site. The epidermal graft was then harvested.  The skin graft was then placed in the primary defect and oriented appropriately. Dermal Autograft Text: The defect edges were debeveled with a #15 scalpel blade.  Given the location of the defect, shape of the defect and the proximity to free margins a dermal autograft was deemed most appropriate.  Using a sterile surgical marker, the primary defect shape was transferred to the donor site. The area thus outlined was incised deep to adipose tissue with a #15 scalpel blade.  The harvested graft was then trimmed of adipose and epidermal tissue until only dermis was left.  The skin graft was then placed in the primary defect and oriented appropriately. Skin Substitute Text: The defect edges were debeveled with a #15 scalpel blade.  Given the location of the defect, shape of the defect and the proximity to free margins a skin substitute graft was deemed most appropriate.  The graft material was trimmed to fit the size of the defect. The graft was then placed in the primary defect and oriented appropriately. Tissue Cultured Epidermal Autograft Text: The defect edges were debeveled with a #15 scalpel blade.  Given the location of the defect, shape of the defect and the proximity to free margins a tissue cultured epidermal autograft was deemed most appropriate.  The graft was then trimmed to fit the size of the defect.  The graft was then placed in the primary defect and oriented appropriately. Xenograft Text: The defect edges were debeveled with a #15 scalpel blade.  Given the location of the defect, shape of the defect and the proximity to free margins a xenograft was deemed most appropriate.  The graft was then trimmed to fit the size of the defect.  The graft was then placed in the primary defect and oriented appropriately. Purse String (Simple) Text: Given the location of the defect and the characteristics of the surrounding skin a purse string closure was deemed most appropriate.  Undermining was performed circumferentially around the surgical defect.  A purse string suture was then placed and tightened. Purse String (Intermediate) Text: Given the location of the defect and the characteristics of the surrounding skin a purse string intermediate closure was deemed most appropriate.  Undermining was performed circumferentially around the surgical defect.  A purse string suture was then placed and tightened. Partial Purse String (Simple) Text: Given the location of the defect and the characteristics of the surrounding skin a simple purse string closure was deemed most appropriate.  Undermining was performed circumferentially around the surgical defect.  A purse string suture was then placed and tightened. Wound tension only allowed a partial closure of the circular defect. Partial Purse String (Intermediate) Text: Given the location of the defect and the characteristics of the surrounding skin an intermediate purse string closure was deemed most appropriate.  Undermining was performed circumferentially around the surgical defect.  A purse string suture was then placed and tightened. Wound tension only allowed a partial closure of the circular defect. Localized Dermabrasion With Wire Brush Text: The patient was draped in routine manner.  Localized dermabrasion using 3 x 17 mm wire brush was performed in routine manner to papillary dermis. This spot dermabrasion is being performed to complete skin cancer reconstruction. It also will eliminate the other sun damaged precancerous cells that are known to be part of the regional effect of a lifetime's worth of sun exposure. This localized dermabrasion is therapeutic and should not be considered cosmetic in any regard. Localized Dermabrasion With Sand Papertext: The patient was draped in routine manner.  Localized dermabrasion using sterile sand paper was performed in routine manner to papillary dermis. This spot dermabrasion is being performed to complete skin cancer reconstruction. It also will eliminate the other sun damaged precancerous cells that are known to be part of the regional effect of a lifetime's worth of sun exposure. This localized dermabrasion is therapeutic and should not be considered cosmetic in any regard. Localized Dermabrasion With 15 Blade Text: The patient was draped in routine manner.  Localized dermabrasion using a 15 blade was performed in routine manner to papillary dermis. This spot dermabrasion is being performed to complete skin cancer reconstruction. It also will eliminate the other sun damaged precancerous cells that are known to be part of the regional effect of a lifetime's worth of sun exposure. This localized dermabrasion is therapeutic and should not be considered cosmetic in any regard. Tarsorrhaphy Text: A tarsorrhaphy was performed using Frost sutures. Intermediate Repair And Flap Additional Text (Will Appearing After The Standard Complex Repair Text): The intermediate repair was not sufficient to completely close the primary defect. The remaining additional defect was repaired with the flap mentioned below. Intermediate Repair And Graft Additional Text (Will Appearing After The Standard Complex Repair Text): The intermediate repair was not sufficient to completely close the primary defect. The remaining additional defect was repaired with the graft mentioned below. Complex Repair And Flap Additional Text (Will Appearing After The Standard Complex Repair Text): The complex repair was not sufficient to completely close the primary defect. The remaining additional defect was repaired with the flap mentioned below. Complex Repair And Graft Additional Text (Will Appearing After The Standard Complex Repair Text): The complex repair was not sufficient to completely close the primary defect. The remaining additional defect was repaired with the graft mentioned below. Eyelid Full Thickness Repair - 31479: The eyelid defect was full thickness which required a wedge repair of the eyelid. Special care was taken to ensure that the eyelid margin was realligned when placing sutures. Eyelid Partial Thickness Repair - 34546: The eyelid defect was partial thickness which required a wedge repair of the eyelid. Special care was taken to ensure that the eyelid margin was realligned when placing sutures. Manual Repair Warning Statement: We plan on removing the manually selected variable below in favor of our much easier automatic structured text blocks found in the previous tab. We decided to do this to help make the flow better and give you the full power of structured data. Manual selection is never going to be ideal in our platform and I would encourage you to avoid using manual selection from this point on, especially since I will be sunsetting this feature. It is important that you do one of two things with the customized text below. First, you can save all of the text in a word file so you can have it for future reference. Second, transfer the text to the appropriate area in the Library tab. Lastly, if there is a flap or graft type which we do not have you need to let us know right away so I can add it in before the variable is hidden. No need to panic, we plan to give you roughly 6 months to make the change. Same Histology In Subsequent Stages Text: The pattern and morphology of the tumor is as described in the first stage. No Residual Tumor Seen Histology Text: There were no malignant cells seen in the sections examined. Inflammation Suggestive Of Cancer Camouflage Histology Text: There was a dense lymphocytic infiltrate which prevented adequate histologic evaluation of adjacent structures. Incidental Superficial Basal Cell Carcinoma Histology Text: Atypical basaloid cells are seen in focal aggregates attached to epidermis. Incidental Squamous Cell Carcinoma In Situ Histology Text: Focal atypical squamous cells are seen involving full thickness epidermis. Incidental Basal Cell Carcinoma Floater Histology Text: A focal aggregate of atypical basaloid cells is seen on the specimen slide  from main tissue section. Incidental Squamous Cell Carcinoma Floater Histology Text: A focal aggregate of atypical squamous cells is seen on the specimen slide  from the main tissue section. Incidental Actinic Keratosis Histology Text: Focal partial thickness squamous atypical is seen. Missing Epidermis Histology Text: Missing epidermis is seen focally. Bcc Histology Text: There were numerous aggregates of basaloid cells. Bcc Infiltrative Histology Text: There were numerous aggregates of basaloid cells demonstrating an infiltrative pattern. Mixed Nodular And Infiltrative Bcc Histology Text: Atypical basaloid cells seen in infiltrating aggregates. Scc Histology Text: Atypical squamous cells are seen invading the dermis. Scc Well Differentiated Histology Text: Atypical squamous cells in nests are seen extending through dermis. Individual cells are seen exhibiting large eosinophilic cytoplasm. Scc Moderately Differentiated Histology Text: Atypical squamous cells are seen within the dermis in a disorganized pattern. Individual cells exhibit little keratinization and nuclear pleomophism. Scc In Situ With Follicular Extension Histology Text: Atypical squamous cells seen throughout full thickness epidermis along with extension of atypical squamous cells downward along follicular structures. Basosquamous Cell Carcinoma Histology Text: Atypical basaloid cells seen in aggregates along with squamous differentiation with cells containing abundant eosinophilic cytoplasm. Mart-1 - Positive Histology Text: MART-1 staining demonstrates areas of higher density and clustering of melanocytes with Pagetoid spread upwards within the epidermis. The surgical margins are positive for tumor cells. Mart-1 - Negative Histology Text: MART-1 staining demonstrates a normal density and pattern of melanocytes along the dermal-epidermal junction. The surgical margins are negative for tumor cells. Information: Selecting Yes will display possible errors in your note based on the variables you have selected. This validation is only offered as a suggestion for you. PLEASE NOTE THAT THE VALIDATION TEXT WILL BE REMOVED WHEN YOU FINALIZE YOUR NOTE. IF YOU WANT TO FAX A PRELIMINARY NOTE YOU WILL NEED TO TOGGLE THIS TO 'NO' IF YOU DO NOT WANT IT IN YOUR FAXED NOTE. Bill 59 Modifier?: No - Continue to Bill 79 Modifier

## 2024-10-24 ENCOUNTER — OUTPATIENT (OUTPATIENT)
Dept: OUTPATIENT SERVICES | Facility: HOSPITAL | Age: 74
LOS: 1 days | Discharge: HOME | End: 2024-10-24
Payer: COMMERCIAL

## 2024-10-24 ENCOUNTER — TRANSCRIPTION ENCOUNTER (OUTPATIENT)
Age: 74
End: 2024-10-24

## 2024-10-24 VITALS
OXYGEN SATURATION: 97 % | RESPIRATION RATE: 19 BRPM | SYSTOLIC BLOOD PRESSURE: 120 MMHG | HEART RATE: 58 BPM | DIASTOLIC BLOOD PRESSURE: 73 MMHG

## 2024-10-24 VITALS — HEIGHT: 72 IN | WEIGHT: 199.96 LBS

## 2024-10-24 DIAGNOSIS — I25.10 ATHEROSCLEROTIC HEART DISEASE OF NATIVE CORONARY ARTERY WITHOUT ANGINA PECTORIS: ICD-10-CM

## 2024-10-24 DIAGNOSIS — Z98.890 OTHER SPECIFIED POSTPROCEDURAL STATES: Chronic | ICD-10-CM

## 2024-10-24 LAB
ALBUMIN SERPL ELPH-MCNC: 4.1 G/DL — SIGNIFICANT CHANGE UP (ref 3.3–5)
ALP SERPL-CCNC: 94 U/L — SIGNIFICANT CHANGE UP (ref 40–120)
ALT FLD-CCNC: 15 U/L — SIGNIFICANT CHANGE UP (ref 4–41)
ANION GAP SERPL CALC-SCNC: 15 MMOL/L — HIGH (ref 7–14)
AST SERPL-CCNC: 24 U/L — SIGNIFICANT CHANGE UP (ref 4–40)
BILIRUB SERPL-MCNC: 0.5 MG/DL — SIGNIFICANT CHANGE UP (ref 0.2–1.2)
BUN SERPL-MCNC: 21 MG/DL — SIGNIFICANT CHANGE UP (ref 7–23)
CALCIUM SERPL-MCNC: 9.4 MG/DL — SIGNIFICANT CHANGE UP (ref 8.4–10.5)
CHLORIDE SERPL-SCNC: 103 MMOL/L — SIGNIFICANT CHANGE UP (ref 98–107)
CO2 SERPL-SCNC: 21 MMOL/L — LOW (ref 22–31)
CREAT SERPL-MCNC: 0.86 MG/DL — SIGNIFICANT CHANGE UP (ref 0.5–1.3)
EGFR: 92 ML/MIN/1.73M2 — SIGNIFICANT CHANGE UP
GLUCOSE SERPL-MCNC: 102 MG/DL — HIGH (ref 70–99)
HCT VFR BLD CALC: 36.8 % — LOW (ref 39–50)
HGB BLD-MCNC: 12.3 G/DL — LOW (ref 13–17)
MAGNESIUM SERPL-MCNC: 2 MG/DL — SIGNIFICANT CHANGE UP (ref 1.6–2.6)
MCHC RBC-ENTMCNC: 29.7 PG — SIGNIFICANT CHANGE UP (ref 27–34)
MCHC RBC-ENTMCNC: 33.4 GM/DL — SIGNIFICANT CHANGE UP (ref 32–36)
MCV RBC AUTO: 88.9 FL — SIGNIFICANT CHANGE UP (ref 80–100)
NRBC # BLD: 0 /100 WBCS — SIGNIFICANT CHANGE UP (ref 0–0)
NRBC # FLD: 0 K/UL — SIGNIFICANT CHANGE UP (ref 0–0)
PHOSPHATE SERPL-MCNC: 3.1 MG/DL — SIGNIFICANT CHANGE UP (ref 2.5–4.5)
PLATELET # BLD AUTO: 131 K/UL — LOW (ref 150–400)
POTASSIUM SERPL-MCNC: 4.5 MMOL/L — SIGNIFICANT CHANGE UP (ref 3.5–5.3)
POTASSIUM SERPL-SCNC: 4.5 MMOL/L — SIGNIFICANT CHANGE UP (ref 3.5–5.3)
PROT SERPL-MCNC: 7.1 G/DL — SIGNIFICANT CHANGE UP (ref 6–8.3)
RBC # BLD: 4.14 M/UL — LOW (ref 4.2–5.8)
RBC # FLD: 13.4 % — SIGNIFICANT CHANGE UP (ref 10.3–14.5)
SODIUM SERPL-SCNC: 139 MMOL/L — SIGNIFICANT CHANGE UP (ref 135–145)
WBC # BLD: 4.35 K/UL — SIGNIFICANT CHANGE UP (ref 3.8–10.5)
WBC # FLD AUTO: 4.35 K/UL — SIGNIFICANT CHANGE UP (ref 3.8–10.5)

## 2024-10-24 PROCEDURE — 93010 ELECTROCARDIOGRAM REPORT: CPT

## 2024-10-24 PROCEDURE — 93458 L HRT ARTERY/VENTRICLE ANGIO: CPT | Mod: 26

## 2024-10-24 RX ORDER — ISOSORBIDE MONONITRATE 60 MG/1
1 TABLET, EXTENDED RELEASE ORAL
Qty: 30 | Refills: 0
Start: 2024-10-24 | End: 2024-11-22

## 2024-10-24 RX ORDER — SODIUM CHLORIDE 9 MG/ML
250 INJECTION, SOLUTION INTRAMUSCULAR; INTRAVENOUS; SUBCUTANEOUS
Refills: 0 | Status: ACTIVE | OUTPATIENT
Start: 2024-10-24 | End: 2024-10-24

## 2024-10-24 RX ORDER — ASPIRIN/MAG CARB/ALUMINUM AMIN 325 MG
1 TABLET ORAL
Qty: 0 | Refills: 0 | DISCHARGE
Start: 2024-10-24

## 2024-10-24 RX ORDER — SODIUM CHLORIDE 9 MG/ML
250 INJECTION, SOLUTION INTRAMUSCULAR; INTRAVENOUS; SUBCUTANEOUS ONCE
Refills: 0 | Status: COMPLETED | OUTPATIENT
Start: 2024-10-24 | End: 2024-10-24

## 2024-10-24 RX ORDER — LOSARTAN POTASSIUM 25 MG/1
100 TABLET ORAL DAILY
Refills: 0 | Status: ACTIVE | OUTPATIENT
Start: 2024-10-25 | End: 2025-09-23

## 2024-10-24 RX ORDER — GABAPENTIN 300 MG/1
100 CAPSULE ORAL DAILY
Refills: 0 | Status: ACTIVE | OUTPATIENT
Start: 2024-10-24 | End: 2025-09-22

## 2024-10-24 RX ORDER — FOLIC ACID 1 MG/1
1 TABLET ORAL DAILY
Refills: 0 | Status: ACTIVE | OUTPATIENT
Start: 2024-10-24 | End: 2025-09-22

## 2024-10-24 RX ORDER — B-COMPLEX WITH VITAMIN C
1 VIAL (ML) INJECTION DAILY
Refills: 0 | Status: ACTIVE | OUTPATIENT
Start: 2024-10-24 | End: 2025-09-22

## 2024-10-24 RX ORDER — ATENOLOL 100 MG
50 TABLET ORAL DAILY
Refills: 0 | Status: ACTIVE | OUTPATIENT
Start: 2024-10-24 | End: 2025-09-22

## 2024-10-24 RX ORDER — NIFEDIPINE 90 MG
60 TABLET, EXTENDED RELEASE 24 HR ORAL DAILY
Refills: 0 | Status: ACTIVE | OUTPATIENT
Start: 2024-10-25 | End: 2025-09-23

## 2024-10-24 RX ORDER — SODIUM CHLORIDE 9 MG/ML
3 INJECTION, SOLUTION INTRAMUSCULAR; INTRAVENOUS; SUBCUTANEOUS EVERY 8 HOURS
Refills: 0 | Status: ACTIVE | OUTPATIENT
Start: 2024-10-24 | End: 2025-09-22

## 2024-10-24 RX ORDER — FAMOTIDINE 10 MG/ML
40 INJECTION INTRAVENOUS DAILY
Refills: 0 | Status: ACTIVE | OUTPATIENT
Start: 2024-10-24 | End: 2025-09-22

## 2024-10-24 RX ORDER — ASPIRIN/MAG CARB/ALUMINUM AMIN 325 MG
1 TABLET ORAL
Qty: 30 | Refills: 0
Start: 2024-10-24 | End: 2024-11-22

## 2024-10-24 RX ORDER — PANTOPRAZOLE SODIUM 40 MG/1
40 TABLET, DELAYED RELEASE ORAL
Refills: 0 | Status: ACTIVE | OUTPATIENT
Start: 2024-10-24 | End: 2025-09-22

## 2024-10-24 RX ORDER — EZETIMIBE 10 MG/1
10 TABLET ORAL DAILY
Refills: 0 | Status: ACTIVE | OUTPATIENT
Start: 2024-10-24 | End: 2025-09-22

## 2024-10-24 RX ORDER — ASPIRIN/MAG CARB/ALUMINUM AMIN 325 MG
81 TABLET ORAL DAILY
Refills: 0 | Status: ACTIVE | OUTPATIENT
Start: 2024-10-24 | End: 2025-09-22

## 2024-10-24 RX ADMIN — SODIUM CHLORIDE 750 MILLILITER(S): 9 INJECTION, SOLUTION INTRAMUSCULAR; INTRAVENOUS; SUBCUTANEOUS at 12:25

## 2024-10-24 RX ADMIN — SODIUM CHLORIDE 75 MILLILITER(S): 9 INJECTION, SOLUTION INTRAMUSCULAR; INTRAVENOUS; SUBCUTANEOUS at 12:25

## 2024-10-24 NOTE — ASU DISCHARGE PLAN (ADULT/PEDIATRIC) - FINANCIAL ASSISTANCE
[facility] provides services at a reduced cost to those who are determined to be eligible through [facility]’s financial assistance program. Information regarding [facility]’s financial assistance program can be found by going to [email or web address] or by calling [phone].

## 2024-10-24 NOTE — ASU DISCHARGE PLAN (ADULT/PEDIATRIC) - ASU DC SPECIAL INSTRUCTIONSFT
WOUND CARE:  The day after your procedure:  - Remove the bandage at the site gently, clean with a small amount of soap and water, and pat try. Leave open to air.  - You may shower.  - Do not apply lotions, creams, ointments, powder, or perfumes to your incision site.  - Check your wrist or groin daily. A small amount of bruising or soreness is normal.  - Do not soak the procedural site for 1 week (no baths, pools, tubs, etc).    ACTIVITY:  For the next 24 hours:  - Do not drive or operate heavy machinery.  - Do not drink any alcoholic beverages.  - Do not make any important decisions or sign legal documents.    If your procedure was performed through the wrist,  For the next 3 days:  - Avoid pushing and pulling with the affected wrist.   - Avoid repeated movement of the affected hand and wrist (typing, hammering).  - Do not lift anything more than 5 pounds.    If your procedure was performed through the groin,  For the next 5 days:  - Limit climbing stairs.  - Do not soak in a bathtub or pool.  - Avoid strenuous activities (pushing, pulling, straining).  - Do not lift anything more than 10 pounds.    MEDICATION:   - Take your medications as explained (see attached medication reconciliation on discharge paperwork).    ADDITIONAL INSTRUCTIONS:  - Follow a heart healthy diet recommended by your doctor.   - If you smoke, we encourage smoking cessation. You may call [phone] for center of tobacco control if you need assistance.  - Call your doctor to make appointment within 2 weeks.    ***CALL YOUR DOCTOR***  - If you experience fever, chills, body aches, or severe pain, swelling, redness, heat, or yellow discharge from your incision site.  - If you notice bleeding or significant swelling at the procedural site.  - If you experience chest pain.  - If you experience extremity numbness, tingling, or temperature change.    If you are unable to get in contact with your doctor, you may contact the [facility] at [phone].  Please reference your discharge instructions for any questions or concerns.

## 2024-10-24 NOTE — H&P CARDIOLOGY - NSICDXPASTMEDICALHX_GEN_ALL_CORE_FT
PAST MEDICAL HISTORY:  AA (alcohol abuse)     Aortic root dilation     GERD (gastroesophageal reflux disease)     HLD (hyperlipidemia)     HTN (hypertension)     PAF (paroxysmal atrial fibrillation)

## 2024-10-24 NOTE — H&P CARDIOLOGY - COMMENTS
Pre Procedural Sedation Evaluation    Urine pregnancy: N/A  Dentures: None  Last PO intake: 10/23/2024 at 20:00  Obstructive sleep apnea: No  Aspiration risk: No  Mallampati score: 2  ASA Classification: 2  Prior Sedative or Anesthesia Experience: No complications  Informed consent by responsible adult: Yes  Responsible adult escort: Yes  Based on today's assessment, anesthesia consult requested: No

## 2024-10-24 NOTE — H&P CARDIOLOGY - HISTORY OF PRESENT ILLNESS
75 y/o male with a PMHx of paroxysmal atrial fibrillation not on AC (prescribed Eliquis but self discontinued due to concern of fall), aortic root dilatation, HTN, HLD, GERD and alcohol abuse presents for elective cardiac catheterization. Pt was hospitalized at [facility] in July 2023 after an episode of lightheadedness and palpitations secondary to atrial fibrillation. He was started on Eliquis but never took it. He was hospitalized again in October 2023 after an episode of fall with LOC sustaining multiple rib fractures. Pt continues to refuse taking Eliquis given history of falls and concern for bleeding. Pt followed up with Dr. Toth for episodes of chest pain. Pt says he has random episodes of non-radiating, non-exertional right sided chest pain with no clear provoking features. Pt underwent coronary CT angiogram which revealed a possible distal LM lesion of 50-69%. Pt is now referred for cardiac catheterization. Pt denies fever, chills, recent travel, headache, visual deficits, shortness of breath, orthopnea, palpitations, abdominal pain, N/V/D/C, hematochezia, melena, dysuria, hematuria, LOC, syncope, peripheral edema.    Cardiologist: Dr. Nicole Toth

## 2024-10-24 NOTE — ASU DISCHARGE PLAN (ADULT/PEDIATRIC) - CARE PROVIDER_API CALL
Nicloe Toth  Cardiology  [address] [place] [address]  Phone: [phone]  Fax: [phone]  Follow Up Time:     Vinny Garcia  Thoracic and Cardiac Surgery  [address] [place] [address]  Phone: [phone]  Fax: [phone]  Follow Up Time:

## 2024-10-24 NOTE — H&P CARDIOLOGY - PRESSURE ULCER(S)
Upon review of the In Basket request we were able to locate, review, and update the patient chart as requested for Mammogram.    Any additional questions or concerns should be emailed to the Practice Liaisons via the appropriate education email address, please do not reply via In Basket.    Thank you  Pete Monreal MA   [facility]           no

## 2024-10-24 NOTE — ASU PATIENT PROFILE, ADULT - FALL HARM RISK - UNIVERSAL INTERVENTIONS
Bed in lowest position, wheels locked, appropriate side rails in place/Call bell, personal items and telephone in reach/Instruct patient to call for assistance before getting out of bed or chair/Non-slip footwear when patient is out of bed/[place] to call system/Physically safe environment - no spills, clutter or unnecessary equipment/Purposeful Proactive Rounding/Room/bathroom lighting operational, light cord in reach

## 2024-10-24 NOTE — ASU DISCHARGE PLAN (ADULT/PEDIATRIC) - COMMENTS
Follow up with Dr. Toth within 1 week. You will also need to follow up with Dr. Garcia from CT surgery ([facility]).

## 2024-10-25 PROBLEM — K21.9 GASTRO-ESOPHAGEAL REFLUX DISEASE WITHOUT ESOPHAGITIS: Chronic | Status: ACTIVE | Noted: 2024-10-24

## 2024-10-25 PROBLEM — F10.10 ALCOHOL ABUSE, UNCOMPLICATED: Chronic | Status: ACTIVE | Noted: 2024-10-24

## 2024-10-25 PROBLEM — I10 ESSENTIAL (PRIMARY) HYPERTENSION: Chronic | Status: ACTIVE | Noted: 2024-10-24

## 2024-10-25 PROBLEM — I48.0 PAROXYSMAL ATRIAL FIBRILLATION: Chronic | Status: ACTIVE | Noted: 2024-10-24

## 2024-10-25 PROBLEM — I77.810 THORACIC AORTIC ECTASIA: Chronic | Status: ACTIVE | Noted: 2024-10-24

## 2024-10-25 PROBLEM — E78.5 HYPERLIPIDEMIA, UNSPECIFIED: Chronic | Status: ACTIVE | Noted: 2024-10-24

## 2024-10-30 PROBLEM — I25.10 LEFT MAIN CORONARY ARTERY DISEASE: Status: ACTIVE | Noted: 2024-10-30

## 2024-10-31 ENCOUNTER — APPOINTMENT (OUTPATIENT)
Dept: CARDIOTHORACIC SURGERY | Facility: CLINIC | Age: 74
End: 2024-10-31
Payer: COMMERCIAL

## 2024-10-31 VITALS
HEART RATE: 60 BPM | BODY MASS INDEX: 27.09 KG/M2 | WEIGHT: 200 LBS | SYSTOLIC BLOOD PRESSURE: 108 MMHG | HEIGHT: 72 IN | RESPIRATION RATE: 16 BRPM | DIASTOLIC BLOOD PRESSURE: 59 MMHG | OXYGEN SATURATION: 95 % | TEMPERATURE: 97.8 F

## 2024-10-31 DIAGNOSIS — J45.909 UNSPECIFIED ASTHMA, UNCOMPLICATED: ICD-10-CM

## 2024-10-31 DIAGNOSIS — I25.10 ATHEROSCLEROTIC HEART DISEASE OF NATIVE CORONARY ARTERY W/OUT ANGINA PECTORIS: ICD-10-CM

## 2024-10-31 DIAGNOSIS — Q25.43 CONGENITAL ANEURYSM OF AORTA: ICD-10-CM

## 2024-10-31 DIAGNOSIS — I10 ESSENTIAL (PRIMARY) HYPERTENSION: ICD-10-CM

## 2024-10-31 DIAGNOSIS — E78.5 HYPERLIPIDEMIA, UNSPECIFIED: ICD-10-CM

## 2024-10-31 DIAGNOSIS — K21.9 GASTRO-ESOPHAGEAL REFLUX DISEASE W/OUT ESOPHAGITIS: ICD-10-CM

## 2024-10-31 DIAGNOSIS — I77.810 THORACIC AORTIC ECTASIA: ICD-10-CM

## 2024-10-31 DIAGNOSIS — I48.91 UNSPECIFIED ATRIAL FIBRILLATION: ICD-10-CM

## 2024-10-31 PROCEDURE — 99204 OFFICE O/P NEW MOD 45 MIN: CPT

## 2024-10-31 RX ORDER — ATORVASTATIN CALCIUM 80 MG/1
80 TABLET, FILM COATED ORAL
Qty: 30 | Refills: 0 | Status: ACTIVE | COMMUNITY
Start: 2024-10-31

## 2024-10-31 RX ORDER — ELECTROLYTES/DEXTROSE
SOLUTION, ORAL ORAL
Qty: 90 | Refills: 0 | Status: ACTIVE | COMMUNITY
Start: 2024-10-31

## 2024-10-31 RX ORDER — ISOSORBIDE MONONITRATE 30 MG/1
30 TABLET, EXTENDED RELEASE ORAL DAILY
Refills: 0 | Status: ACTIVE | COMMUNITY
Start: 2024-10-31

## 2024-10-31 RX ORDER — LOSARTAN POTASSIUM 100 MG/1
100 TABLET, FILM COATED ORAL
Qty: 1 | Refills: 3 | Status: ACTIVE | COMMUNITY
Start: 2024-10-31

## 2024-10-31 RX ORDER — EZETIMIBE 10 MG/1
10 TABLET ORAL
Qty: 60 | Refills: 0 | Status: ACTIVE | COMMUNITY
Start: 2024-10-31

## 2024-10-31 RX ORDER — ASPIRIN ENTERIC COATED TABLETS 81 MG 81 MG/1
81 TABLET, DELAYED RELEASE ORAL DAILY
Qty: 30 | Refills: 0 | Status: ACTIVE | COMMUNITY
Start: 2024-10-31

## 2024-10-31 RX ORDER — PANTOPRAZOLE 40 MG/1
40 TABLET, DELAYED RELEASE ORAL DAILY
Qty: 30 | Refills: 0 | Status: ACTIVE | COMMUNITY
Start: 2024-10-31

## 2024-10-31 RX ORDER — FOLIC ACID 1 MG/1
1 TABLET ORAL
Qty: 30 | Refills: 3 | Status: ACTIVE | COMMUNITY
Start: 2024-10-31

## 2024-10-31 RX ORDER — FAMOTIDINE 40 MG/1
40 TABLET, FILM COATED ORAL DAILY
Refills: 0 | Status: ACTIVE | COMMUNITY
Start: 2024-10-31

## 2024-10-31 RX ORDER — ATENOLOL 50 MG/1
50 TABLET ORAL DAILY
Refills: 0 | Status: ACTIVE | COMMUNITY
Start: 2024-10-31

## 2024-10-31 RX ORDER — GABAPENTIN 100 MG/1
100 CAPSULE ORAL
Qty: 30 | Refills: 2 | Status: ACTIVE | COMMUNITY
Start: 2024-10-31

## 2024-11-07 ENCOUNTER — RESULT REVIEW (OUTPATIENT)
Age: 74
End: 2024-11-07

## 2024-11-07 ENCOUNTER — OUTPATIENT (OUTPATIENT)
Dept: OUTPATIENT SERVICES | Facility: HOSPITAL | Age: 74
LOS: 1 days | End: 2024-11-07
Payer: MEDICARE

## 2024-11-07 ENCOUNTER — APPOINTMENT (OUTPATIENT)
Dept: CT IMAGING | Facility: IMAGING CENTER | Age: 74
End: 2024-11-07
Payer: MEDICARE

## 2024-11-07 VITALS
WEIGHT: 203.05 LBS | TEMPERATURE: 98 F | OXYGEN SATURATION: 97 % | SYSTOLIC BLOOD PRESSURE: 109 MMHG | DIASTOLIC BLOOD PRESSURE: 66 MMHG | HEART RATE: 78 BPM | HEIGHT: 72 IN | RESPIRATION RATE: 18 BRPM

## 2024-11-07 DIAGNOSIS — Z98.890 OTHER SPECIFIED POSTPROCEDURAL STATES: Chronic | ICD-10-CM

## 2024-11-07 DIAGNOSIS — Z01.818 ENCOUNTER FOR OTHER PREPROCEDURAL EXAMINATION: ICD-10-CM

## 2024-11-07 DIAGNOSIS — I25.10 ATHEROSCLEROTIC HEART DISEASE OF NATIVE CORONARY ARTERY WITHOUT ANGINA PECTORIS: ICD-10-CM

## 2024-11-07 DIAGNOSIS — F10.11 ALCOHOL ABUSE, IN REMISSION: ICD-10-CM

## 2024-11-07 DIAGNOSIS — I10 ESSENTIAL (PRIMARY) HYPERTENSION: ICD-10-CM

## 2024-11-07 DIAGNOSIS — I77.810 THORACIC AORTIC ECTASIA: ICD-10-CM

## 2024-11-07 LAB
A1C WITH ESTIMATED AVERAGE GLUCOSE RESULT: 6.1 % — HIGH (ref 4–5.6)
ALBUMIN SERPL ELPH-MCNC: 4.6 G/DL — SIGNIFICANT CHANGE UP (ref 3.3–5)
ALP SERPL-CCNC: 101 U/L — SIGNIFICANT CHANGE UP (ref 40–120)
ALT FLD-CCNC: 32 U/L — SIGNIFICANT CHANGE UP (ref 10–45)
ANION GAP SERPL CALC-SCNC: 16 MMOL/L — SIGNIFICANT CHANGE UP (ref 5–17)
AST SERPL-CCNC: 52 U/L — HIGH (ref 10–40)
BASOPHILS # BLD AUTO: 0.05 K/UL — SIGNIFICANT CHANGE UP (ref 0–0.2)
BASOPHILS NFR BLD AUTO: 1.1 % — SIGNIFICANT CHANGE UP (ref 0–2)
BILIRUB SERPL-MCNC: 0.5 MG/DL — SIGNIFICANT CHANGE UP (ref 0.2–1.2)
BLD GP AB SCN SERPL QL: NEGATIVE — SIGNIFICANT CHANGE UP
BUN SERPL-MCNC: 24 MG/DL — HIGH (ref 7–23)
CALCIUM SERPL-MCNC: 10.1 MG/DL — SIGNIFICANT CHANGE UP (ref 8.4–10.5)
CHLORIDE SERPL-SCNC: 100 MMOL/L — SIGNIFICANT CHANGE UP (ref 96–108)
CO2 SERPL-SCNC: 22 MMOL/L — SIGNIFICANT CHANGE UP (ref 22–31)
CREAT SERPL-MCNC: 1.1 MG/DL — SIGNIFICANT CHANGE UP (ref 0.5–1.3)
EGFR: 71 ML/MIN/1.73M2 — SIGNIFICANT CHANGE UP
EOSINOPHIL # BLD AUTO: 0.16 K/UL — SIGNIFICANT CHANGE UP (ref 0–0.5)
EOSINOPHIL NFR BLD AUTO: 3.6 % — SIGNIFICANT CHANGE UP (ref 0–6)
ESTIMATED AVERAGE GLUCOSE: 128 MG/DL — HIGH (ref 68–114)
GLUCOSE SERPL-MCNC: 143 MG/DL — HIGH (ref 70–99)
HCT VFR BLD CALC: 38.5 % — LOW (ref 39–50)
HGB BLD-MCNC: 13 G/DL — SIGNIFICANT CHANGE UP (ref 13–17)
IMM GRANULOCYTES NFR BLD AUTO: 0.2 % — SIGNIFICANT CHANGE UP (ref 0–0.9)
INR BLD: 0.95 RATIO — SIGNIFICANT CHANGE UP (ref 0.85–1.16)
LYMPHOCYTES # BLD AUTO: 1.66 K/UL — SIGNIFICANT CHANGE UP (ref 1–3.3)
LYMPHOCYTES # BLD AUTO: 37.1 % — SIGNIFICANT CHANGE UP (ref 13–44)
MCHC RBC-ENTMCNC: 30 PG — SIGNIFICANT CHANGE UP (ref 27–34)
MCHC RBC-ENTMCNC: 33.8 G/DL — SIGNIFICANT CHANGE UP (ref 32–36)
MCV RBC AUTO: 88.7 FL — SIGNIFICANT CHANGE UP (ref 80–100)
MONOCYTES # BLD AUTO: 0.49 K/UL — SIGNIFICANT CHANGE UP (ref 0–0.9)
MONOCYTES NFR BLD AUTO: 10.9 % — SIGNIFICANT CHANGE UP (ref 2–14)
MRSA PCR RESULT.: SIGNIFICANT CHANGE UP
NEUTROPHILS # BLD AUTO: 2.11 K/UL — SIGNIFICANT CHANGE UP (ref 1.8–7.4)
NEUTROPHILS NFR BLD AUTO: 47.1 % — SIGNIFICANT CHANGE UP (ref 43–77)
PLATELET # BLD AUTO: 212 K/UL — SIGNIFICANT CHANGE UP (ref 150–400)
POTASSIUM SERPL-MCNC: 4.5 MMOL/L — SIGNIFICANT CHANGE UP (ref 3.5–5.3)
POTASSIUM SERPL-SCNC: 4.5 MMOL/L — SIGNIFICANT CHANGE UP (ref 3.5–5.3)
PROT SERPL-MCNC: 7.9 G/DL — SIGNIFICANT CHANGE UP (ref 6–8.3)
PROTHROM AB SERPL-ACNC: 10.9 SEC — SIGNIFICANT CHANGE UP (ref 9.9–13.4)
RBC # BLD: 4.34 M/UL — SIGNIFICANT CHANGE UP (ref 4.2–5.8)
RBC # FLD: 14 % — SIGNIFICANT CHANGE UP (ref 10.3–14.5)
RH IG SCN BLD-IMP: POSITIVE — SIGNIFICANT CHANGE UP
S AUREUS DNA NOSE QL NAA+PROBE: SIGNIFICANT CHANGE UP
SODIUM SERPL-SCNC: 138 MMOL/L — SIGNIFICANT CHANGE UP (ref 135–145)
WBC # BLD: 4.48 K/UL — SIGNIFICANT CHANGE UP (ref 3.8–10.5)
WBC # FLD AUTO: 4.48 K/UL — SIGNIFICANT CHANGE UP (ref 3.8–10.5)

## 2024-11-07 PROCEDURE — 93880 EXTRACRANIAL BILAT STUDY: CPT

## 2024-11-07 PROCEDURE — 87641 MR-STAPH DNA AMP PROBE: CPT

## 2024-11-07 PROCEDURE — 86900 BLOOD TYPING SEROLOGIC ABO: CPT

## 2024-11-07 PROCEDURE — 85025 COMPLETE CBC W/AUTO DIFF WBC: CPT

## 2024-11-07 PROCEDURE — 83036 HEMOGLOBIN GLYCOSYLATED A1C: CPT

## 2024-11-07 PROCEDURE — 93880 EXTRACRANIAL BILAT STUDY: CPT | Mod: 26

## 2024-11-07 PROCEDURE — 86901 BLOOD TYPING SEROLOGIC RH(D): CPT

## 2024-11-07 PROCEDURE — 85610 PROTHROMBIN TIME: CPT

## 2024-11-07 PROCEDURE — 87640 STAPH A DNA AMP PROBE: CPT

## 2024-11-07 PROCEDURE — 71275 CT ANGIOGRAPHY CHEST: CPT | Mod: 26

## 2024-11-07 PROCEDURE — G0463: CPT

## 2024-11-07 PROCEDURE — 86850 RBC ANTIBODY SCREEN: CPT

## 2024-11-07 PROCEDURE — 80053 COMPREHEN METABOLIC PANEL: CPT

## 2024-11-07 PROCEDURE — 71275 CT ANGIOGRAPHY CHEST: CPT

## 2024-11-07 RX ORDER — NIFEDIPINE 90 MG
1 TABLET, EXTENDED RELEASE 24 HR ORAL
Refills: 0 | DISCHARGE

## 2024-11-07 NOTE — H&P PST ADULT - NSICDXPASTMEDICALHX_GEN_ALL_CORE_FT
PAST MEDICAL HISTORY:  AA (alcohol abuse)     Aortic root dilation     CAD (coronary artery disease)     GERD (gastroesophageal reflux disease)     HLD (hyperlipidemia)     HTN (hypertension)     PAF (paroxysmal atrial fibrillation)

## 2024-11-07 NOTE — H&P PST ADULT - NSICDXPASTSURGICALHX_GEN_ALL_CORE_FT
PAST SURGICAL HISTORY:  History of cardiac cath     S/P hemorrhoidectomy     S/P left inguinal hernia repair

## 2024-11-07 NOTE — H&P PST ADULT - NSICDXFAMILYHX_GEN_ALL_CORE_FT
FAMILY HISTORY:  Father  Still living? Unknown  FHx: stroke, Age at diagnosis: Age Unknown    Mother  Still living? Unknown  FH: diabetes mellitus, Age at diagnosis: Age Unknown    Sibling  Still living? Unknown  FH: HTN (hypertension), Age at diagnosis: Age Unknown

## 2024-11-07 NOTE — H&P PST ADULT - HISTORY OF PRESENT ILLNESS
73 yo male presents to PST prior to scheduled CABG x 3 on 11/13/24 with Dr. Laci Ervin. Pmhx HTN, HLD, Aneurysm of aortic root, AFib (not on AC), GERD, asthma (denies inhaler use), fatty liver, alcohol abuse (reports 4 glasses of wine/nightly for past 10 years; previously abstinent w/ help of AA; hx of cocaine abuse 30 years ago). Poor historian. Went for routine cardiology visit, found to have abnormal stress test. Cardiac cath performed on 10/24/24 revealed severe distal LM stenosis/multi-vessel disease.  Endorses occasional chest pain, dizziness and SALEEM w/ moderate exertion. Denies fever, chills, syncope, falls. Recently evaluated by Dr. Ervin and above surgery was recommended.

## 2024-11-07 NOTE — H&P PST ADULT - LAST ECHOCARDIOGRAM
9/2020 "EF 60-65%, Moderate aortic root dilation, aorta moderately dilated at level of aortic arch, mild concentric LV hypertrophy, mild diastolic dysfunction"

## 2024-11-07 NOTE — H&P PST ADULT - ASSESSMENT
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, able to walk multiple blocks in [place]   Loose teeth or denture: alvertoies    EVAI SCORE    AGE RELATED RISK FACTORS                                                             [ ] Age 41-60 years                                            (1 Point)  [X ] Age: 61-74 years                                           (2 Points)                 [ ] Age= 75 years                                                (3 Points)             DISEASE RELATED RISK FACTORS                                                       [ ] Edema in the lower extremities                 (1 Point)                     [ ] Varicose veins                                               (1 Point)                                 [ X] BMI > 25 Kg/m2                                            (1 Point)                                  [ ] Serious infection (ie PNA)                            (1 Point)                     [ ] Lung disease ( COPD, Emphysema)            (1 Point)                                                                          [ ] Acute myocardial infarction                         (1 Point)                  [ ] Congestive heart failure (in the previous month)  (1 Point)         [ ] Inflammatory bowel disease                            (1 Point)                  [ ] Central venous access, PICC or Port               (2 points)       (within the last month)                                                                [ ] Stroke (in the previous month)                        (5 Points)    [ ] Previous or present malignancy                       (2 points)                                                                                                                                                         HEMATOLOGY RELATED FACTORS                                                         [ ] Prior episodes of VTE                                     (3 Points)                     [ X] Positive family history for VTE                      (3 Points)                  [ ] Prothrombin 03099 A                                     (3 Points)                     [ ] Factor V Leiden                                                (3 Points)                        [ ] Lupus anticoagulants                                      (3 Points)                                                           [ ] Anticardiolipin antibodies                              (3 Points)                                                       [ ] High homocysteine in the blood                   (3 Points)                                             [ ] Other congenital or acquired thrombophilia      (3 Points)                                                [ ] Heparin induced thrombocytopenia                  (3 Points)                                        MOBILITY RELATED FACTORS  [ ] Bed rest                                                         (1 Point)  [ ] Plaster cast                                                    (2 points)  [ ] Bed bound for more than 72 hours           (2 Points)    GENDER SPECIFIC FACTORS  [ ] Pregnancy or had a baby within the last month   (1 Point)  [ ] Post-partum < 6 weeks                                   (1 Point)  [ ] Hormonal therapy  or oral contraception   (1 Point)  [ ] History of pregnancy complications              (1 point)  [ ] Unexplained or recurrent              (1 Point)    OTHER RISK FACTORS                                           (1 Point)  [ X] BMI >40, smoking, diabetes requiring insulin, chemotherapy  blood transfusions and length of surgery over 2 hours    SURGERY RELATED RISK FACTORS  [ ]  Section within the last month     (1 Point)  [ ] Minor surgery                                                  (1 Point)  [ ] Arthroscopic surgery                                       (2 Points)  [ ] Planned major surgery lasting more            (2 Points)      than 45 minutes     [ ] Elective hip or knee joint replacement       (5 points)       surgery                                                TRAUMA RELATED RISK FACTORS  [ ] Fracture of the hip, pelvis, or leg                       (5 Points)  [ ] Spinal cord injury resulting in paralysis             (5 points)       (in the previous month)    [ ] Paralysis  (less than 1 month)                             (5 Points)  [ ] Multiple Trauma within 1 month                        (5 Points)    Total Score [   7     ]    Caprini Score 0-2: Low Risk, NO VTE prophylaxis required for most patients, encourage ambulation  Caprini Score 3-6: Moderate Risk , pharmacologic VTE prophylaxis is indicated for most patients (in the absence of contraindications)  Caprini Score Greater than or =7: High risk, pharmocologic VTE prophylaxis indicated for most patients (in the absence of contraindications)

## 2024-11-07 NOTE — H&P PST ADULT - PROBLEM SELECTOR PLAN 2
Liver function panel and Coags performed at [facility] today. Email sent to surgeon. Requesting social work consult on admission.

## 2024-11-07 NOTE — H&P PST ADULT - PROBLEM SELECTOR PLAN 1
CABG x 3 on 11/13/24 with Dr. Laci Ervin.  Pre-op instructions given. Questions answered.  Patient reports he is no longer taking aspirin 81mg.

## 2024-11-07 NOTE — H&P PST ADULT - PAIN GOAL
Continue Regimen: Triamcinolone cream twice daily on trunk and extremities \\nHydrocortisone ointment apply to face and groin area twice daily Detail Level: Zone Render In Strict Bullet Format?: No 0

## 2024-11-13 ENCOUNTER — INPATIENT (INPATIENT)
Facility: HOSPITAL | Age: 74
LOS: 4 days | Discharge: HOME HEALTH | DRG: 303 | End: 2024-11-18
Attending: THORACIC SURGERY (CARDIOTHORACIC VASCULAR SURGERY) | Admitting: THORACIC SURGERY (CARDIOTHORACIC VASCULAR SURGERY)
Payer: MEDICARE

## 2024-11-13 ENCOUNTER — APPOINTMENT (OUTPATIENT)
Dept: CARDIOTHORACIC SURGERY | Facility: HOSPITAL | Age: 74
End: 2024-11-13

## 2024-11-13 ENCOUNTER — RESULT REVIEW (OUTPATIENT)
Age: 74
End: 2024-11-13

## 2024-11-13 VITALS
HEART RATE: 58 BPM | TEMPERATURE: 98 F | RESPIRATION RATE: 16 BRPM | OXYGEN SATURATION: 97 % | SYSTOLIC BLOOD PRESSURE: 167 MMHG | DIASTOLIC BLOOD PRESSURE: 99 MMHG | WEIGHT: 203.05 LBS | HEIGHT: 71.97 IN

## 2024-11-13 DIAGNOSIS — Z98.890 OTHER SPECIFIED POSTPROCEDURAL STATES: Chronic | ICD-10-CM

## 2024-11-13 DIAGNOSIS — I25.10 ATHEROSCLEROTIC HEART DISEASE OF NATIVE CORONARY ARTERY WITHOUT ANGINA PECTORIS: ICD-10-CM

## 2024-11-13 LAB
ALBUMIN SERPL ELPH-MCNC: 3.7 G/DL — SIGNIFICANT CHANGE UP (ref 3.3–5)
ALP SERPL-CCNC: 68 U/L — SIGNIFICANT CHANGE UP (ref 40–120)
ALT FLD-CCNC: 13 U/L — SIGNIFICANT CHANGE UP (ref 10–45)
ANION GAP SERPL CALC-SCNC: 13 MMOL/L — SIGNIFICANT CHANGE UP (ref 5–17)
APTT BLD: 30 SEC — SIGNIFICANT CHANGE UP (ref 24.5–35.6)
AST SERPL-CCNC: 24 U/L — SIGNIFICANT CHANGE UP (ref 10–40)
BASOPHILS # BLD AUTO: 0.03 K/UL — SIGNIFICANT CHANGE UP (ref 0–0.2)
BASOPHILS NFR BLD AUTO: 0.3 % — SIGNIFICANT CHANGE UP (ref 0–2)
BILIRUB SERPL-MCNC: 0.8 MG/DL — SIGNIFICANT CHANGE UP (ref 0.2–1.2)
BUN SERPL-MCNC: 15 MG/DL — SIGNIFICANT CHANGE UP (ref 7–23)
CALCIUM SERPL-MCNC: 8.7 MG/DL — SIGNIFICANT CHANGE UP (ref 8.4–10.5)
CHLORIDE SERPL-SCNC: 105 MMOL/L — SIGNIFICANT CHANGE UP (ref 96–108)
CK MB BLD-MCNC: 6.5 % — HIGH (ref 0–3.5)
CK MB CFR SERPL CALC: 15.5 NG/ML — HIGH (ref 0–6.7)
CK SERPL-CCNC: 239 U/L — HIGH (ref 30–200)
CO2 SERPL-SCNC: 23 MMOL/L — SIGNIFICANT CHANGE UP (ref 22–31)
CREAT SERPL-MCNC: 0.78 MG/DL — SIGNIFICANT CHANGE UP (ref 0.5–1.3)
EGFR: 95 ML/MIN/1.73M2 — SIGNIFICANT CHANGE UP
EOSINOPHIL # BLD AUTO: 0.03 K/UL — SIGNIFICANT CHANGE UP (ref 0–0.5)
EOSINOPHIL NFR BLD AUTO: 0.3 % — SIGNIFICANT CHANGE UP (ref 0–6)
FIBRINOGEN PPP-MCNC: 197 MG/DL — LOW (ref 200–445)
GAS PNL BLDA: SIGNIFICANT CHANGE UP
GAS PNL BLDV: SIGNIFICANT CHANGE UP
GAS PNL BLDV: SIGNIFICANT CHANGE UP
GLUCOSE BLDC GLUCOMTR-MCNC: 109 MG/DL — HIGH (ref 70–99)
GLUCOSE BLDC GLUCOMTR-MCNC: 119 MG/DL — HIGH (ref 70–99)
GLUCOSE BLDC GLUCOMTR-MCNC: 128 MG/DL — HIGH (ref 70–99)
GLUCOSE BLDC GLUCOMTR-MCNC: 129 MG/DL — HIGH (ref 70–99)
GLUCOSE BLDC GLUCOMTR-MCNC: 130 MG/DL — HIGH (ref 70–99)
GLUCOSE BLDC GLUCOMTR-MCNC: 140 MG/DL — HIGH (ref 70–99)
GLUCOSE BLDC GLUCOMTR-MCNC: 161 MG/DL — HIGH (ref 70–99)
GLUCOSE BLDC GLUCOMTR-MCNC: 174 MG/DL — HIGH (ref 70–99)
GLUCOSE BLDC GLUCOMTR-MCNC: 185 MG/DL — HIGH (ref 70–99)
GLUCOSE BLDC GLUCOMTR-MCNC: 186 MG/DL — HIGH (ref 70–99)
GLUCOSE BLDC GLUCOMTR-MCNC: 92 MG/DL — SIGNIFICANT CHANGE UP (ref 70–99)
GLUCOSE SERPL-MCNC: 192 MG/DL — HIGH (ref 70–99)
HCT VFR BLD CALC: 31.9 % — LOW (ref 39–50)
HGB BLD-MCNC: 10.6 G/DL — LOW (ref 13–17)
IMM GRANULOCYTES NFR BLD AUTO: 0.6 % — SIGNIFICANT CHANGE UP (ref 0–0.9)
INR BLD: 1.13 RATIO — SIGNIFICANT CHANGE UP (ref 0.85–1.16)
LYMPHOCYTES # BLD AUTO: 0.63 K/UL — LOW (ref 1–3.3)
LYMPHOCYTES # BLD AUTO: 7 % — LOW (ref 13–44)
MAGNESIUM SERPL-MCNC: 2.3 MG/DL — SIGNIFICANT CHANGE UP (ref 1.6–2.6)
MCHC RBC-ENTMCNC: 29.5 PG — SIGNIFICANT CHANGE UP (ref 27–34)
MCHC RBC-ENTMCNC: 33.2 G/DL — SIGNIFICANT CHANGE UP (ref 32–36)
MCV RBC AUTO: 88.9 FL — SIGNIFICANT CHANGE UP (ref 80–100)
MONOCYTES # BLD AUTO: 0.45 K/UL — SIGNIFICANT CHANGE UP (ref 0–0.9)
MONOCYTES NFR BLD AUTO: 5 % — SIGNIFICANT CHANGE UP (ref 2–14)
NEUTROPHILS # BLD AUTO: 7.77 K/UL — HIGH (ref 1.8–7.4)
NEUTROPHILS NFR BLD AUTO: 86.8 % — HIGH (ref 43–77)
NRBC # BLD: 0 /100 WBCS — SIGNIFICANT CHANGE UP (ref 0–0)
PHOSPHATE SERPL-MCNC: 3.4 MG/DL — SIGNIFICANT CHANGE UP (ref 2.5–4.5)
PLATELET # BLD AUTO: 111 K/UL — LOW (ref 150–400)
POTASSIUM SERPL-MCNC: 4.2 MMOL/L — SIGNIFICANT CHANGE UP (ref 3.5–5.3)
POTASSIUM SERPL-SCNC: 4.2 MMOL/L — SIGNIFICANT CHANGE UP (ref 3.5–5.3)
PROT SERPL-MCNC: 5.7 G/DL — LOW (ref 6–8.3)
PROTHROM AB SERPL-ACNC: 13 SEC — SIGNIFICANT CHANGE UP (ref 9.9–13.4)
RBC # BLD: 3.59 M/UL — LOW (ref 4.2–5.8)
RBC # FLD: 13.8 % — SIGNIFICANT CHANGE UP (ref 10.3–14.5)
RH IG SCN BLD-IMP: POSITIVE — SIGNIFICANT CHANGE UP
SODIUM SERPL-SCNC: 141 MMOL/L — SIGNIFICANT CHANGE UP (ref 135–145)
TROPONIN T, HIGH SENSITIVITY RESULT: 289 NG/L — HIGH (ref 0–51)
WBC # BLD: 8.96 K/UL — SIGNIFICANT CHANGE UP (ref 3.8–10.5)
WBC # FLD AUTO: 8.96 K/UL — SIGNIFICANT CHANGE UP (ref 3.8–10.5)

## 2024-11-13 PROCEDURE — 33517 CABG ARTERY-VEIN SINGLE: CPT

## 2024-11-13 PROCEDURE — 33267 EXCL LAA OPEN ANY METHOD: CPT

## 2024-11-13 PROCEDURE — 99291 CRITICAL CARE FIRST HOUR: CPT

## 2024-11-13 PROCEDURE — 71045 X-RAY EXAM CHEST 1 VIEW: CPT | Mod: 26

## 2024-11-13 PROCEDURE — 33533 CABG ARTERIAL SINGLE: CPT

## 2024-11-13 PROCEDURE — 93010 ELECTROCARDIOGRAM REPORT: CPT

## 2024-11-13 DEVICE — PACING WIRE BLUE DARK 2-0 24" SH SKS-3: Type: IMPLANTABLE DEVICE | Status: FUNCTIONAL

## 2024-11-13 DEVICE — CANNULA VESSEL 3MM BLUNT TIP CLEAR 1-WAY VALVE: Type: IMPLANTABLE DEVICE | Status: FUNCTIONAL

## 2024-11-13 DEVICE — IMPLANTABLE DEVICE: Type: IMPLANTABLE DEVICE | Status: FUNCTIONAL

## 2024-11-13 DEVICE — CLIP LIG TI SM/WIDE 24/BX: Type: IMPLANTABLE DEVICE | Status: FUNCTIONAL

## 2024-11-13 DEVICE — ATRICLIP LAA EXCLUSION DEVICE 50MM FLEX-V: Type: IMPLANTABLE DEVICE | Status: FUNCTIONAL

## 2024-11-13 DEVICE — CANNULA VENOUS 2 STAGE THIN FLEX 36/46FR X 1/2" WITH RETURN DIAL: Type: IMPLANTABLE DEVICE | Status: FUNCTIONAL

## 2024-11-13 DEVICE — CANNULA IMA 1MM BLUNT TIP: Type: IMPLANTABLE DEVICE | Status: FUNCTIONAL

## 2024-11-13 DEVICE — CHEST DRAIN THORACIC ARGYLE PVC 28FR RIGHT ANGLE: Type: IMPLANTABLE DEVICE | Status: FUNCTIONAL

## 2024-11-13 DEVICE — CANNULA ARTERIAL STRAIGHT 20FR X 3/8" VENTED: Type: IMPLANTABLE DEVICE | Status: FUNCTIONAL

## 2024-11-13 DEVICE — CANNULA ANTEGRADE CARDIOPLEGIA 14 GA STRL: Type: IMPLANTABLE DEVICE | Status: FUNCTIONAL

## 2024-11-13 DEVICE — KIT CVC 2LUM MAC 9FR CHG: Type: IMPLANTABLE DEVICE | Status: FUNCTIONAL

## 2024-11-13 DEVICE — KIT A-LINE 1LUM 20G X 12CM SAFE KIT: Type: IMPLANTABLE DEVICE | Status: FUNCTIONAL

## 2024-11-13 DEVICE — LIGATING CLIPS WECK HORIZON MEDIUM (BLUE) 24: Type: IMPLANTABLE DEVICE | Status: FUNCTIONAL

## 2024-11-13 DEVICE — CHEST DRAIN THORACIC ARGYLE PVC 36FR STRAIGHT: Type: IMPLANTABLE DEVICE | Status: FUNCTIONAL

## 2024-11-13 RX ORDER — CHLORHEXIDINE GLUCONATE 1.2 MG/ML
1 RINSE ORAL DAILY
Refills: 0 | Status: DISCONTINUED | OUTPATIENT
Start: 2024-11-13 | End: 2024-11-16

## 2024-11-13 RX ORDER — B-COMPLEX WITH VITAMIN C
1 VIAL (ML) INJECTION
Refills: 0 | DISCHARGE

## 2024-11-13 RX ORDER — LOSARTAN POTASSIUM 25 MG/1
1 TABLET ORAL
Refills: 0 | DISCHARGE

## 2024-11-13 RX ORDER — DEXMEDETOMIDINE HYDROCHLORIDE 4 UG/ML
1.5 INJECTION, SOLUTION INTRAVENOUS
Qty: 200 | Refills: 0 | Status: DISCONTINUED | OUTPATIENT
Start: 2024-11-13 | End: 2024-11-14

## 2024-11-13 RX ORDER — AMIODARONE HYDROCHLORIDE 200 MG/1
400 TABLET ORAL
Refills: 0 | Status: COMPLETED | OUTPATIENT
Start: 2024-11-13 | End: 2024-11-16

## 2024-11-13 RX ORDER — SENNOSIDES 8.6 MG
2 TABLET ORAL AT BEDTIME
Refills: 0 | Status: DISCONTINUED | OUTPATIENT
Start: 2024-11-14 | End: 2024-11-18

## 2024-11-13 RX ORDER — POLYETHYLENE GLYCOL 3350 17 G/17G
17 POWDER, FOR SOLUTION ORAL DAILY
Refills: 0 | Status: DISCONTINUED | OUTPATIENT
Start: 2024-11-14 | End: 2024-11-18

## 2024-11-13 RX ORDER — POTASSIUM CHLORIDE 600 MG/1
10 TABLET, EXTENDED RELEASE ORAL
Refills: 0 | Status: DISCONTINUED | OUTPATIENT
Start: 2024-11-13 | End: 2024-11-15

## 2024-11-13 RX ORDER — FAMOTIDINE 10 MG/ML
1 INJECTION INTRAVENOUS
Refills: 0 | DISCHARGE

## 2024-11-13 RX ORDER — CHLORHEXIDINE GLUCONATE 1.2 MG/ML
15 RINSE ORAL EVERY 12 HOURS
Refills: 0 | Status: DISCONTINUED | OUTPATIENT
Start: 2024-11-13 | End: 2024-11-13

## 2024-11-13 RX ORDER — ATENOLOL 100 MG
1 TABLET ORAL
Refills: 0 | DISCHARGE

## 2024-11-13 RX ORDER — INSULIN REG, HUM S-S BUFF 100/ML
3 VIAL (ML) INJECTION
Qty: 100 | Refills: 0 | Status: DISCONTINUED | OUTPATIENT
Start: 2024-11-13 | End: 2024-11-14

## 2024-11-13 RX ORDER — CHLORHEXIDINE GLUCONATE 1.2 MG/ML
1 RINSE ORAL ONCE
Refills: 0 | Status: DISCONTINUED | OUTPATIENT
Start: 2024-11-13 | End: 2024-11-13

## 2024-11-13 RX ORDER — 0.9 % SODIUM CHLORIDE 0.9 %
250 INTRAVENOUS SOLUTION INTRAVENOUS ONCE
Refills: 0 | Status: COMPLETED | OUTPATIENT
Start: 2024-11-13 | End: 2024-11-13

## 2024-11-13 RX ORDER — MULTIVIT WITH MINERALS/LUTEIN
500 TABLET ORAL
Refills: 0 | Status: COMPLETED | OUTPATIENT
Start: 2024-11-13 | End: 2024-11-18

## 2024-11-13 RX ORDER — GABAPENTIN 300 MG/1
200 CAPSULE ORAL ONCE
Refills: 0 | Status: COMPLETED | OUTPATIENT
Start: 2024-11-13 | End: 2024-11-13

## 2024-11-13 RX ORDER — SODIUM CHLORIDE 9 MG/ML
1000 INJECTION, SOLUTION INTRAMUSCULAR; INTRAVENOUS; SUBCUTANEOUS
Refills: 0 | Status: DISCONTINUED | OUTPATIENT
Start: 2024-11-13 | End: 2024-11-16

## 2024-11-13 RX ORDER — LANOLIN ALCOHOL/MO/W.PET/CERES
100 CREAM (GRAM) TOPICAL DAILY
Refills: 0 | Status: DISCONTINUED | OUTPATIENT
Start: 2024-11-13 | End: 2024-11-18

## 2024-11-13 RX ORDER — LIDOCAINE HCL 20 MG/ML
0.2 VIAL (ML) INJECTION ONCE
Refills: 0 | Status: DISCONTINUED | OUTPATIENT
Start: 2024-11-13 | End: 2024-11-13

## 2024-11-13 RX ORDER — HYDROMORPHONE HYDROCHLORIDE 2 MG/1
0.5 TABLET ORAL ONCE
Refills: 0 | Status: DISCONTINUED | OUTPATIENT
Start: 2024-11-13 | End: 2024-11-13

## 2024-11-13 RX ORDER — CHLORHEXIDINE GLUCONATE 1.2 MG/ML
15 RINSE ORAL EVERY 12 HOURS
Refills: 0 | Status: DISCONTINUED | OUTPATIENT
Start: 2024-11-13 | End: 2024-11-15

## 2024-11-13 RX ORDER — GABAPENTIN 300 MG/1
1 CAPSULE ORAL
Refills: 0 | DISCHARGE

## 2024-11-13 RX ORDER — GLUCOSAMINE SULFATE DIPOT CHLR 500 MG
1 CAPSULE ORAL DAILY
Refills: 0 | Status: DISCONTINUED | OUTPATIENT
Start: 2024-11-13 | End: 2024-11-18

## 2024-11-13 RX ORDER — GABAPENTIN 300 MG/1
100 CAPSULE ORAL EVERY 8 HOURS
Refills: 0 | Status: COMPLETED | OUTPATIENT
Start: 2024-11-13 | End: 2024-11-18

## 2024-11-13 RX ORDER — FOLIC ACID 1 MG/1
1 TABLET ORAL
Refills: 0 | DISCHARGE

## 2024-11-13 RX ORDER — EZETIMIBE 10 MG/1
1 TABLET ORAL
Refills: 0 | DISCHARGE

## 2024-11-13 RX ORDER — HYDROMORPHONE HYDROCHLORIDE 2 MG/1
0.5 TABLET ORAL EVERY 6 HOURS
Refills: 0 | Status: DISCONTINUED | OUTPATIENT
Start: 2024-11-13 | End: 2024-11-13

## 2024-11-13 RX ORDER — OXYCODONE HYDROCHLORIDE 30 MG/1
5 TABLET ORAL EVERY 4 HOURS
Refills: 0 | Status: DISCONTINUED | OUTPATIENT
Start: 2024-11-13 | End: 2024-11-18

## 2024-11-13 RX ORDER — ACETAMINOPHEN 500MG 500 MG/1
650 TABLET, COATED ORAL EVERY 6 HOURS
Refills: 0 | Status: DISCONTINUED | OUTPATIENT
Start: 2024-11-16 | End: 2024-11-18

## 2024-11-13 RX ORDER — IPRATROPIUM BROMIDE AND ALBUTEROL SULFATE 2.5; .5 MG/3ML; MG/3ML
3 SOLUTION RESPIRATORY (INHALATION) EVERY 6 HOURS
Refills: 0 | Status: DISCONTINUED | OUTPATIENT
Start: 2024-11-13 | End: 2024-11-18

## 2024-11-13 RX ORDER — INFLUENZA VIRUS VACCINE 15; 15; 15; 15 UG/.5ML; UG/.5ML; UG/.5ML; UG/.5ML
0.5 SUSPENSION INTRAMUSCULAR ONCE
Refills: 0 | Status: DISCONTINUED | OUTPATIENT
Start: 2024-11-13 | End: 2024-11-18

## 2024-11-13 RX ORDER — PANTOPRAZOLE SODIUM 40 MG/1
1 TABLET, DELAYED RELEASE ORAL
Refills: 0 | DISCHARGE

## 2024-11-13 RX ORDER — PANTOPRAZOLE SODIUM 40 MG/1
40 TABLET, DELAYED RELEASE ORAL DAILY
Refills: 0 | Status: DISCONTINUED | OUTPATIENT
Start: 2024-11-13 | End: 2024-11-14

## 2024-11-13 RX ORDER — SODIUM CHLORIDE 9 MG/ML
3 INJECTION, SOLUTION INTRAMUSCULAR; INTRAVENOUS; SUBCUTANEOUS EVERY 8 HOURS
Refills: 0 | Status: DISCONTINUED | OUTPATIENT
Start: 2024-11-13 | End: 2024-11-13

## 2024-11-13 RX ORDER — 0.9 % SODIUM CHLORIDE 0.9 %
500 INTRAVENOUS SOLUTION INTRAVENOUS ONCE
Refills: 0 | Status: COMPLETED | OUTPATIENT
Start: 2024-11-13 | End: 2024-11-13

## 2024-11-13 RX ORDER — MEPERIDINE HYDROCHLORIDE 100 MG/2ML
25 INJECTION, SOLUTION INTRAMUSCULAR; INTRAVENOUS; SUBCUTANEOUS ONCE
Refills: 0 | Status: DISCONTINUED | OUTPATIENT
Start: 2024-11-13 | End: 2024-11-14

## 2024-11-13 RX ORDER — ACETAMINOPHEN 500MG 500 MG/1
650 TABLET, COATED ORAL EVERY 6 HOURS
Refills: 0 | Status: COMPLETED | OUTPATIENT
Start: 2024-11-13 | End: 2024-11-16

## 2024-11-13 RX ORDER — OXYCODONE HYDROCHLORIDE 30 MG/1
10 TABLET ORAL EVERY 4 HOURS
Refills: 0 | Status: DISCONTINUED | OUTPATIENT
Start: 2024-11-13 | End: 2024-11-18

## 2024-11-13 RX ADMIN — Medication 3 UNIT(S)/HR: at 19:15

## 2024-11-13 RX ADMIN — Medication 3 UNIT(S)/HR: at 14:49

## 2024-11-13 RX ADMIN — HYDROMORPHONE HYDROCHLORIDE 0.5 MILLIGRAM(S): 2 TABLET ORAL at 21:15

## 2024-11-13 RX ADMIN — Medication 81 MILLIGRAM(S): at 21:00

## 2024-11-13 RX ADMIN — CHLORHEXIDINE GLUCONATE 15 MILLILITER(S): 1.2 RINSE ORAL at 18:39

## 2024-11-13 RX ADMIN — GABAPENTIN 100 MILLIGRAM(S): 300 CAPSULE ORAL at 21:00

## 2024-11-13 RX ADMIN — Medication 2000 MILLILITER(S): at 14:50

## 2024-11-13 RX ADMIN — GABAPENTIN 200 MILLIGRAM(S): 300 CAPSULE ORAL at 06:59

## 2024-11-13 RX ADMIN — Medication 750 MILLILITER(S): at 20:15

## 2024-11-13 RX ADMIN — HYDROMORPHONE HYDROCHLORIDE 0.5 MILLIGRAM(S): 2 TABLET ORAL at 21:00

## 2024-11-13 RX ADMIN — HYDROMORPHONE HYDROCHLORIDE 0.5 MILLIGRAM(S): 2 TABLET ORAL at 17:50

## 2024-11-13 RX ADMIN — DEXMEDETOMIDINE HYDROCHLORIDE 34.5 MICROGRAM(S)/KG/HR: 4 INJECTION, SOLUTION INTRAVENOUS at 14:49

## 2024-11-13 RX ADMIN — SODIUM CHLORIDE 10 MILLILITER(S): 9 INJECTION, SOLUTION INTRAMUSCULAR; INTRAVENOUS; SUBCUTANEOUS at 19:15

## 2024-11-13 RX ADMIN — HYDROMORPHONE HYDROCHLORIDE 0.5 MILLIGRAM(S): 2 TABLET ORAL at 17:35

## 2024-11-13 RX ADMIN — Medication 100 MILLIGRAM(S): at 21:00

## 2024-11-13 RX ADMIN — SODIUM CHLORIDE 10 MILLILITER(S): 9 INJECTION, SOLUTION INTRAMUSCULAR; INTRAVENOUS; SUBCUTANEOUS at 14:50

## 2024-11-13 NOTE — PATIENT PROFILE ADULT - TRANSPORTATION
Dr Hardin at bedside talking to parents about surgery findings, post op care, and follow up care  
Dr Prakash at bedside talking to parents post anesthesia  
no

## 2024-11-13 NOTE — AIRWAY REMOVAL NOTE  ADULT & PEDS - ARTIFICAL AIRWAY REMOVAL COMMENTS
Written order for extubation verified. The patient was identified by full name and birth date compared to the identification band. Present during the procedure was micaela rn

## 2024-11-13 NOTE — PRE-ANESTHESIA EVALUATION ADULT - NSANTHPMHFT_GEN_ALL_CORE
74M PMH HTN, HLD, pAF, GERD, ETOH abuse, cocaine use 30 years ago.  Normal biventricular function, mild aortic root dilatation.

## 2024-11-13 NOTE — BRIEF OPERATIVE NOTE - NSICDXBRIEFPROCEDURE_GEN_ALL_CORE_FT
PROCEDURES:  CABG, 1 vessel, using LIMA 13-Nov-2024 13:41:34  Jw FISCHER  CABG, using 1 vein graft 13-Nov-2024 13:41:56  Jw FISCHER   PROCEDURES:  CABG, 1 vessel, using LIMA 13-Nov-2024 13:41:34  Jw FISCHER  CABG, using 1 vein graft 13-Nov-2024 13:41:56  Jw FISCHER  Clipping, left atrial appendage 13-Nov-2024 13:47:24  Jw FISCHER

## 2024-11-13 NOTE — PRE-ANESTHESIA EVALUATION ADULT - RESPIRATORY RATE (BREATHS/MIN)
Physical Therapy Treatment    Patient Name:  Laureen Strickland   MRN:  [id number]    Recommendations:     Discharge Recommendations: home with home health, home  Discharge Equipment Recommendations: none  Barriers to discharge:  none    Assessment:     Laureen Strickland is a 81 y.o. female admitted with a medical diagnosis of <principal problem not specified>.  She presents with the following impairments/functional limitations: weakness, impaired functional mobility, impaired endurance, decreased lower extremity function .    Rehab Prognosis: Good; patient would benefit from acute skilled PT services to address these deficits and reach maximum level of function.    Recent Surgery: * No surgery found *      Plan:     During this hospitalization, patient to be seen daily to address the identified rehab impairments via gait training, therapeutic activities, therapeutic exercises and progress toward the following goals:    Plan of Care Expires:  05/19/23    Subjective     Chief Complaint: weakness and poor endurance  Patient/Family Comments/goals: improve strength and endurance for tf and gait.  Pain/Comfort:  Pain Rating 1: 0/10      Objective:     Communicated with ndg prior to session.  Patient found supine with   upon PT entry to room.     General Precautions: Standard, fall  Orthopedic Precautions: N/A  Braces: N/A  Respiratory Status: Room air     Functional Mobility:  Bed Mobility:     Rolling Left:  contact guard assistance  Transfers:     Sit to Stand:  contact guard assistance with hand-held assist  Gait: pt ambulated 150' with HHA and vc's for increased step length.      AM-PAC 6 CLICK MOBILITY  Turning over in bed (including adjusting bedclothes, sheets and blankets)?: 3  Sitting down on and standing up from a chair with arms (e.g., wheelchair, bedside commode, etc.): 3  Moving from lying on back to sitting on the side of the bed?: 3  Moving to and from a bed to a chair (including a wheelchair)?: 3  Need to  walk in hospital room?: 3  Climbing 3-5 steps with a railing?: 1  Basic Mobility Total Score: 16       Treatment & Education:      Patient left up in chair with call button in reach..    GOALS:   Multidisciplinary Problems       Physical Therapy Goals          Problem: Physical Therapy    Goal Priority Disciplines Outcome Goal Variances Interventions   Physical Therapy Goal     PT, PT/OT Ongoing, Progressing     Description: Goals to be met by: discharge     Patient will increase functional independence with mobility by performin. Sit to stand transfer with Modified [place]  2. Gait  x 150 feet with Modified [place] using No Assistive Device.                          Time Tracking:     PT Received On: 23  PT Start Time: 0645     PT Stop Time: 0700  PT Total Time (min): 15 min     Billable Minutes: Gait Training 15    Treatment Type: Treatment  PT/PTA: PTA     Number of PTA visits since last PT visit: 3     2023   16

## 2024-11-13 NOTE — BRIEF OPERATIVE NOTE - NSICDXBRIEFPREOP_GEN_ALL_CORE_FT
PRE-OP DIAGNOSIS:  CAD (coronary artery disease) 13-Nov-2024 13:42:22  Jw FISCHER  Chronic atrial fibrillation 13-Nov-2024 13:43:44  Jw FISCHER

## 2024-11-13 NOTE — PROGRESS NOTE ADULT - SUBJECTIVE AND OBJECTIVE BOX
Medicare Wellness Visit  Plan for Preventive Care    A good way for you to stay healthy is to use preventive care.  Medicare covers many services that can help you stay healthy.* The goal of these services is to find any health problems as quickly as possible. Finding problems early can help make them easier to treat.  Your personal plan below lists the services you may need and when they are due.      Health Maintenance Summary     Medicare Advantage- Medicare Wellness Visit (Yearly - January to December)  Overdue since 1/1/2022    Breast Cancer Screening (Every 2 Years)  Ordered on 11/1/2022    COVID-19 Vaccine (1)  Postponed until 11/2/2022    Shingles Vaccine (1 of 2)  Postponed until 12/13/2022    Influenza Vaccine (1)  Postponed until 6/30/2023    Hepatitis B Vaccine (1 of 3 - 3-dose series)  Postponed until 11/1/2023    Depression Screening (Yearly)  Next due on 11/1/2023    DTaP/Tdap/Td Vaccine (2 - Td or Tdap)  Next due on 1/30/2028    Colorectal Cancer Screen- (Colonoscopy - Every 10 Years)  Next due on 2/15/2028    Hepatitis C Screening   Completed    Osteoporosis Screening   Ordered on 11/1/2022    Pneumococcal Vaccine 65+   Completed    Meningococcal Vaccine   Aged Out    HPV Vaccine   Aged Out           Preventive Care for Women and Men    Heart Screenings (Cardiovascular):  · Blood tests are used to check your cholesterol, lipid and triglyceride levels. High levels can increase your risk for heart disease and stroke. High levels can be treated with medications, diet and exercise. Lowering your levels can help keep your heart and blood vessels healthy.  Your provider will order these tests if they are needed.    · An ultrasound is done to see if you have an abdominal aortic aneurysm (AAA).  This is an enlargement of one of the main blood vessels that delivers blood to the body.   In the [place], 9,000 deaths are caused by AAA.  You may not even know you have this problem and as many as 1 in 3  Patient seen and examined at the bedside.    Remained critically ill on continuous ICU monitoring.    OBJECTIVE:  Vital Signs Last 24 Hrs  T(C): 36.4 (13 Nov 2024 16:00), Max: 36.5 (13 Nov 2024 05:35)  T(F): 97.5 (13 Nov 2024 16:00), Max: 97.7 (13 Nov 2024 05:35)  HR: 77 (13 Nov 2024 17:00) (58 - 77)  BP: 167/99 (13 Nov 2024 08:50) (167/99 - 167/99)  BP(mean): --  RR: 14 (13 Nov 2024 17:00) (12 - 16)  SpO2: 100% (13 Nov 2024 17:00) (97% - 100%)    Parameters below as of 13 Nov 2024 16:00  Patient On (Oxygen Delivery Method): ventilator    O2 Concentration (%): 40      Physical Exam:  General: intubated multiple lines gtt & tubes   Neurology: sedated   Eyes: bilateral pupils equal and reactive   ENT/Neck: +ETT midline, Neck supple, trachea midline, No JVD   Respiratory: Rales noted bilaterally   CV: RRR, S1S2, no murmurs        [x] Sternal dressing, [x] Mediastinal CT, [x] L. Pleural CT         [x] Sinus rhythm, [x] Temporary pacing - VVI 40  Abdominal: Soft, NT, ND +BS,   Extremities: 1-2+ pedal edema noted, + peripheral pulses   Skin: No Rashes, Hematoma, Ecchymosis                           Assessment:  73 yo male presents to PST prior to scheduled CABG x 3 on 11/13/24 with Dr. Laci Ervin. Pmhx HTN, HLD, Aneurysm of aortic root, AFib (not on AC), GERD, asthma (denies inhaler use), fatty liver, alcohol abuse (reports 4 glasses of wine/nightly for past 10 years; previously abstinent w/ help of AA; hx of cocaine abuse 30 years ago)    CAD s/p C2L, LAAC on 11/13/24  Hypovolemia  Post op respiratory insufficiency  Acute blood loss anemia  Thrombocytopenia  Stress hyperglycemia  Plan:   ***Neuro***  [x] Sedated with [x] Precedex   Post operative neuro assessment   Tylenol, Gabapentin, Meperidine, PRN Oxycodone, and PRN Dilaudid for pain management.    ***Cardiovascular***  Invasive hemodynamic monitoring, assess perfusion indices   SR / CVP 3 / MAP 75 / SvO2 98.9% / Hct 31.9% / Lactate 1.3  Reassessment of hemodynamics post resuscitation   Monitor chest tube outputs   Amiodarone for Afib prophylaxis   [x] Nonbleeding   [x] ASA   Serial EKG and cardiac enzymes     ***Pulmonary***  Intubated postop, placed on Vent  Post op vent management  Titration of FiO2 and PEEP, follow SpO2, CXR, blood gases     Mode: AC/ CMV (Assist Control/ Continuous Mandatory Ventilation)  RR (machine): 12  TV (machine): 550  FiO2: 40  PEEP: 5  ITime: 1  MAP: 7  PIP: 15              Will plan to wean & extubate once pt is hemodynamically stable and not bleeding    ***GI***  NPO for now. Advance diet as tolerated  [x] Protonix    ***Renal***  GFR 95  Continue to monitor I/Os, BUN/Creatinine.   Replete lytes PRN  Holm present [x] positive     ***ID***  Perioperative antibiotics     ***Endocrine***  [x] Hyperglycemia : HbA1c 6.1%              - [x] Insulin gtt             - Need tight glycemic control to prevent wound infection.            Patient requires continuous monitoring with bedside rhythm monitoring, pulse oximetry monitoring, and continuous central venous and arterial pressure monitoring; and intermittent blood gas analysis. Care plan discussed with the ICU care team.   Patient remained critical, at risk for life threatening decompensation.    I have spent 30 minutes providing critical care management to this patient.    By signing my name below, I, Rach Chao, attest that this documentation has been prepared under the direction and in the presence of Alexandra Astorga MD   Electronically signed: Canelo Carrion, 11-13-24 @ 17:34    I, Alexandra Astorga, personally performed the services described in this documentation. all medical record entries made by the canelo were at my direction and in my presence. I have reviewed the chart and agree that the record reflects my personal performance and is accurate and complete  Electronically signed: Alexandra Astorga MD  people will have a serious problem if it is not treated.  Early diagnosis allows for more effective treatment and cure.  If you have a family history of AAA or are a male age 65-75 who has smoked, you are at higher risk of an AAA.  Your provider can order this test, if needed.    Colorectal Screening:  · There are many tests that are used to check for cancer of your colon and rectum. You and your provider should discuss what test is best for you and when to have it done.  Options include:  · Screening Colonoscopy: exam of the entire colon, seen through a flexible lighted tube.  · Flexible Sigmoidoscopy: exam of the last third (sigmoid portion) of the colon and rectum, seen through a flexible lighted tube.  · Cologuard DNA stool test: a sample of your stool is used to screen for cancer and unseen blood in your stool.  · Fecal Occult Blood Test: a sample of your stool is studied to find any unseen blood    Flu Shot:  · An immunization that helps to prevent influenza (the flu). You should get this every year. The best time to get the shot is in the fall.    Pneumococcal Shot:  • Vaccines help prevent pneumococcal disease, which is any type of illness caused by Streptococcus pneumoniae bacteria. There are two kinds of pneumococcal vaccines available in the [place]:   o Pneumococcal conjugate vaccines (PCV20 or Agjkrnb58®)  o Pneumococcal polysaccharide vaccine (PPSV23 or Dimavebhe80®)  · For those who have never received any pneumococcal conjugate vaccine, CDC recommends PVC20 for adults 65 years or older and adults 19 through 64 years old with certain medical conditions or risk factors.   · For those who have previously received PCV13, this should be followed by a dose of PPSV23.     Hepatitis B Shot:  · An immunization that helps to protect people from getting Hepatitis B. Hepatitis B is a virus that spreads through contact with infected blood or body fluids. Many people with the virus do not have symptoms.  The  Patient seen and examined at the bedside.    Remained critically ill on continuous ICU monitoring.    OBJECTIVE:  Vital Signs Last 24 Hrs  T(C): 36.4 (13 Nov 2024 16:00), Max: 36.5 (13 Nov 2024 05:35)  T(F): 97.5 (13 Nov 2024 16:00), Max: 97.7 (13 Nov 2024 05:35)  HR: 77 (13 Nov 2024 17:00) (58 - 77)  BP: 167/99 (13 Nov 2024 08:50) (167/99 - 167/99)  BP(mean): --  RR: 14 (13 Nov 2024 17:00) (12 - 16)  SpO2: 100% (13 Nov 2024 17:00) (97% - 100%)    Parameters below as of 13 Nov 2024 16:00  Patient On (Oxygen Delivery Method): ventilator    O2 Concentration (%): 40      Physical Exam:  General: intubated multiple lines gtt & tubes   Neurology: sedated   Eyes: bilateral pupils equal and reactive   ENT/Neck: +ETT midline, Neck supple, trachea midline, No JVD   Respiratory: Rales noted bilaterally   CV: RRR, S1S2, no murmurs        [x] Sternal dressing, [x] Mediastinal CT, [x] L. Pleural CT         [x] Sinus rhythm, [x] Temporary pacing - VVI 40  Abdominal: Soft, NT, ND +BS,   Extremities: 1-2+ pedal edema noted, + peripheral pulses   Skin: No Rashes, Hematoma, Ecchymosis                           Assessment:  75 yo male presents to PST prior to scheduled CABG x 3 on 11/13/24 with Dr. Laci Ervin. Pmhx HTN, HLD, Aneurysm of aortic root, AFib (not on AC), GERD, asthma (denies inhaler use), fatty liver, alcohol abuse (reports 4 glasses of wine/nightly for past 10 years; previously abstinent w/ help of AA; hx of cocaine abuse 30 years ago)    CAD s/p C2L, LAAC on 11/13/24  Hypovolemia  Post op respiratory insufficiency  Acute blood loss anemia  Thrombocytopenia  Stress hyperglycemia  Plan:   ***Neuro***  [x] Sedated with [x] Precedex   Post operative neuro assessment   Tylenol, Gabapentin, Meperidine, PRN Oxycodone, and PRN Dilaudid for pain management.    ***Cardiovascular***  Invasive hemodynamic monitoring, assess perfusion indices   SR / CVP 3 / MAP 75 / SvO2 98.9% / Hct 31.9% / Lactate 1.3  Reassessment of hemodynamics post resuscitation   Monitor chest tube outputs   Amiodarone for Afib prophylaxis   [x] Nonbleeding   [x] ASA   Serial EKG and cardiac enzymes   RIJ intro and RRAL placed today (11/13)    ***Pulmonary***  Intubated postop, placed on Vent  Post op vent management  Titration of FiO2 and PEEP, follow SpO2, CXR, blood gases     Mode: AC/ CMV (Assist Control/ Continuous Mandatory Ventilation)  RR (machine): 12  TV (machine): 550  FiO2: 40  PEEP: 5  ITime: 1  MAP: 7  PIP: 15              Will plan to wean & extubate once pt is hemodynamically stable and not bleeding    ***GI***  NPO for now. Advance diet as tolerated  [x] Protonix    ***Renal***  GFR 95  Continue to monitor I/Os, BUN/Creatinine.   Replete lytes PRN  Holm present [x] positive     ***ID***  Perioperative antibiotics     ***Endocrine***  [x] Hyperglycemia : HbA1c 6.1%              - [x] Insulin gtt             - Need tight glycemic control to prevent wound infection.            Patient requires continuous monitoring with bedside rhythm monitoring, pulse oximetry monitoring, and continuous central venous and arterial pressure monitoring; and intermittent blood gas analysis. Care plan discussed with the ICU care team.   Patient remained critical, at risk for life threatening decompensation.    I have spent 30 minutes providing critical care management to this patient.    By signing my name below, I, Rach Chao, attest that this documentation has been prepared under the direction and in the presence of Alexandra Astorga MD   Electronically signed: Livia Carrion, 11-13-24 @ 17:34    I, Alexandra Astorga, personally performed the services described in this documentation. all medical record entries made by the sarahibpedro were at my direction and in my presence. I have reviewed the chart and agree that the record reflects my personal performance and is accurate and complete  Electronically signed: Alexandra Astorga MD  virus can lead to serious problems, such as liver disease. Some people are at higher risk than others. Your doctor will tell you if you need this shot.     Diabetes Screening:  · A test to measure sugar (glucose) in your blood is called a fasting blood sugar. Fasting means you cannot have food or drink for at least 8 hours before the test. This test can detect diabetes long before you may notice symptoms.    Glaucoma Screening:  · Glaucoma screening is performed by your eye doctor. The test measures the fluid pressure inside your eyes to determine if you have glaucoma.     Hepatitis C Screening:  · A blood test to see if you have the hepatitis C virus.  Hepatitis C attacks the liver and is a major cause of chronic liver disease.  Medicare will cover a single screening for all adults born between 1945 & 1965, or high risk patients (people who have injected illegal drugs or people who have had blood transfusions).  High risk patients who continue to inject illegal drugs can be screened for Hepatitis C every year.    Smoking and Tobacco-Use Cessation Counseling:  · Tobacco is the single greatest cause of disease and early death in our country today. Medication and counseling together can increase a person’s chance of quitting for good.   · Medicare covers two quitting attempts per year, with four counseling sessions per attempt (eight sessions in a 12 month period)    Preventive Screening tests for Women    Screening Mammograms and Breast Exams:  · An x-ray of your breasts to check for breast cancer before you or your doctor may be able to feel it.  If breast cancer is found early it can usually be treated with success.    Pelvic Exams and Pap Tests:  · An exam to check for cervical and vaginal cancer. A Pap test is a lab test in which cells are taken from your cervix and sent to the lab to look for signs of cervical cancer. If cancer of the cervix is found early, chances for a cure are good. Testing can generally end  Patient seen and examined at the bedside.    Remained critically ill on continuous ICU monitoring.    OBJECTIVE:  Vital Signs Last 24 Hrs  T(C): 36.4 (13 Nov 2024 16:00), Max: 36.5 (13 Nov 2024 05:35)  T(F): 97.5 (13 Nov 2024 16:00), Max: 97.7 (13 Nov 2024 05:35)  HR: 77 (13 Nov 2024 17:00) (58 - 77)  BP: 167/99 (13 Nov 2024 08:50) (167/99 - 167/99)  BP(mean): --  RR: 14 (13 Nov 2024 17:00) (12 - 16)  SpO2: 100% (13 Nov 2024 17:00) (97% - 100%)    Parameters below as of 13 Nov 2024 16:00  Patient On (Oxygen Delivery Method): ventilator    O2 Concentration (%): 40      Physical Exam:  General: NAD  Neurology: sedated   Eyes: bilateral pupils equal and reactive   ENT/Neck: Neck supple, trachea midline, No JVD   Respiratory: Rales noted bilaterally   CV: RRR, S1S2, no murmurs        [x] Sternal dressing, [x] Mediastinal CT, [x] L. Pleural CT         [x] Sinus rhythm, [x] Temporary pacing - VVI 40  Abdominal: Soft, NT, ND +BS,   Extremities: 1-2+ pedal edema noted, + peripheral pulses   Skin: No Rashes, Hematoma, Ecchymosis                           Assessment:  73 yo male presents to PST prior to scheduled CABG x 3 on 11/13/24 with Dr. Laci Ervin. Pmhx HTN, HLD, Aneurysm of aortic root, AFib (not on AC), GERD, asthma (denies inhaler use), fatty liver, alcohol abuse (reports 4 glasses of wine/nightly for past 10 years; previously abstinent w/ help of AA; hx of cocaine abuse 30 years ago)    CAD s/p C2L, LAAC on 11/13/24  Hypovolemia  Post op respiratory insufficiency  Acute blood loss anemia  Thrombocytopenia  Stress hyperglycemia  Plan:   ***Neuro***  [x] Sedated with [x] Precedex   Post operative neuro assessment   Tylenol, Gabapentin, Meperidine, PRN Oxycodone, and PRN Dilaudid for pain management.    ***Cardiovascular***  Invasive hemodynamic monitoring, assess perfusion indices   SR / CVP 3 / MAP 75 / SvO2 98.9% / Hct 31.9% / Lactate 1.3  Reassessment of hemodynamics post resuscitation   Monitor chest tube outputs   Amiodarone for Afib prophylaxis   [x] Nonbleeding   [x] ASA   Serial EKG and cardiac enzymes   RIJ intro and RRAL placed today (11/13)  TEEdone today (11/13), f/u results    ***Pulmonary***  Intubated postop, placed on Vent -- Now Extubated (11/13)  Post op vent management  Titration of FiO2 and PEEP, follow SpO2, CXR, blood gases     Mode: AC/ CMV (Assist Control/ Continuous Mandatory Ventilation)  RR (machine): 12  TV (machine): 550  FiO2: 40  PEEP: 5  ITime: 1  MAP: 7  PIP: 15              Will plan to wean & extubate once pt is hemodynamically stable and not bleeding    ***GI***  NPO for now. Advance diet as tolerated  [x] Protonix    ***Renal***  GFR 95  Continue to monitor I/Os, BUN/Creatinine.   Replete lytes PRN  Holm present [x] positive     ***ID***  Perioperative antibiotics     ***Endocrine***  [x] Hyperglycemia : HbA1c 6.1%              - [x] Insulin gtt             - Need tight glycemic control to prevent wound infection.            Patient requires continuous monitoring with bedside rhythm monitoring, pulse oximetry monitoring, and continuous central venous and arterial pressure monitoring; and intermittent blood gas analysis. Care plan discussed with the ICU care team.   Patient remained critical, at risk for life threatening decompensation.    I have spent 30 minutes providing critical care management to this patient.    By signing my name below, I, Rach Chao, attest that this documentation has been prepared under the direction and in the presence of Alexandra Astorga MD   Electronically signed: Canelo Carrion, 11-13-24 @ 17:34    I, Alexandra Astorga, personally performed the services described in this documentation. all medical record entries made by the canelo were at my direction and in my presence. I have reviewed the chart and agree that the record reflects my personal performance and is accurate and complete  Electronically signed: Alexandra Astorga MD  at age 65, or if a woman has a hysterectomy for a benign condition. Your provider may recommend more frequent testing if certain abnormal results are found.    Bone Mass Measurements:  · A painless x-ray of your bone density to see if you are at risk for a broken bone. Bone density refers to the thickness of bones or how tightly the bone tissue is packed.    Preventive Screening tests for Men    Prostate Screening:  · Should you have a prostate cancer test (PSA)?  It is up to you to decide if you want a prostate cancer test. Talk to your clinician to find out if the test is right for you.  Things for you to consider and talk about should include:  · Benefits and harms of the test  · Your family history  · How your race/ethnicity may influence the test  · If the test may impact other medical conditions you have  · Your values on screenings and treatments    *Medicare pays for many preventive services to keep you healthy. For some of these services, you might have to pay a deductible, coinsurance, and / or copayment.  The amounts vary depending on the type of services you need and the kind of Medicare health plan you have.    For further details on screenings offered by Medicare please visit: https://www.medicare.gov/coverage/preventive-screening-services    Patient seen and examined at the bedside.    Remained critically ill on continuous ICU monitoring.    POD#0 from CABG x 2, LAAC. Normal BiV function  Minimal chest tube outputs, received 750ml crystalloids, No blood transfusion intra or postop.  Extubated in ICU, weaned to nasal cannula.    OBJECTIVE:  Vital Signs Last 24 Hrs  T(C): 36.4 (13 Nov 2024 16:00), Max: 36.5 (13 Nov 2024 05:35)  T(F): 97.5 (13 Nov 2024 16:00), Max: 97.7 (13 Nov 2024 05:35)  HR: 77 (13 Nov 2024 17:00) (58 - 77)  BP: 167/99 (13 Nov 2024 08:50) (167/99 - 167/99)  BP(mean): --  RR: 14 (13 Nov 2024 17:00) (12 - 16)  SpO2: 100% (13 Nov 2024 17:00) (97% - 100%)    Parameters below as of 13 Nov 2024 16:00  Patient On (Oxygen Delivery Method): ventilator    O2 Concentration (%): 40    _________________________  VITAL SIGNS:  Vital Signs Last 24 Hrs  T(C): 37.1 (13 Nov 2024 20:00), Max: 37.1 (13 Nov 2024 20:00)  T(F): 98.8 (13 Nov 2024 20:00), Max: 98.8 (13 Nov 2024 20:00)  HR: 69 (13 Nov 2024 21:00) (58 - 77)  BP: 167/99 (13 Nov 2024 08:50) (167/99 - 167/99)  BP(mean): --  RR: 12 (13 Nov 2024 21:00) (11 - 16)  SpO2: 96% (13 Nov 2024 21:00) (96% - 100%)    Parameters below as of 13 Nov 2024 20:00  Patient On (Oxygen Delivery Method): nasal cannula  O2 Flow (L/min): 3    I/Os:   I&O's Detail    13 Nov 2024 07:01  -  13 Nov 2024 22:03  --------------------------------------------------------  IN:    Dexmedetomidine: 108.6 mL    Insulin: 25 mL    IV PiggyBack: 50 mL    Lactated Ringers Bolus: 550 mL    Oral Fluid: 150 mL    sodium chloride 0.9%: 90 mL  Total IN: 973.6 mL    OUT:    Chest Tube (mL): 10 mL    Chest Tube (mL): 165 mL    Indwelling Catheter - Urethral (mL): 635 mL  Total OUT: 810 mL    Total NET: 163.6 mL          Mode: CPAP with PS  FiO2: 40  PEEP: 5  PS: 5  ITime: 1  MAP: 8  PIP: 12      MEDICATIONS:  MEDICATIONS  (STANDING):  acetaminophen     Tablet .. 650 milliGRAM(s) Oral every 6 hours  aMIOdarone    Tablet 400 milliGRAM(s) Oral two times a day  ascorbic acid 500 milliGRAM(s) Oral two times a day  aspirin enteric coated 81 milliGRAM(s) Oral daily  aspirin Suppository 300 milliGRAM(s) Rectal once  cefuroxime  IVPB 1500 milliGRAM(s) IV Intermittent every 8 hours  chlorhexidine 0.12% Liquid 15 milliLiter(s) Oral Mucosa every 12 hours  chlorhexidine 2% Cloths 1 Application(s) Topical daily  dexMEDEtomidine Infusion 1.5 MICROgram(s)/kG/Hr (34.5 mL/Hr) IV Continuous <Continuous>  dextrose 50% Injectable 50 milliLiter(s) IV Push every 15 minutes  dextrose 50% Injectable 25 milliLiter(s) IV Push every 15 minutes  folic acid 1 milliGRAM(s) Oral daily  gabapentin 100 milliGRAM(s) Oral every 8 hours  influenza  Vaccine (HIGH DOSE) 0.5 milliLiter(s) IntraMuscular once  insulin regular Infusion 3 Unit(s)/Hr (3 mL/Hr) IV Continuous <Continuous>  meperidine     Injectable 25 milliGRAM(s) IV Push once  pantoprazole  Injectable 40 milliGRAM(s) IV Push daily  potassium chloride  10 mEq/50 mL IVPB 10 milliEquivalent(s) IV Intermittent every 1 hour  potassium chloride  10 mEq/50 mL IVPB 10 milliEquivalent(s) IV Intermittent every 1 hour  potassium chloride  10 mEq/50 mL IVPB 10 milliEquivalent(s) IV Intermittent every 1 hour  sodium chloride 0.9%. 1000 milliLiter(s) (10 mL/Hr) IV Continuous <Continuous>  thiamine 100 milliGRAM(s) Oral daily    MEDICATIONS  (PRN):  albuterol/ipratropium for Nebulization 3 milliLiter(s) Nebulizer every 6 hours PRN Bronchospasm  HYDROmorphone  Injectable 0.5 milliGRAM(s) IV Push every 6 hours PRN Breakthrough Pain  oxyCODONE    IR 10 milliGRAM(s) Oral every 4 hours PRN Severe Pain (7 - 10)  oxyCODONE    IR 5 milliGRAM(s) Oral every 4 hours PRN Moderate Pain (4 - 6)      LABS:  Laboratory data was independently reviewed by me today.                           10.6   8.96  )-----------( 111      ( 13 Nov 2024 13:43 )             31.9     11-13    141  |  105  |  15  ----------------------------<  192[H]  4.2   |  23  |  0.78    Ca    8.7      13 Nov 2024 13:42  Phos  3.4     11-13  Mg     2.3     11-13    TPro  5.7[L]  /  Alb  3.7  /  TBili  0.8  /  DBili  x   /  AST  24  /  ALT  13  /  AlkPhos  68  11-13    LIVER FUNCTIONS - ( 13 Nov 2024 13:42 )  Alb: 3.7 g/dL / Pro: 5.7 g/dL / ALK PHOS: 68 U/L / ALT: 13 U/L / AST: 24 U/L / GGT: x           PT/INR - ( 13 Nov 2024 13:43 )   PT: 13.0 sec;   INR: 1.13 ratio         PTT - ( 13 Nov 2024 13:43 )  PTT:30.0 sec  ABG - ( 13 Nov 2024 19:15 )  pH, Arterial: 7.37  pH, Blood: x     /  pCO2: 41    /  pO2: 114   / HCO3: 24    / Base Excess: -1.5  /  SaO2: 99.5              Urinalysis Basic - ( 13 Nov 2024 13:42 )    Color: x / Appearance: x / SG: x / pH: x  Gluc: 192 mg/dL / Ketone: x  / Bili: x / Urobili: x   Blood: x / Protein: x / Nitrite: x   Leuk Esterase: x / RBC: x / WBC x   Sq Epi: x / Non Sq Epi: x / Bacteria: x        RADIOLOGY:   Radiology images were independently reviewed by me today. Reports were reviewed by me today.    Xray Chest 1 View- PORTABLE-Routine:   ACC: [id number] EXAM:  XR CHEST PORTABLE ROUTINE 1V   ORDERED BY: ZOILA ARAUJO     PROCEDURE DATE:  11/13/2024          INTERPRETATION:  Chest one view    HISTORY: Postop    COMPARISON STUDY: 11/7/2024    Frontal expiratory view of the chest shows the heart to be borderline in   size. Endotracheal tube, right jugular catheter, sternal wires,   nasogastric tube, left atrial appendage clip, mediastinal drain and left   chest tube are present.    The lungs show mild central congestion with mild left base atelectasis   and small left base pneumothorax. There is no evidence of pleural   effusion.    IMPRESSION:  Mild congestion. Small left base pneumothorax.        Thank you for the courtesy of this referral.    --- End of Report ---            ELSY KEY MD; Attending Interventional Radiologist  This document has been electronically signed. Nov 13 2024  4:23PM (11-13-24 @ 13:11)    Physical Exam:  General: NAD  Neurology: sedated   Eyes: bilateral pupils equal and reactive   ENT/Neck: Neck supple, trachea midline, No JVD   Respiratory: Rales noted bilaterally   CV: RRR, S1S2, no murmurs        [x] Sternal dressing, [x] Mediastinal CT, [x] L. Pleural CT         [x] Sinus rhythm, [x] Temporary pacing - VVI 40  Abdominal: Soft, NT, ND +BS,   Extremities: NO pedal edema noted, + peripheral pulses   Skin: No Rashes, Hematoma, Ecchymosis                           Assessment:  75 yo male presents to PST prior to scheduled CABG x 3 on 11/13/24 with Dr. Zoila Araujo. Pmhx HTN, HLD, Aneurysm of aortic root, AFib (not on AC), GERD, asthma (denies inhaler use), fatty liver, alcohol abuse (reports 4 glasses of wine/nightly for past 10 years; previously abstinent w/ help of AA; hx of cocaine abuse 30 years ago)    CAD s/p C2L, LAAC on 11/13/24  Hypovolemia  Post op respiratory insufficiency  Acute blood loss anemia  Thrombocytopenia  Stress hyperglycemia      Plan:   ***Neuro***  [x] Sedated with [x] Precedex   Post operative neuro assessment   Tylenol, Gabapentin, Meperidine, PRN Oxycodone, and PRN Dilaudid for pain management.    ***Cardiovascular***  Invasive hemodynamic monitoring, assess perfusion indices   SR / CVP 3 / MAP 75 / SvO2 98.9% / Hct 31.9% / Lactate 1.3  Reassessment of hemodynamics post resuscitation   Monitor chest tube outputs   Amiodarone for Afib prophylaxis   [x] Nonbleeding   [x] ASA   Serial EKG and cardiac enzymes   RIJ intro and RRAL placed today (11/13)  TEEdone today (11/13), f/u results    ***Pulmonary***  Intubated postop, placed on Vent -- Now Extubated (11/13)  Post op vent management  Titration of FiO2 and PEEP, follow SpO2, CXR, blood gases     Mode: AC/ CMV (Assist Control/ Continuous Mandatory Ventilation)  RR (machine): 12  TV (machine): 550  FiO2: 40  PEEP: 5  ITime: 1  MAP: 7  PIP: 15              Will plan to wean & extubate once pt is hemodynamically stable and not bleeding    ***GI***  NPO for now. Advance diet as tolerated  [x] Protonix    ***Renal***  GFR 95  Continue to monitor I/Os, BUN/Creatinine.   Replete lytes PRN  Holm present [x] positive     ***ID***  Perioperative antibiotics     ***Endocrine***  [x] Hyperglycemia : HbA1c 6.1%              - [x] Insulin gtt             - Need tight glycemic control to prevent wound infection.            Patient requires continuous monitoring with bedside rhythm monitoring, pulse oximetry monitoring, and continuous central venous and arterial pressure monitoring; and intermittent blood gas analysis. Care plan discussed with the ICU care team.   Patient remained critical, at risk for life threatening decompensation.    I have spent 40 minutes providing critical care management to this patient.    By signing my name below, I, Rach Chao, attest that this documentation has been prepared under the direction and in the presence of Alexandra Astorga MD   Electronically signed: Livia Carrion, 11-13-24 @ 17:34    I, Alexandra Astorga, personally performed the services described in this documentation. all medical record entries made by the sarahibpedro were at my direction and in my presence. I have reviewed the chart and agree that the record reflects my personal performance and is accurate and complete  Electronically signed: Alexandra Astorga MD

## 2024-11-14 LAB
ALBUMIN SERPL ELPH-MCNC: 3.7 G/DL — SIGNIFICANT CHANGE UP (ref 3.3–5)
ALP SERPL-CCNC: 65 U/L — SIGNIFICANT CHANGE UP (ref 40–120)
ALT FLD-CCNC: 15 U/L — SIGNIFICANT CHANGE UP (ref 10–45)
ANION GAP SERPL CALC-SCNC: 13 MMOL/L — SIGNIFICANT CHANGE UP (ref 5–17)
ANISOCYTOSIS BLD QL: SLIGHT — SIGNIFICANT CHANGE UP
AST SERPL-CCNC: 29 U/L — SIGNIFICANT CHANGE UP (ref 10–40)
BASOPHILS # BLD AUTO: 0 K/UL — SIGNIFICANT CHANGE UP (ref 0–0.2)
BASOPHILS NFR BLD AUTO: 0 % — SIGNIFICANT CHANGE UP (ref 0–2)
BILIRUB SERPL-MCNC: 0.5 MG/DL — SIGNIFICANT CHANGE UP (ref 0.2–1.2)
BLD GP AB SCN SERPL QL: NEGATIVE — SIGNIFICANT CHANGE UP
BUN SERPL-MCNC: 16 MG/DL — SIGNIFICANT CHANGE UP (ref 7–23)
CALCIUM SERPL-MCNC: 9 MG/DL — SIGNIFICANT CHANGE UP (ref 8.4–10.5)
CHLORIDE SERPL-SCNC: 106 MMOL/L — SIGNIFICANT CHANGE UP (ref 96–108)
CO2 SERPL-SCNC: 20 MMOL/L — LOW (ref 22–31)
CREAT SERPL-MCNC: 0.94 MG/DL — SIGNIFICANT CHANGE UP (ref 0.5–1.3)
EGFR: 86 ML/MIN/1.73M2 — SIGNIFICANT CHANGE UP
EOSINOPHIL # BLD AUTO: 0 K/UL — SIGNIFICANT CHANGE UP (ref 0–0.5)
EOSINOPHIL NFR BLD AUTO: 0 % — SIGNIFICANT CHANGE UP (ref 0–6)
GAS PNL BLDA: SIGNIFICANT CHANGE UP
GAS PNL BLDV: SIGNIFICANT CHANGE UP
GIANT PLATELETS BLD QL SMEAR: PRESENT — SIGNIFICANT CHANGE UP
GLUCOSE BLDC GLUCOMTR-MCNC: 112 MG/DL — HIGH (ref 70–99)
GLUCOSE BLDC GLUCOMTR-MCNC: 128 MG/DL — HIGH (ref 70–99)
GLUCOSE BLDC GLUCOMTR-MCNC: 145 MG/DL — HIGH (ref 70–99)
GLUCOSE BLDC GLUCOMTR-MCNC: 146 MG/DL — HIGH (ref 70–99)
GLUCOSE BLDC GLUCOMTR-MCNC: 147 MG/DL — HIGH (ref 70–99)
GLUCOSE BLDC GLUCOMTR-MCNC: 154 MG/DL — HIGH (ref 70–99)
GLUCOSE BLDC GLUCOMTR-MCNC: 202 MG/DL — HIGH (ref 70–99)
GLUCOSE SERPL-MCNC: 136 MG/DL — HIGH (ref 70–99)
HCT VFR BLD CALC: 32.9 % — LOW (ref 39–50)
HGB BLD-MCNC: 10.8 G/DL — LOW (ref 13–17)
LYMPHOCYTES # BLD AUTO: 0.32 K/UL — LOW (ref 1–3.3)
LYMPHOCYTES # BLD AUTO: 3.5 % — LOW (ref 13–44)
MACROCYTES BLD QL: SLIGHT — SIGNIFICANT CHANGE UP
MAGNESIUM SERPL-MCNC: 2.2 MG/DL — SIGNIFICANT CHANGE UP (ref 1.6–2.6)
MANUAL SMEAR VERIFICATION: SIGNIFICANT CHANGE UP
MCHC RBC-ENTMCNC: 29.4 PG — SIGNIFICANT CHANGE UP (ref 27–34)
MCHC RBC-ENTMCNC: 32.8 G/DL — SIGNIFICANT CHANGE UP (ref 32–36)
MCV RBC AUTO: 89.6 FL — SIGNIFICANT CHANGE UP (ref 80–100)
MONOCYTES # BLD AUTO: 0.4 K/UL — SIGNIFICANT CHANGE UP (ref 0–0.9)
MONOCYTES NFR BLD AUTO: 4.3 % — SIGNIFICANT CHANGE UP (ref 2–14)
NEUTROPHILS # BLD AUTO: 8.38 K/UL — HIGH (ref 1.8–7.4)
NEUTROPHILS NFR BLD AUTO: 81.9 % — HIGH (ref 43–77)
NEUTS BAND # BLD: 8.6 % — HIGH (ref 0–8)
PHOSPHATE SERPL-MCNC: 4.4 MG/DL — SIGNIFICANT CHANGE UP (ref 2.5–4.5)
PLAT MORPH BLD: NORMAL — SIGNIFICANT CHANGE UP
PLATELET # BLD AUTO: 111 K/UL — LOW (ref 150–400)
PLATELET COUNT - ESTIMATE: ABNORMAL
POTASSIUM SERPL-MCNC: 4.5 MMOL/L — SIGNIFICANT CHANGE UP (ref 3.5–5.3)
POTASSIUM SERPL-SCNC: 4.5 MMOL/L — SIGNIFICANT CHANGE UP (ref 3.5–5.3)
PROT SERPL-MCNC: 6.1 G/DL — SIGNIFICANT CHANGE UP (ref 6–8.3)
RBC # BLD: 3.67 M/UL — LOW (ref 4.2–5.8)
RBC # FLD: 13.9 % — SIGNIFICANT CHANGE UP (ref 10.3–14.5)
RBC BLD AUTO: SIGNIFICANT CHANGE UP
RH IG SCN BLD-IMP: POSITIVE — SIGNIFICANT CHANGE UP
SODIUM SERPL-SCNC: 139 MMOL/L — SIGNIFICANT CHANGE UP (ref 135–145)
VARIANT LYMPHS # BLD: 1.7 % — SIGNIFICANT CHANGE UP (ref 0–6)
WBC # BLD: 9.26 K/UL — SIGNIFICANT CHANGE UP (ref 3.8–10.5)
WBC # FLD AUTO: 9.26 K/UL — SIGNIFICANT CHANGE UP (ref 3.8–10.5)

## 2024-11-14 PROCEDURE — 99291 CRITICAL CARE FIRST HOUR: CPT

## 2024-11-14 PROCEDURE — 93010 ELECTROCARDIOGRAM REPORT: CPT

## 2024-11-14 PROCEDURE — 71045 X-RAY EXAM CHEST 1 VIEW: CPT | Mod: 26

## 2024-11-14 RX ORDER — 0.9 % SODIUM CHLORIDE 0.9 %
500 INTRAVENOUS SOLUTION INTRAVENOUS ONCE
Refills: 0 | Status: COMPLETED | OUTPATIENT
Start: 2024-11-14 | End: 2024-11-14

## 2024-11-14 RX ORDER — METOPROLOL TARTRATE 100 MG/1
25 TABLET, FILM COATED ORAL
Refills: 0 | Status: DISCONTINUED | OUTPATIENT
Start: 2024-11-14 | End: 2024-11-16

## 2024-11-14 RX ORDER — METOPROLOL TARTRATE 100 MG/1
25 TABLET, FILM COATED ORAL
Refills: 0 | Status: DISCONTINUED | OUTPATIENT
Start: 2024-11-14 | End: 2024-11-14

## 2024-11-14 RX ORDER — HYDROMORPHONE HYDROCHLORIDE 2 MG/1
0.5 TABLET ORAL ONCE
Refills: 0 | Status: DISCONTINUED | OUTPATIENT
Start: 2024-11-14 | End: 2024-11-14

## 2024-11-14 RX ORDER — SODIUM CHLORIDE 9 MG/ML
3 INJECTION, SOLUTION INTRAMUSCULAR; INTRAVENOUS; SUBCUTANEOUS EVERY 8 HOURS
Refills: 0 | Status: DISCONTINUED | OUTPATIENT
Start: 2024-11-14 | End: 2024-11-18

## 2024-11-14 RX ORDER — ENOXAPARIN SODIUM 30 MG/.3ML
40 INJECTION SUBCUTANEOUS EVERY 24 HOURS
Refills: 0 | Status: DISCONTINUED | OUTPATIENT
Start: 2024-11-14 | End: 2024-11-18

## 2024-11-14 RX ORDER — HYDRALAZINE HYDROCHLORIDE 10 MG/1
5 TABLET ORAL ONCE
Refills: 0 | Status: COMPLETED | OUTPATIENT
Start: 2024-11-14 | End: 2024-11-14

## 2024-11-14 RX ORDER — PANTOPRAZOLE SODIUM 40 MG/1
40 TABLET, DELAYED RELEASE ORAL
Refills: 0 | Status: DISCONTINUED | OUTPATIENT
Start: 2024-11-14 | End: 2024-11-18

## 2024-11-14 RX ADMIN — HYDROMORPHONE HYDROCHLORIDE 0.5 MILLIGRAM(S): 2 TABLET ORAL at 00:30

## 2024-11-14 RX ADMIN — ACETAMINOPHEN 500MG 650 MILLIGRAM(S): 500 TABLET, COATED ORAL at 11:00

## 2024-11-14 RX ADMIN — ACETAMINOPHEN 500MG 650 MILLIGRAM(S): 500 TABLET, COATED ORAL at 05:30

## 2024-11-14 RX ADMIN — SODIUM CHLORIDE 3 MILLILITER(S): 9 INJECTION, SOLUTION INTRAMUSCULAR; INTRAVENOUS; SUBCUTANEOUS at 21:21

## 2024-11-14 RX ADMIN — Medication 80 MILLIGRAM(S): at 21:41

## 2024-11-14 RX ADMIN — POLYETHYLENE GLYCOL 3350 17 GRAM(S): 17 POWDER, FOR SOLUTION ORAL at 11:06

## 2024-11-14 RX ADMIN — AMIODARONE HYDROCHLORIDE 400 MILLIGRAM(S): 200 TABLET ORAL at 17:07

## 2024-11-14 RX ADMIN — ENOXAPARIN SODIUM 40 MILLIGRAM(S): 30 INJECTION SUBCUTANEOUS at 11:05

## 2024-11-14 RX ADMIN — AMIODARONE HYDROCHLORIDE 400 MILLIGRAM(S): 200 TABLET ORAL at 06:08

## 2024-11-14 RX ADMIN — HYDROMORPHONE HYDROCHLORIDE 0.5 MILLIGRAM(S): 2 TABLET ORAL at 01:00

## 2024-11-14 RX ADMIN — Medication 3000 MILLILITER(S): at 00:30

## 2024-11-14 RX ADMIN — Medication 500 MILLIGRAM(S): at 05:30

## 2024-11-14 RX ADMIN — GABAPENTIN 100 MILLIGRAM(S): 300 CAPSULE ORAL at 05:30

## 2024-11-14 RX ADMIN — SODIUM CHLORIDE 3 MILLILITER(S): 9 INJECTION, SOLUTION INTRAMUSCULAR; INTRAVENOUS; SUBCUTANEOUS at 13:26

## 2024-11-14 RX ADMIN — ACETAMINOPHEN 500MG 650 MILLIGRAM(S): 500 TABLET, COATED ORAL at 18:32

## 2024-11-14 RX ADMIN — Medication 4: at 11:06

## 2024-11-14 RX ADMIN — Medication 100 MILLIGRAM(S): at 05:00

## 2024-11-14 RX ADMIN — CHLORHEXIDINE GLUCONATE 15 MILLILITER(S): 1.2 RINSE ORAL at 17:07

## 2024-11-14 RX ADMIN — OXYCODONE HYDROCHLORIDE 10 MILLIGRAM(S): 30 TABLET ORAL at 08:05

## 2024-11-14 RX ADMIN — ACETAMINOPHEN 500MG 650 MILLIGRAM(S): 500 TABLET, COATED ORAL at 17:07

## 2024-11-14 RX ADMIN — Medication 2 TABLET(S): at 21:41

## 2024-11-14 RX ADMIN — Medication 100 MILLIGRAM(S): at 21:48

## 2024-11-14 RX ADMIN — GABAPENTIN 100 MILLIGRAM(S): 300 CAPSULE ORAL at 13:42

## 2024-11-14 RX ADMIN — OXYCODONE HYDROCHLORIDE 10 MILLIGRAM(S): 30 TABLET ORAL at 22:30

## 2024-11-14 RX ADMIN — Medication 81 MILLIGRAM(S): at 11:05

## 2024-11-14 RX ADMIN — Medication 100 MILLIGRAM(S): at 11:06

## 2024-11-14 RX ADMIN — OXYCODONE HYDROCHLORIDE 10 MILLIGRAM(S): 30 TABLET ORAL at 08:35

## 2024-11-14 RX ADMIN — ACETAMINOPHEN 500MG 650 MILLIGRAM(S): 500 TABLET, COATED ORAL at 11:30

## 2024-11-14 RX ADMIN — OXYCODONE HYDROCHLORIDE 10 MILLIGRAM(S): 30 TABLET ORAL at 21:41

## 2024-11-14 RX ADMIN — HYDRALAZINE HYDROCHLORIDE 5 MILLIGRAM(S): 10 TABLET ORAL at 06:00

## 2024-11-14 RX ADMIN — OXYCODONE HYDROCHLORIDE 10 MILLIGRAM(S): 30 TABLET ORAL at 16:23

## 2024-11-14 RX ADMIN — ACETAMINOPHEN 500MG 650 MILLIGRAM(S): 500 TABLET, COATED ORAL at 06:00

## 2024-11-14 RX ADMIN — Medication 100 MILLIGRAM(S): at 11:05

## 2024-11-14 RX ADMIN — Medication 1 MILLIGRAM(S): at 11:05

## 2024-11-14 RX ADMIN — METOPROLOL TARTRATE 25 MILLIGRAM(S): 100 TABLET, FILM COATED ORAL at 08:17

## 2024-11-14 RX ADMIN — GABAPENTIN 100 MILLIGRAM(S): 300 CAPSULE ORAL at 21:41

## 2024-11-14 RX ADMIN — METOPROLOL TARTRATE 25 MILLIGRAM(S): 100 TABLET, FILM COATED ORAL at 17:07

## 2024-11-14 RX ADMIN — OXYCODONE HYDROCHLORIDE 10 MILLIGRAM(S): 30 TABLET ORAL at 17:20

## 2024-11-14 RX ADMIN — Medication 500 MILLIGRAM(S): at 17:07

## 2024-11-14 NOTE — PHYSICAL THERAPY INITIAL EVALUATION ADULT - GENERAL OBSERVATIONS, REHAB EVAL
Pt tolerated 40 min PT evaluation well, received OOB seated in chair, A&OX4, following commands, pleasant & eager to participate, s/p CABG, left atrial appendage clip (11/13), +chest tube x2, +external pacer, +supplemental 02 via NC, +ISABEL, +ICU monitoring, sternal precautions reviewed & maintained. Pt completed sit-stand with RW, amb 50ftx2 with RW & CGA. Pt left seated in chair, NAD, purposeful proactive rounds reinforced, all lines intact, VSS, RN Olivia aware & present to assist.

## 2024-11-14 NOTE — PHYSICAL THERAPY INITIAL EVALUATION ADULT - PERTINENT HX OF CURRENT PROBLEM, REHAB EVAL
Pt is 74M admitted 11/13/24 presents to PST prior to scheduled CABG x 3 on 11/13/24 with Dr. Laci Ervin. Pmhx HTN, HLD, Aneurysm of aortic root, AFib (not on AC), GERD, asthma (denies inhaler use), fatty liver, alcohol abuse (reports 4 glasses of wine/nightly for past 10 years; previously abstinent w/ help of AA; hx of cocaine abuse 30 years ago). Poor historian. Went for routine cardiology visit, found to have abnormal stress test. Cardiac cath performed on 10/24/24 revealed severe distal LM stenosis/multi-vessel disease.Endorses occasional chest pain, dizziness and SALEEM w/ moderate exertion. Denies fever, chills, syncope, falls. Recently evaluated by Dr. Ervin and above surgery was recommended.

## 2024-11-14 NOTE — OCCUPATIONAL THERAPY INITIAL EVALUATION ADULT - MD/RN NOTIFIED
Subjective   HPI  Sachi Morris is a 34 y.o. female who is here for 10-day complaint of sinus pressure, sinus tenderness, ear fullness, postnasal drip.  She denies fevers, chills, shortness of breath, cough, abdominal pain, nausea, vomiting, diarrhea, chest pain.  She has tried taking DayQuil which has not helped very much and Mucinex which has helped during some of her sinuses, however she does not feel like it has fully resolved.  Denies any underlying lung pathology or history of recurrent sinus infections.      Review of Systems   Constitutional:  Negative for chills and fever.   HENT:  Positive for congestion, ear pain, postnasal drip, rhinorrhea, sinus pressure and sinus pain. Negative for ear discharge and sore throat.    Respiratory:  Negative for choking, chest tightness, shortness of breath and wheezing.    Cardiovascular:  Negative for chest pain and palpitations.   Gastrointestinal:  Negative for abdominal pain, constipation, diarrhea, nausea and vomiting.   Neurological:  Negative for dizziness, light-headedness and headaches.   All other systems reviewed and are negative.      Objective   /86 (BP Location: Right arm, Patient Position: Sitting)   Temp 36.8 °C (98.3 °F)   Wt 77.7 kg (171 lb 3.2 oz)   BMI 24.56 kg/m²     Physical Exam  Vitals reviewed.   Constitutional:       General: She is not in acute distress.     Appearance: Normal appearance. She is not toxic-appearing.   HENT:      Head: Normocephalic and atraumatic.      Left Ear: Tympanic membrane normal.      Ears:      Comments: Right TM fullness, no purulence or erythema     Nose: Rhinorrhea present.      Right Sinus: Maxillary sinus tenderness present. No frontal sinus tenderness.      Left Sinus: Maxillary sinus tenderness present. No frontal sinus tenderness.      Mouth/Throat:      Mouth: Mucous membranes are moist.      Pharynx: No oropharyngeal exudate or posterior oropharyngeal erythema.   Eyes:      Extraocular Movements:  Extraocular movements intact.   Cardiovascular:      Rate and Rhythm: Normal rate and regular rhythm.      Heart sounds: No murmur heard.     No friction rub. No gallop.   Pulmonary:      Effort: Pulmonary effort is normal. No respiratory distress.      Breath sounds: Normal breath sounds. No wheezing, rhonchi or rales.   Musculoskeletal:      Cervical back: Normal range of motion. No tenderness.   Skin:     General: Skin is warm and dry.   Neurological:      General: No focal deficit present.      Mental Status: She is alert.   Psychiatric:         Mood and Affect: Mood normal.         Behavior: Behavior normal.         Assessment/Plan   Problem List Items Addressed This Visit    None  Visit Diagnoses       Acute non-recurrent sinusitis, unspecified location    -  Primary    Relevant Medications    amoxicillin-pot clavulanate (Augmentin) 875-125 mg tablet    cetirizine (ZyrTEC) 10 mg tablet    fluticasone (Flonase) 50 mcg/actuation nasal spray    ipratropium (Atrovent) 21 mcg (0.03 %) nasal spray             yes

## 2024-11-14 NOTE — OCCUPATIONAL THERAPY INITIAL EVALUATION ADULT - LEVEL OF INDEPENDENCE: DRESS LOWER BODY, OT EVAL
educated on how to LB dressing while maintaining sternal precautions. pt with understanding/independent

## 2024-11-14 NOTE — PROGRESS NOTE ADULT - ASSESSMENT
75 yo male presents to [facility] prior to scheduled CABG x 3 on 11/13/24 with Dr. Laci Ervin. Pmhx HTN, HLD, Aneurysm of aortic root, AFib (not on AC), GERD, asthma (denies inhaler use), fatty liver, alcohol abuse (reports 4 glasses of wine/nightly for past 10 years; previously abstinent w/ help of AA; hx of cocaine abuse 30 years ago). Poor historian. Went for routine cardiology visit, found to have abnormal stress test. Cardiac cath performed on 10/24/24 revealed severe distal LM stenosis/multi-vessel disease.  Endorses occasional chest pain, dizziness and SALEEM w/ moderate exertion. Denies fever, chills, syncope, falls. Recently evaluated by Dr. Ervin and above surgery was recommended.  11/13 S/P C2L/JENIFER clip, central line and marley d/c'brendan. angela d/c'd, transfer to floor 75 yo male presents to [facility] prior to scheduled CABG x 3 on 11/13/24 with Dr. aLci Ervin. Pmhx HTN, HLD, Aneurysm of aortic root, AFib (not on AC), GERD, asthma (denies inhaler use), fatty liver, alcohol abuse (reports 4 glasses of wine/nightly for past 10 years; previously abstinent w/ help of AA; hx of cocaine abuse 30 years ago). Poor historian. Went for routine cardiology visit, found to have abnormal stress test. Cardiac cath performed on 10/24/24 revealed severe distal LM stenosis/multi-vessel disease.  Endorses occasional chest pain, dizziness and SALEEM w/ moderate exertion. Denies fever, chills, syncope, falls. Recently evaluated by Dr. Ervin and above surgery was recommended.  11/13 S/P C2L/JENIFER clip,  11/14  central line and marley d/c'brendan. angela d/c'd, transfer to floor

## 2024-11-14 NOTE — PHYSICAL THERAPY INITIAL EVALUATION ADULT - GAIT DEVIATIONS NOTED, PT EVAL
decreased philippe/increased time in double stance/decreased velocity of limb motion/decreased step length/decreased stride length/decreased weight-shifting ability

## 2024-11-14 NOTE — PROGRESS NOTE ADULT - SUBJECTIVE AND OBJECTIVE BOX
Patient seen and examined at the bedside.    Remains critically ill on continuous ICU monitoring, at risk for life threatening decompensation.  All Labs, data reviewed. Plan of care discussed in length during multi-disciplinary ICU rounds.       Brief Summary:  73 yo male presents to PST prior to scheduled CABG x 3 on 11/13/24 with Dr. Laci Ervin. Pmhx HTN, HLD, Aneurysm of aortic root, AFib (not on AC), GERD, asthma (denies inhaler use), fatty liver, alcohol abuse (reports 4 glasses of wine/nightly for past 10 years; previously abstinent w/ help of AA; hx of cocaine abuse 30 years ago). Poor historian. Went for routine cardiology visit, found to have abnormal stress test. Cardiac cath performed on 10/24/24 revealed severe distal LM stenosis/multi-vessel disease.  POD# 1 from CABG x 2, LAAC. Normal BiV function    24 Hour events:  Minimal chest tube outputs, received 750ml crystalloids, No blood transfusion intra or postop.  Extubated in ICU, weaned to nasal cannula.    Objective:  Vital Signs Last 24 Hrs  T(C): 37.1 (14 Nov 2024 04:00), Max: 37.2 (14 Nov 2024 00:00)  T(F): 98.8 (14 Nov 2024 04:00), Max: 99 (14 Nov 2024 00:00)  HR: 79 (14 Nov 2024 07:00) (58 - 79)  BP: 136/82 (14 Nov 2024 05:00) (128/78 - 167/99)  BP(mean): 104 (14 Nov 2024 05:00) (98 - 118)  RR: 30 (14 Nov 2024 07:00) (11 - 30)  SpO2: 97% (14 Nov 2024 07:00) (96% - 100%)    Parameters below as of 14 Nov 2024 04:00  Patient On (Oxygen Delivery Method): nasal cannula  O2 Flow (L/min): 3      Mode: CPAP with PS  FiO2: 40  PEEP: 5  PS: 5  ITime: 1  MAP: 8  PIP: 12              Physical Exam:   General: Alert and interactive   Neurology: Arousable, able to follow commands   Respiratory: Bilateral breast sounds   CV: Sinus   Abdominal: Soft, Nontender  Extremities: Warm, well-perfused  -------------------------------------------------------------------------------------------------------------------------------    Labs:                        10.8   9.26  )-----------( 111      ( 14 Nov 2024 00:49 )             32.9     11-14    139  |  106  |  16  ----------------------------<  136[H]  4.5   |  20[L]  |  0.94    Ca    9.0      14 Nov 2024 00:50  Phos  4.4     11-14  Mg     2.2     11-14    TPro  6.1  /  Alb  3.7  /  TBili  0.5  /  DBili  x   /  AST  29  /  ALT  15  /  AlkPhos  65  11-14    LIVER FUNCTIONS - ( 14 Nov 2024 00:50 )  Alb: 3.7 g/dL / Pro: 6.1 g/dL / ALK PHOS: 65 U/L / ALT: 15 U/L / AST: 29 U/L / GGT: x           PT/INR - ( 13 Nov 2024 13:43 )   PT: 13.0 sec;   INR: 1.13 ratio         PTT - ( 13 Nov 2024 13:43 )  PTT:30.0 sec  ABG - ( 14 Nov 2024 00:12 )  pH, Arterial: 7.36  pH, Blood: x     /  pCO2: 38    /  pO2: 107   / HCO3: 22    / Base Excess: -3.6  /  SaO2: 99.1                  ALL MEDICATIONS   MEDICATIONS  (STANDING):  acetaminophen     Tablet .. 650 milliGRAM(s) Oral every 6 hours  aMIOdarone    Tablet 400 milliGRAM(s) Oral two times a day  ascorbic acid 500 milliGRAM(s) Oral two times a day  aspirin enteric coated 81 milliGRAM(s) Oral daily  aspirin Suppository 300 milliGRAM(s) Rectal once  cefuroxime  IVPB 1500 milliGRAM(s) IV Intermittent every 8 hours  chlorhexidine 0.12% Liquid 15 milliLiter(s) Oral Mucosa every 12 hours  chlorhexidine 2% Cloths 1 Application(s) Topical daily  dexMEDEtomidine Infusion 1.5 MICROgram(s)/kG/Hr (34.5 mL/Hr) IV Continuous <Continuous>  dextrose 50% Injectable 25 milliLiter(s) IV Push every 15 minutes  dextrose 50% Injectable 50 milliLiter(s) IV Push every 15 minutes  folic acid 1 milliGRAM(s) Oral daily  gabapentin 100 milliGRAM(s) Oral every 8 hours  influenza  Vaccine (HIGH DOSE) 0.5 milliLiter(s) IntraMuscular once  insulin regular Infusion 3 Unit(s)/Hr (3 mL/Hr) IV Continuous <Continuous>  meperidine     Injectable 25 milliGRAM(s) IV Push once  pantoprazole  Injectable 40 milliGRAM(s) IV Push daily  polyethylene glycol 3350 17 Gram(s) Oral daily  potassium chloride  10 mEq/50 mL IVPB 10 milliEquivalent(s) IV Intermittent every 1 hour  potassium chloride  10 mEq/50 mL IVPB 10 milliEquivalent(s) IV Intermittent every 1 hour  potassium chloride  10 mEq/50 mL IVPB 10 milliEquivalent(s) IV Intermittent every 1 hour  senna 2 Tablet(s) Oral at bedtime  sodium chloride 0.9%. 1000 milliLiter(s) (10 mL/Hr) IV Continuous <Continuous>  thiamine 100 milliGRAM(s) Oral daily    MEDICATIONS  (PRN):  albuterol/ipratropium for Nebulization 3 milliLiter(s) Nebulizer every 6 hours PRN Bronchospasm  HYDROmorphone  Injectable 0.5 milliGRAM(s) IV Push every 6 hours PRN Breakthrough Pain  oxyCODONE    IR 10 milliGRAM(s) Oral every 4 hours PRN Severe Pain (7 - 10)  oxyCODONE    IR 5 milliGRAM(s) Oral every 4 hours PRN Moderate Pain (4 - 6)    ------------------------------------------------------------------------------------------------------------------------------  Assessment:  73 yo male presents to PST prior to scheduled CABG x 3 on 11/13/24 with Dr. Laci Ervin. Pmhx HTN, HLD, Aneurysm of aortic root, AFib (not on AC), GERD, asthma (denies inhaler use), fatty liver, alcohol abuse (reports 4 glasses of wine/nightly for past 10 years; previously abstinent w/ help of AA; hx of cocaine abuse 30 years ago)    CAD s/p C2L, LAAC on 11/13/24  Hypovolemia  Post op respiratory insufficiency  Acute blood loss anemia  Thrombocytopenia  Stress hyperglycemia          Plan:   ***Neuro***  Sedated with Precedex   Maintain day/night cycle to prevent ICU delirium   Postoperative acute pain control with Tylenol, Gabapentin, and prns.  Meds for sleep at bedtime     ***Cardiovascular***  At high risk for hemodynamic instability and cardiac arrhythmias.  Monitor for sign of hypoperfusion, trend lactate  Amio for afib prophylaxis   RIJ intro and RRAL placed (11/13)  ASA daily   Monitor chest tube output.      ***Pulmonary***  NC 3L   Postoperative acute respiratory insufficiency  Deep breathing and coughing exercises, IS, Mobilization, nebs, Chest PT      ***GI***  Keep NPO for now.  Protonix for stress ulcer prophylaxis   Bowel regimen.   Trend LFTs    ***Renal***  Trend Creatinine/BUN  Holm catheter for strict I/O measurements.   Replete electrolytes as indicated.      ***ID***  Perioperative coverage with Cefuroxime     ***Endocrine***  Insulin per protocol for Hyperglycemia     ***Hematology***  Acute blood loss anemia and thrombocytopenia   no current transfusion indication       I, Ham Rincon MD, personally performed the services described in this documentation. all medical record entries made by the scribe were at my direction and in my presence. I have reviewed the chart and agree that the record reflects my personal performance and is accurate and complete  Electronically signed:   Ham Rincon MD  CT ICU attending     ICU time: ** mins     By signing my name below, I, Rene Rivas, attest that this documentation has been prepared under the direction and in the presence of Ham Rincon MD  Electronically signed: Rene Rivas, 11-14-24 @ 07:12             Patient seen and examined at the bedside.    Remains critically ill on continuous ICU monitoring, at risk for life threatening decompensation.  All Labs, data reviewed. Plan of care discussed in length during multi-disciplinary ICU rounds.       Brief Summary:  73 yo male presents to PST prior to scheduled CABG x 3 on 11/13/24 with Dr. Laci Ervin. Pmhx HTN, HLD, Aneurysm of aortic root, AFib (not on AC), GERD, asthma (denies inhaler use), fatty liver, alcohol abuse (reports 4 glasses of wine/nightly for past 10 years; previously abstinent w/ help of AA; hx of cocaine abuse 30 years ago). Poor historian. Went for routine cardiology visit, found to have abnormal stress test. Cardiac cath performed on 10/24/24 revealed severe distal LM stenosis/multi-vessel disease.  POD# 1 from CABG x 2, LAAC. Normal BiV function    24 Hour events:  extubated to NC  started ASA, statin, BB  DVT ppx w/ lovenox  d- line    Objective:  Vital Signs Last 24 Hrs  T(C): 37.1 (14 Nov 2024 04:00), Max: 37.2 (14 Nov 2024 00:00)  T(F): 98.8 (14 Nov 2024 04:00), Max: 99 (14 Nov 2024 00:00)  HR: 79 (14 Nov 2024 07:00) (58 - 79)  BP: 136/82 (14 Nov 2024 05:00) (128/78 - 167/99)  BP(mean): 104 (14 Nov 2024 05:00) (98 - 118)  RR: 30 (14 Nov 2024 07:00) (11 - 30)  SpO2: 97% (14 Nov 2024 07:00) (96% - 100%)    Parameters below as of 14 Nov 2024 04:00  Patient On (Oxygen Delivery Method): nasal cannula  O2 Flow (L/min): 3      Mode: CPAP with PS  FiO2: 40  PEEP: 5  PS: 5  ITime: 1  MAP: 8  PIP: 12              Physical Exam:   General: Alert and interactive   Neurology: Arousable, able to follow commands   Respiratory: Bilateral breast sounds   CV: Sinus   Abdominal: Soft, Nontender  Extremities: Warm, well-perfused  -------------------------------------------------------------------------------------------------------------------------------    Labs:                        10.8   9.26  )-----------( 111      ( 14 Nov 2024 00:49 )             32.9     11-14    139  |  106  |  16  ----------------------------<  136[H]  4.5   |  20[L]  |  0.94    Ca    9.0      14 Nov 2024 00:50  Phos  4.4     11-14  Mg     2.2     11-14    TPro  6.1  /  Alb  3.7  /  TBili  0.5  /  DBili  x   /  AST  29  /  ALT  15  /  AlkPhos  65  11-14    LIVER FUNCTIONS - ( 14 Nov 2024 00:50 )  Alb: 3.7 g/dL / Pro: 6.1 g/dL / ALK PHOS: 65 U/L / ALT: 15 U/L / AST: 29 U/L / GGT: x           PT/INR - ( 13 Nov 2024 13:43 )   PT: 13.0 sec;   INR: 1.13 ratio         PTT - ( 13 Nov 2024 13:43 )  PTT:30.0 sec  ABG - ( 14 Nov 2024 00:12 )  pH, Arterial: 7.36  pH, Blood: x     /  pCO2: 38    /  pO2: 107   / HCO3: 22    / Base Excess: -3.6  /  SaO2: 99.1                  ALL MEDICATIONS   MEDICATIONS  (STANDING):  acetaminophen     Tablet .. 650 milliGRAM(s) Oral every 6 hours  aMIOdarone    Tablet 400 milliGRAM(s) Oral two times a day  ascorbic acid 500 milliGRAM(s) Oral two times a day  aspirin enteric coated 81 milliGRAM(s) Oral daily  aspirin Suppository 300 milliGRAM(s) Rectal once  cefuroxime  IVPB 1500 milliGRAM(s) IV Intermittent every 8 hours  chlorhexidine 0.12% Liquid 15 milliLiter(s) Oral Mucosa every 12 hours  chlorhexidine 2% Cloths 1 Application(s) Topical daily  dexMEDEtomidine Infusion 1.5 MICROgram(s)/kG/Hr (34.5 mL/Hr) IV Continuous <Continuous>  dextrose 50% Injectable 25 milliLiter(s) IV Push every 15 minutes  dextrose 50% Injectable 50 milliLiter(s) IV Push every 15 minutes  folic acid 1 milliGRAM(s) Oral daily  gabapentin 100 milliGRAM(s) Oral every 8 hours  influenza  Vaccine (HIGH DOSE) 0.5 milliLiter(s) IntraMuscular once  insulin regular Infusion 3 Unit(s)/Hr (3 mL/Hr) IV Continuous <Continuous>  meperidine     Injectable 25 milliGRAM(s) IV Push once  pantoprazole  Injectable 40 milliGRAM(s) IV Push daily  polyethylene glycol 3350 17 Gram(s) Oral daily  potassium chloride  10 mEq/50 mL IVPB 10 milliEquivalent(s) IV Intermittent every 1 hour  potassium chloride  10 mEq/50 mL IVPB 10 milliEquivalent(s) IV Intermittent every 1 hour  potassium chloride  10 mEq/50 mL IVPB 10 milliEquivalent(s) IV Intermittent every 1 hour  senna 2 Tablet(s) Oral at bedtime  sodium chloride 0.9%. 1000 milliLiter(s) (10 mL/Hr) IV Continuous <Continuous>  thiamine 100 milliGRAM(s) Oral daily    MEDICATIONS  (PRN):  albuterol/ipratropium for Nebulization 3 milliLiter(s) Nebulizer every 6 hours PRN Bronchospasm  HYDROmorphone  Injectable 0.5 milliGRAM(s) IV Push every 6 hours PRN Breakthrough Pain  oxyCODONE    IR 10 milliGRAM(s) Oral every 4 hours PRN Severe Pain (7 - 10)  oxyCODONE    IR 5 milliGRAM(s) Oral every 4 hours PRN Moderate Pain (4 - 6)    ------------------------------------------------------------------------------------------------------------------------------  Assessment:  73 yo male presents to PST prior to scheduled CABG x 3 on 11/13/24 with Dr. Laci Ervin. Pmhx HTN, HLD, Aneurysm of aortic root, AFib (not on AC), GERD, asthma (denies inhaler use), fatty liver, alcohol abuse (reports 4 glasses of wine/nightly for past 10 years; previously abstinent w/ help of AA; hx of cocaine abuse 30 years ago)    CAD s/p C2L, LAAC on 11/13/24  Hypovolemia  Post op respiratory insufficiency  Acute blood loss anemia  Thrombocytopenia  Stress hyperglycemia          Plan:   ***Neuro***  Maintain day/night cycle to prevent ICU delirium   Postoperative acute pain control with Tylenol, Gabapentin, and prns.    ***Cardiovascular***  At high risk for hemodynamic instability and cardiac arrhythmias.  Monitor for sign of hypoperfusion, trend lactate  ASA, statin, BB, ERP Amio  monitor chest tube    ***Pulmonary***  NC 3L , wean as tolerated  Postoperative acute respiratory insufficiency  Deep breathing and coughing exercises, IS, Mobilization, nebs, Chest PT    ***GI***  cardiac diet  Protonix for stress ulcer prophylaxis   Bowel regimen.   Trend LFTs    ***Renal***  Trend Creatinine/BUN  Holm catheter for strict I/O measurements.   Replete electrolytes as indicated.      ***ID***  Perioperative coverage with Cefuroxime     ***Endocrine***  Insulin per protocol for Hyperglycemia     ***Hematology***  Acute blood loss anemia and thrombocytopenia   no current transfusion indication       I, Ham Rincon MD, personally performed the services described in this documentation. all medical record entries made by the scribe were at my direction and in my presence. I have reviewed the chart and agree that the record reflects my personal performance and is accurate and complete  Electronically signed:   Ham Rincon MD  CT ICU attending     ICU time: 41 mins     By signing my name below, I, Rene Rivas, attest that this documentation has been prepared under the direction and in the presence of Ham Rincon MD  Electronically signed: Rene Rivas, 11-14-24 @ 07:12

## 2024-11-14 NOTE — PHYSICAL THERAPY INITIAL EVALUATION ADULT - CRITERIA FOR SKILLED THERAPEUTIC INTERVENTIONS
Patient : Giovanny Rizzo Age: 82year old Sex: male   MRN: [id number] Encounter Date: 2/12/2020      History     Chief Complaint   Patient presents with   â¢ Weakness   â¢ Black Stools     HPI     Giovanny Rizzo is a 82year old male presenting with generalized weakness, black stools. Patient presents with 1 day of increased generalized weakness, inability to get off the toilet this morning after having a bowel movement. The patient was seen in the emergency department on 12/23 with epigastric and substernal discomfort that happens at night with lying flat. He was improved with sitting up and drinking water. Patient notes that for the past 2-3 days he has been having dark black stools alternating between loose formed and diarrhea. Daughter notes that he was quite pale this morning when she saw him. He denies history of GI bleed. He has been taking Pepcid with minimal improvement. He denies chest pain, shortness of breath or lightheadedness. Allergies   Allergen Reactions   â¢ Statins MYALGIA       Current Discharge Medication List      Prior to Admission Medications    Details   allopurinol (ZYLOPRIM) 100 MG tablet TAKE 2 TABLETS DAILY  Qty: 180 tablet, Refills: 1      famotidine (PEPCID) 40 MG tablet Take 1 tablet by mouth nightly. Qty: 90 tablet, Refills: 0      metFORMIN (GLUCOPHAGE) 500 MG tablet Take 1 tablet by mouth 2 times daily (with meals). Qty: 180 tablet, Refills: 1      metoPROLOL succinate (TOPROL-XL) 100 MG 24 hr tablet Take 1 tablet by mouth daily. Qty: 90 tablet, Refills: 1      meloxicam (MOBIC) 7.5 MG tablet Take 1 tablet by mouth daily as needed (Arthritis pain). Take with food, stop if causes stomach upset  Qty: 90 tablet, Refills: 1      fenofibrate micronized (LOFIBRA) 134 MG capsule TAKE 1 CAPSULE DAILY BEFOREBREAKFAST  Qty: 90 capsule, Refills: 3      amlodipine-valsartan (EXFORGE)  MG per tablet Take 1 tablet by mouth daily.   Qty: 90 tablet, Refills: 4      ketoconazole (NIZORAL) 2 % cream Apply to feet twice a day for a 7 day course as needed for skin peeling  Qty: 2 Tube, Refills: 2      Cholecalciferol (VITAMIN D) 2000 UNITS CAPS Take 1 capsule by mouth daily. Indications: takes 2 tablets daily 3000mg      Multiple Vitamin (MULTIVITAMINS) OR TABS 1 TABLET DAILY  Qty: 90 Tab, Refills: 1      ASPIRIN 81 MG PO TABS one tablet by mouth every day  Qty: 0, Refills: 0             Current Discharge Medication List          Past Medical History:   Diagnosis Date   â¢ Cataract    â¢ Coronary atherosclerosis of unspecified type of vessel, native or graft     s/p plasty, stent   â¢ DM type 2 (diabetes mellitus, type 2) (CMS/HCC)    â¢ Fibromyalgia    â¢ Gait instability    â¢ Gout, unspecified     2 episode within mo   â¢ Inclusion body myositis    â¢ Nephrolithiasis    â¢ Osteoarthritis    â¢ Other and unspecified hyperlipidemia    â¢ Pseudophakia    â¢ Spinal stenosis, lumbar region, without neurogenic claudication    â¢ Ulnar neuropathy     left   â¢ Unspecified asthma(493.90)    â¢ Unspecified essential hypertension        Past Surgical History:   Procedure Laterality Date   â¢ ANGIOPLASTY  82   â¢ BACK SURGERY  02    to alleviate drop foot- didn't help   â¢ CATARACT EXTRACTION, BILATERAL     â¢ COLONOSCOPY DIAGNOSTIC  2001   â¢ STRESS TEST  '12    NL        Family History   Problem Relation Age of Onset   â¢ Cancer Sister    â¢ Heart disease Brother        Social History     Tobacco Use   â¢ Smoking status: Former Smoker     Packs/day: 1.00     Years: 30.00     Pack years: 30.00     Types: Cigarettes     Last attempt to quit: 10/30/1976     Years since quittin.3   â¢ Smokeless tobacco: Never Used   Substance Use Topics   â¢ Alcohol use: Yes     Alcohol/week: 3.3 - 5.0 standard drinks     Types: 4 - 6 Standard drinks or equivalent per week     Comment: occasionally   â¢ Drug use: No       Review of Systems   Constitutional: Negative for fever. HENT: Negative for congestion.     Respiratory: "Negative for shortness of breath. Cardiovascular: Negative for chest pain. Gastrointestinal: Positive for abdominal pain and blood in stool. Negative for nausea and vomiting. Musculoskeletal: Negative. Skin: Positive for pallor. Negative for rash. Neurological: Positive for weakness (Generalized). Negative for light-headedness. All other systems reviewed and are negative. Physical Exam     ED Triage Vitals [02/12/20 1029]   ED Triage Vitals Group      Temp 97.8 Â°F (36.6 Â°C)      Heart Rate 78      Resp 17      /58      SpO2 100 %      EtCO2 mmHg       Height 5' 7"" (1.702 m)      Weight 195 lb 11.2 oz (88.8 kg)      Weight Scale Used ED Actual      BMI (Calculated) 30.65      IBW/kg (Calculated) 66.1       Physical Exam   Constitutional: He is oriented to person, place, and time. He appears well-developed and well-nourished. No distress. HENT:   Head: Normocephalic and atraumatic. Eyes: Pupils are equal, round, and reactive to light. Conjunctivae are normal.   Neck: No tracheal deviation present. Cardiovascular: Normal rate, regular rhythm, normal heart sounds and intact distal pulses. Pulmonary/Chest: Effort normal. No respiratory distress. He has no wheezes. He has no rales. Abdominal: Soft. He exhibits no distension. There is no tenderness. There is no guarding. Genitourinary: Rectum:      Guaiac result positive (TRAE with melena ). Musculoskeletal: Normal range of motion. General: Edema (Chronic, lower extremity edema at reported baseline) present. Neurological: He is alert and oriented to person, place, and time. Skin: Skin is warm and dry. Psychiatric: He has a normal mood and affect. Nursing note and vitals reviewed.       ED Course     Critical Care  Performed by: Gissel Pacheco DO  Authorized by: Gissel Pacheco DO     Critical care provider statement:     Critical care time (minutes):  45    Critical care time was exclusive of:  Separately " billable procedures and treating other patients and teaching time    Critical care was necessary to treat or prevent imminent or life-threatening deterioration of the following conditions:  Circulatory failure    Critical care was time spent personally by me on the following activities:  Blood draw for specimens, development of treatment plan with patient or surrogate, discussions with consultants, evaluation of patient's response to treatment, examination of patient, obtaining history from patient or surrogate, review of old charts, re-evaluation of patient's condition and ordering and review of laboratory studies        Lab Results     Results for orders placed or performed during the hospital encounter of 02/12/20   Comprehensive Metabolic Panel   Result Value Ref Range    Sodium 141 135 - 145 mmol/L    Potassium 4.8 3.4 - 5.1 mmol/L    Chloride 106 98 - 107 mmol/L    Carbon Dioxide 23 21 - 32 mmol/L    Anion Gap 17 10 - 20 mmol/L    Glucose 207 (H) 65 - 99 mg/dL    BUN 84 (H) 6 - 20 mg/dL    Creatinine 0.57 (L) 0.67 - 1.17 mg/dL    Glomerular Filtration Rate 94     BUN/ Creatinine Ratio 147 (H) 7 - 25    Bilirubin, Total 0.1 (L) 0.2 - 1.0 mg/dL    GOT/AST 16 <=37 Units/L    Alkaline Phosphatase 45 45 - 117 Units/L    Albumin 2.9 (L) 3.6 - 5.1 g/dL    Protein, Total 5.4 (L) 6.4 - 8.2 g/dL    Globulin 2.5 2.0 - 4.0 g/dL    A/G Ratio 1.2 1.0 - 2.4    GPT/ALT 29 <64 Units/L    Calcium 9.2 8.4 - 10.2 mg/dL   Prothrombin Time   Result Value Ref Range    Prothrombin Time 12.0 (H) 9.7 - 11.8 sec    INR 1.2 <=5.0 NULL   CBC with Automated Differential (performable only)   Result Value Ref Range    WBC 9.6 4.2 - 11.0 K/mcL    RBC 1.66 (L) 4.50 - 5.90 mil/mcL    HGB 5.3 (LL) 13.0 - 17.0 g/dL    HCT 15.6 (L) 39.0 - 51.0 %    MCV 94.0 78.0 - 100.0 fl    MCH 31.9 26.0 - 34.0 pg    MCHC 34.0 32.0 - 36.5 g/dL    RDW-SD 48.6 39.0 - 50.0 fL    RDW-CV 15.0 11.0 - 15.0 %     140 - 450 K/mcL    NRBC 1 (H) <=0 /100 WBC Neutrophil, Percent 77 %    Lymphocytes, Percent 17 %    Mono, Percent 5 %    Eosinophils, Percent 0 %    Basophils, Percent 0 %    Immature Granulocytes 1 %    Absolute Neutrophils 7.3 1.8 - 7.7 K/mcL    Absolute Lymphocytes 1.6 1.0 - 4.0 K/mcL    Absolute Monocytes 0.4 0.3 - 0.9 K/mcL    Absolute Eosinophils  0.0 (L) 0.1 - 0.5 K/mcL    Absolute Basophils 0.0 0.0 - 0.3 K/mcL    Absolute Immmature Granulocytes 0.1 0.0 - 0.2 K/mcL   Troponin I Ultra Sensitive   Result Value Ref Range    Troponin I, Ultra Sensitive <0.02 <=0.04 ng/mL   Manual Differential   Result Value Ref Range    Hypochromia Few     Macrocytosis Few     Microcytosis Few     Large Platelets Present     Platelet Morphology Normal Normal    Polychromasia Few     Toxic Granulation Present    TYPE/SCREEN   Result Value Ref Range    ABO/RH(D) AB Rh Positive     ANTIBODY SCREEN Negative    Prepare Red Blood Cells: 2 Units   Result Value Ref Range    UNIT BLOOD TYPE O Pos     ISBT BLOOD TYPE 5100     BLOOD EXPIRATION DATE [id number]     UNIT NUMBER [id number]     DISPENSE STATUS Issued     PRODUCT ID Red Blood Cells     PRODUCT CODE [id number]     PRODUCT DESCRIPTION RBC AS-1 LR     UNIT BLOOD TYPE AB Pos     ISBT BLOOD TYPE 8400     BLOOD EXPIRATION DATE [id number]     UNIT NUMBER [id number]     DISPENSE STATUS Work In Progress     PRODUCT ID Red Blood Cells     PRODUCT CODE [id number]     PRODUCT DESCRIPTION RBC ACD-A LR     CROSSMATCH RESULT Compatible     ISSUE DATE/TIME [id number]    TROPONIN I  POINT OF CARE   Result Value Ref Range    TROPONIN I - POINT OF CARE <0.10 <=0.10 ng/mL       EKG Results     EKG Interpretation  EKG time: 1033  EKG interpretation time: 1058  Rate: 70  Rhythm: normal sinus rhythm   STEMI: no  Abnormality: stable t wave inversion/flattening laterally, unchanged    EKG interpreted by ED physician      Radiology Results     Imaging Results    None         ED Medication Orders (From admission, onward)    Ordered Start     Status Ordering Provider    02/12/20 1235 02/12/20 1236  sodium chloride (NORMAL SALINE) 0.9 % bolus 500 mL  ONCE      Last MAR action:  New Bag BOONE CHAIREZ P    02/12/20 1234 02/12/20 1300  pantoprazole (PROTONIX) 80 mg/100 mL in sodium chloride 0.9 % infusion  CONTINUOUS      Acknowledged BOONE CHAIREZ P    02/12/20 1154 02/12/20 1154  sodium chloride 0.9% infusion  CONTINUOUS PRN      Acknowledged BOONE CHAIREZ P    02/12/20 1132 02/12/20 1133  pantoprazole (PROTONIX) injection 80 mg  ONCE      Last MAR action:  Given BOONE CHAIREZ P    02/12/20 1044 02/12/20 1045  sodium chloride (NORMAL SALINE) 0.9 % bolus 500 mL  ONCE      Last MAR action:  Completed Lopez Hernandez is a 82year old male presenting to the ED for generalized weakness. Initial vitals on presentation are normal.  EKG interpreted by myself demonstrates NSR with lateral T wave flattenin/inversion similar to prior. Final Review to follow. Patient has history and physical exam findings consistent with acute upper GI bleed. He was given protonix IV and 500cc NS bolus. EMR reviewed from 12/23 ED evaluation and diagnostic labs. Labs demonstrate acute drop in Hgb from 13.8 to 5.3 since 12/23. On reevaluation the patient had downtrend in his SBP to 87 systolic during ED course. He was given an additional 500cc NS bolus and 1 unit of emergent uncrossed blood was requested while type and cross is completed. I verbally consented the patient for transfusion with daughter at bedside. I spoke with Dr. Keely Oliva, GI, who requested ppi gtt. I updated the patient on the results of their evaluation and they are comfortable with plan for disposition as discussed. I spoke with Dr. Destini Gonzales, hospitalist, who is agreeable with plan for admission for further care and evaluation. Clinical Impression     ED Diagnosis   1. Acute upper GI bleed     2.  Acute blood loss anemia     3.  Hypotension due to hypovolemia         Disposition        Admit 2/12/2020 12:31 PM  Telemetry Bed?: Yes  Patient Class: Inpatient [1]  Level of Care: Intermediate/Stepdown [8]  Admission Diagnosis: Upper GI bleed [346554]  Admitting Physician: Logan Orozco [[id number]]  Is this a telephone or verbal order?: This is a telephone order from the admitting physician       Sreedhar Huff DO  02/12/20 0061 impairments found

## 2024-11-14 NOTE — PROGRESS NOTE ADULT - SUBJECTIVE AND OBJECTIVE BOX
Subjective: Pt states "Hello " denies any CP, SOB, N/V and palpitations. No acute events overnight.     VITAL SIGNS    Telemetry:  SR 69  Vital Signs Last 24 Hrs  T(C): 37.6 (24 @ 12:00), Max: 37.6 (24 @ 08:00)  T(F): 99.7 (24 @ 12:00), Max: 99.7 (24 @ 08:00)  HR: 65 (24 @ 12:46) (62 - 96)  BP: 101/63 (24 @ 12:46) (101/63 - 156/91)  RR: 18 (24 @ 12:46) (11 - 54)  SpO2: 95% (24 @ 12:46) (95% - 100%)             @ 07:01  -   @ 07:00  --------------------------------------------------------  IN: 1779.6 mL / OUT: 1490 mL / NET: 289.6 mL     @ 07:01  -   @ 14:12  --------------------------------------------------------  IN: 483 mL / OUT: 360 mL / NET: 123 mL          Daily Weight in k (2024 00:00)      Bilirubin Total: 0.5 mg/dL ( @ 00:50)    CAPILLARY BLOOD GLUCOSE  202 (2024 11:00)  147 (2024 07:00)  154 (2024 06:00)  146 (2024 04:00)  145 (2024 02:00)  128 (2024 00:00)  119 (2024 23:00)  92 (2024 22:00)  128 (2024 21:00)  130 (2024 20:00)  160 (2024 19:00)  174 (2024 18:00)  140 (2024 17:00)  129 (2024 16:00)  161 (2024 15:00)      POCT Blood Glucose.: 202 mg/dL (2024 10:46)  POCT Blood Glucose.: 147 mg/dL (2024 06:57)  POCT Blood Glucose.: 154 mg/dL (2024 05:57)  POCT Blood Glucose.: 146 mg/dL (2024 03:55)  POCT Blood Glucose.: 145 mg/dL (2024 02:01)  POCT Blood Glucose.: 119 mg/dL (2024 22:57)  POCT Blood Glucose.: 92 mg/dL (2024 22:10)  POCT Blood Glucose.: 128 mg/dL (2024 20:50)  POCT Blood Glucose.: 130 mg/dL (2024 19:48)  POCT Blood Glucose.: 174 mg/dL (2024 18:01)  POCT Blood Glucose.: 140 mg/dL (2024 17:06)  POCT Blood Glucose.: 129 mg/dL (2024 15:53)  POCT Blood Glucose.: 161 mg/dL (2024 14:54)          MEDICATIONS  acetaminophen     Tablet .. 650 milliGRAM(s) Oral every 6 hours  albuterol/ipratropium for Nebulization 3 milliLiter(s) Nebulizer every 6 hours PRN  aMIOdarone    Tablet 400 milliGRAM(s) Oral two times a day  ascorbic acid 500 milliGRAM(s) Oral two times a day  aspirin enteric coated 81 milliGRAM(s) Oral daily  atorvastatin 80 milliGRAM(s) Oral at bedtime  cefuroxime  IVPB 1500 milliGRAM(s) IV Intermittent every 8 hours  chlorhexidine 0.12% Liquid 15 milliLiter(s) Oral Mucosa every 12 hours  chlorhexidine 2% Cloths 1 Application(s) Topical daily  dextrose 50% Injectable 25 milliLiter(s) IV Push every 15 minutes  dextrose 50% Injectable 50 milliLiter(s) IV Push every 15 minutes  enoxaparin Injectable 40 milliGRAM(s) SubCutaneous every 24 hours  folic acid 1 milliGRAM(s) Oral daily  gabapentin 100 milliGRAM(s) Oral every 8 hours  HYDROmorphone  Injectable 0.5 milliGRAM(s) IV Push every 6 hours PRN  influenza  Vaccine (HIGH DOSE) 0.5 milliLiter(s) IntraMuscular once  insulin lispro (ADMELOG) corrective regimen sliding scale   SubCutaneous three times a day before meals  metoprolol tartrate 25 milliGRAM(s) Oral two times a day  oxyCODONE    IR 10 milliGRAM(s) Oral every 4 hours PRN  oxyCODONE    IR 5 milliGRAM(s) Oral every 4 hours PRN  pantoprazole    Tablet 40 milliGRAM(s) Oral before breakfast  polyethylene glycol 3350 17 Gram(s) Oral daily  potassium chloride  10 mEq/50 mL IVPB 10 milliEquivalent(s) IV Intermittent every 1 hour  potassium chloride  10 mEq/50 mL IVPB 10 milliEquivalent(s) IV Intermittent every 1 hour  potassium chloride  10 mEq/50 mL IVPB 10 milliEquivalent(s) IV Intermittent every 1 hour  senna 2 Tablet(s) Oral at bedtime  sodium chloride 0.9% lock flush 3 milliLiter(s) IV Push every 8 hours  sodium chloride 0.9%. 1000 milliLiter(s) IV Continuous <Continuous>  thiamine 100 milliGRAM(s) Oral daily        PHYSICAL EXAM    Neurology: A&Ox3, nonfocal, no gross deficits  CV : RRR+S1S2  Sternal Wound :  MSI CDI , Stable, 1V/1groung wire to BOX 40/10  Lungs: Respirations non-labored, B/L BS  Abdomen: Soft, NT/ND, +BSx4Q,  (-)N/V/D  : Holm D/C'd DTV at 8 pm  Extremities: trace B/L LE edema, negative calf tenderness, +PP   MCT to waterseal no airleak minimal serosanguinous drainage  LPCT to waterseal no airleak minimal sreosanguinous drainage    LABS  -    139  |  106  |  16  ----------------------------<  136[H]  4.5   |  20[L]  |  0.94    Ca    9.0      2024 00:50  Phos  4.4     11-14  Mg     2.2     -14    TPro  6.1  /  Alb  3.7  /  TBili  0.5  /  DBili  x   /  AST  29  /  ALT  15  /  AlkPhos  65  11-14                                 10.8   9.26  )-----------( 111      ( 2024 00:49 )             32.9          PT/INR - ( 2024 13:43 )   PT: 13.0 sec;   INR: 1.13 ratio         PTT - ( 2024 13:43 )  PTT:30.0 sec       PAST MEDICAL & SURGICAL HISTORY:  HLD (hyperlipidemia)      HTN (hypertension)      PAF (paroxysmal atrial fibrillation)      GERD (gastroesophageal reflux disease)      AA (alcohol abuse)      Aortic root dilation      CAD (coronary artery disease)      S/P left inguinal hernia repair      S/P hemorrhoidectomy      History of cardiac cath           Physical Therapy Rec:   Home  [ X  ]   Home w/ PT  [  ]  Rehab  [  ]    Discussed with CT Surgery attending.

## 2024-11-14 NOTE — OCCUPATIONAL THERAPY INITIAL EVALUATION ADULT - PERTINENT HX OF CURRENT PROBLEM, REHAB EVAL
75 yo male presents to [facility] prior to scheduled CABG x 3 on 11/13/24 with Dr. Laci Ervin. Pmhx HTN, HLD, Aneurysm of aortic root, AFib (not on AC), GERD, asthma (denies inhaler use), fatty liver, alcohol abuse (reports 4 glasses of wine/nightly for past 10 years; previously abstinent w/ help of AA; hx of cocaine abuse 30 years ago). Poor historian. Went for routine cardiology visit, found to have abnormal stress test. Cardiac cath performed on 10/24/24 revealed severe distal LM stenosis/multi-vessel disease.  Endorses occasional chest pain, dizziness and SALEEM w/ moderate exertion. Denies fever, chills, syncope, falls. Recently evaluated by Dr. Ervin and above surgery was recommended.  11/13 S/P C2L/JENIFER clip, central line and marley d/c'brendan. angela d/c'd, transfer to floor 73 yo male [facility] prior to scheduled CABG x 3 on 11/13/24 with Dr. Laci Ervin. Pmhx HTN, HLD, Aneurysm of aortic root, AFib (not on AC), GERD, asthma (denies inhaler use), fatty liver, alcohol abuse (reports 4 glasses of wine/nightly for past 10 years; previously abstinent w/ help of AA; hx of cocaine abuse 30 years ago). Poor historian. Went for routine cardiology visit, found to have abnormal stress test. Cardiac cath performed on 10/24/24 revealed severe distal LM stenosis/multi-vessel disease.  Endorses occasional chest pain, dizziness and SALEEM w/ moderate exertion. Denies fever, chills, syncope, falls. Recently evaluated by Dr. Ervin and above surgery was recommended.11/13 S/P C2L/JENIFER clip, central line and marley d/c'brendan. angela d/c'd, transfer to floor

## 2024-11-14 NOTE — OCCUPATIONAL THERAPY INITIAL EVALUATION ADULT - LEVEL OF INDEPENDENCE: GROOMING, OT EVAL
educated on how to perform grooming tasks while maintaining sternal precautions. pt with understanding

## 2024-11-14 NOTE — PROGRESS NOTE ADULT - PROBLEM SELECTOR PLAN 1
s/p C2L,JENIFER Clip  Continue Lop25 q12h  Continue LOV for dvt prophalaxsis  Pulm Toilet: IS and C&DB every hour  Increase activity , ambulate at least 3/day  Ct's to WS may d/c in am  May shower on POD 5  D/C planning to home when medically cleared

## 2024-11-14 NOTE — PROGRESS NOTE ADULT - PROBLEM SELECTOR PLAN 3
Continue Lopressor 25 q12  May use intermittent Hydralazine  Keep pain under control w/ Tylenol, Liana, Robaxin and Oxycodone as needed

## 2024-11-14 NOTE — PHYSICAL THERAPY INITIAL EVALUATION ADULT - ADDITIONAL COMMENTS
Pt resides in private home, alone, 0 steps to enter,  one level within, PTA independent with mobility and ADL's, owns cane, goes to outpatient PT 2x/wk due to knee OA, (+)driving, (+) working as a cook.

## 2024-11-15 DIAGNOSIS — Z95.1 PRESENCE OF AORTOCORONARY BYPASS GRAFT: ICD-10-CM

## 2024-11-15 LAB
ALBUMIN SERPL ELPH-MCNC: 3.3 G/DL — SIGNIFICANT CHANGE UP (ref 3.3–5)
ALP SERPL-CCNC: 49 U/L — SIGNIFICANT CHANGE UP (ref 40–120)
ALT FLD-CCNC: 11 U/L — SIGNIFICANT CHANGE UP (ref 10–45)
ANION GAP SERPL CALC-SCNC: 11 MMOL/L — SIGNIFICANT CHANGE UP (ref 5–17)
AST SERPL-CCNC: 21 U/L — SIGNIFICANT CHANGE UP (ref 10–40)
BASOPHILS # BLD AUTO: 0.01 K/UL — SIGNIFICANT CHANGE UP (ref 0–0.2)
BASOPHILS NFR BLD AUTO: 0.1 % — SIGNIFICANT CHANGE UP (ref 0–2)
BILIRUB SERPL-MCNC: 0.5 MG/DL — SIGNIFICANT CHANGE UP (ref 0.2–1.2)
BUN SERPL-MCNC: 14 MG/DL — SIGNIFICANT CHANGE UP (ref 7–23)
CALCIUM SERPL-MCNC: 8.7 MG/DL — SIGNIFICANT CHANGE UP (ref 8.4–10.5)
CHLORIDE SERPL-SCNC: 101 MMOL/L — SIGNIFICANT CHANGE UP (ref 96–108)
CO2 SERPL-SCNC: 25 MMOL/L — SIGNIFICANT CHANGE UP (ref 22–31)
CREAT SERPL-MCNC: 0.91 MG/DL — SIGNIFICANT CHANGE UP (ref 0.5–1.3)
EGFR: 90 ML/MIN/1.73M2 — SIGNIFICANT CHANGE UP
EOSINOPHIL # BLD AUTO: 0 K/UL — SIGNIFICANT CHANGE UP (ref 0–0.5)
EOSINOPHIL NFR BLD AUTO: 0 % — SIGNIFICANT CHANGE UP (ref 0–6)
GLUCOSE BLDC GLUCOMTR-MCNC: 104 MG/DL — HIGH (ref 70–99)
GLUCOSE BLDC GLUCOMTR-MCNC: 122 MG/DL — HIGH (ref 70–99)
GLUCOSE BLDC GLUCOMTR-MCNC: 140 MG/DL — HIGH (ref 70–99)
GLUCOSE BLDC GLUCOMTR-MCNC: 156 MG/DL — HIGH (ref 70–99)
GLUCOSE SERPL-MCNC: 119 MG/DL — HIGH (ref 70–99)
HCT VFR BLD CALC: 26.2 % — LOW (ref 39–50)
HGB BLD-MCNC: 8.8 G/DL — LOW (ref 13–17)
IMM GRANULOCYTES NFR BLD AUTO: 0.7 % — SIGNIFICANT CHANGE UP (ref 0–0.9)
LYMPHOCYTES # BLD AUTO: 1.19 K/UL — SIGNIFICANT CHANGE UP (ref 1–3.3)
LYMPHOCYTES # BLD AUTO: 13 % — SIGNIFICANT CHANGE UP (ref 13–44)
MAGNESIUM SERPL-MCNC: 2.1 MG/DL — SIGNIFICANT CHANGE UP (ref 1.6–2.6)
MCHC RBC-ENTMCNC: 29.8 PG — SIGNIFICANT CHANGE UP (ref 27–34)
MCHC RBC-ENTMCNC: 33.6 G/DL — SIGNIFICANT CHANGE UP (ref 32–36)
MCV RBC AUTO: 88.8 FL — SIGNIFICANT CHANGE UP (ref 80–100)
MONOCYTES # BLD AUTO: 0.95 K/UL — HIGH (ref 0–0.9)
MONOCYTES NFR BLD AUTO: 10.4 % — SIGNIFICANT CHANGE UP (ref 2–14)
NEUTROPHILS # BLD AUTO: 6.96 K/UL — SIGNIFICANT CHANGE UP (ref 1.8–7.4)
NEUTROPHILS NFR BLD AUTO: 75.8 % — SIGNIFICANT CHANGE UP (ref 43–77)
NRBC # BLD: 0 /100 WBCS — SIGNIFICANT CHANGE UP (ref 0–0)
PHOSPHATE SERPL-MCNC: 2.2 MG/DL — LOW (ref 2.5–4.5)
PLATELET # BLD AUTO: 96 K/UL — LOW (ref 150–400)
POTASSIUM SERPL-MCNC: 4.3 MMOL/L — SIGNIFICANT CHANGE UP (ref 3.5–5.3)
POTASSIUM SERPL-SCNC: 4.3 MMOL/L — SIGNIFICANT CHANGE UP (ref 3.5–5.3)
PROT SERPL-MCNC: 5.3 G/DL — LOW (ref 6–8.3)
RBC # BLD: 2.95 M/UL — LOW (ref 4.2–5.8)
RBC # FLD: 14.4 % — SIGNIFICANT CHANGE UP (ref 10.3–14.5)
SODIUM SERPL-SCNC: 137 MMOL/L — SIGNIFICANT CHANGE UP (ref 135–145)
WBC # BLD: 9.17 K/UL — SIGNIFICANT CHANGE UP (ref 3.8–10.5)
WBC # FLD AUTO: 9.17 K/UL — SIGNIFICANT CHANGE UP (ref 3.8–10.5)

## 2024-11-15 PROCEDURE — 71045 X-RAY EXAM CHEST 1 VIEW: CPT | Mod: 26

## 2024-11-15 RX ORDER — SPIRONOLACTONE 25 MG
25 TABLET ORAL DAILY
Refills: 0 | Status: DISCONTINUED | OUTPATIENT
Start: 2024-11-15 | End: 2024-11-18

## 2024-11-15 RX ORDER — CYANOCOBALAMIN/FOLIC AC/VIT B6 1-2.2-25MG
1 TABLET ORAL DAILY
Refills: 0 | Status: DISCONTINUED | OUTPATIENT
Start: 2024-11-15 | End: 2024-11-18

## 2024-11-15 RX ORDER — FUROSEMIDE 40 MG/1
40 TABLET ORAL DAILY
Refills: 0 | Status: DISCONTINUED | OUTPATIENT
Start: 2024-11-15 | End: 2024-11-18

## 2024-11-15 RX ORDER — MOMETASONE FUROATE 220 UG/1
1 INHALANT RESPIRATORY (INHALATION) DAILY
Refills: 0 | Status: DISCONTINUED | OUTPATIENT
Start: 2024-11-15 | End: 2024-11-18

## 2024-11-15 RX ORDER — CHLORDIAZEPOXIDE HCL 10 MG
5 CAPSULE ORAL THREE TIMES A DAY
Refills: 0 | Status: DISCONTINUED | OUTPATIENT
Start: 2024-11-15 | End: 2024-11-18

## 2024-11-15 RX ADMIN — Medication 100 MILLIGRAM(S): at 12:33

## 2024-11-15 RX ADMIN — METOPROLOL TARTRATE 25 MILLIGRAM(S): 100 TABLET, FILM COATED ORAL at 17:17

## 2024-11-15 RX ADMIN — Medication 100 MILLIGRAM(S): at 05:14

## 2024-11-15 RX ADMIN — Medication 500 MILLIGRAM(S): at 17:17

## 2024-11-15 RX ADMIN — Medication 5 MILLIGRAM(S): at 20:50

## 2024-11-15 RX ADMIN — MOMETASONE FUROATE 1 PUFF(S): 220 INHALANT RESPIRATORY (INHALATION) at 14:40

## 2024-11-15 RX ADMIN — GABAPENTIN 100 MILLIGRAM(S): 300 CAPSULE ORAL at 05:09

## 2024-11-15 RX ADMIN — Medication 500 MILLIGRAM(S): at 05:14

## 2024-11-15 RX ADMIN — AMIODARONE HYDROCHLORIDE 400 MILLIGRAM(S): 200 TABLET ORAL at 17:17

## 2024-11-15 RX ADMIN — Medication 1 MILLIGRAM(S): at 12:33

## 2024-11-15 RX ADMIN — SODIUM CHLORIDE 3 MILLILITER(S): 9 INJECTION, SOLUTION INTRAMUSCULAR; INTRAVENOUS; SUBCUTANEOUS at 22:06

## 2024-11-15 RX ADMIN — ENOXAPARIN SODIUM 40 MILLIGRAM(S): 30 INJECTION SUBCUTANEOUS at 12:32

## 2024-11-15 RX ADMIN — OXYCODONE HYDROCHLORIDE 10 MILLIGRAM(S): 30 TABLET ORAL at 09:46

## 2024-11-15 RX ADMIN — SODIUM CHLORIDE 3 MILLILITER(S): 9 INJECTION, SOLUTION INTRAMUSCULAR; INTRAVENOUS; SUBCUTANEOUS at 13:08

## 2024-11-15 RX ADMIN — ACETAMINOPHEN 500MG 650 MILLIGRAM(S): 500 TABLET, COATED ORAL at 12:33

## 2024-11-15 RX ADMIN — OXYCODONE HYDROCHLORIDE 10 MILLIGRAM(S): 30 TABLET ORAL at 10:16

## 2024-11-15 RX ADMIN — Medication 5 MILLIGRAM(S): at 13:00

## 2024-11-15 RX ADMIN — FUROSEMIDE 40 MILLIGRAM(S): 40 TABLET ORAL at 09:34

## 2024-11-15 RX ADMIN — GABAPENTIN 100 MILLIGRAM(S): 300 CAPSULE ORAL at 13:01

## 2024-11-15 RX ADMIN — PANTOPRAZOLE SODIUM 40 MILLIGRAM(S): 40 TABLET, DELAYED RELEASE ORAL at 05:10

## 2024-11-15 RX ADMIN — POLYETHYLENE GLYCOL 3350 17 GRAM(S): 17 POWDER, FOR SOLUTION ORAL at 12:33

## 2024-11-15 RX ADMIN — SODIUM CHLORIDE 3 MILLILITER(S): 9 INJECTION, SOLUTION INTRAMUSCULAR; INTRAVENOUS; SUBCUTANEOUS at 05:47

## 2024-11-15 RX ADMIN — AMIODARONE HYDROCHLORIDE 400 MILLIGRAM(S): 200 TABLET ORAL at 05:09

## 2024-11-15 RX ADMIN — CHLORHEXIDINE GLUCONATE 1 APPLICATION(S): 1.2 RINSE ORAL at 12:32

## 2024-11-15 RX ADMIN — OXYCODONE HYDROCHLORIDE 10 MILLIGRAM(S): 30 TABLET ORAL at 21:45

## 2024-11-15 RX ADMIN — Medication 1 TABLET(S): at 17:17

## 2024-11-15 RX ADMIN — Medication 81 MILLIGRAM(S): at 12:33

## 2024-11-15 RX ADMIN — Medication 80 MILLIGRAM(S): at 20:50

## 2024-11-15 RX ADMIN — Medication 2: at 12:32

## 2024-11-15 RX ADMIN — OXYCODONE HYDROCHLORIDE 10 MILLIGRAM(S): 30 TABLET ORAL at 20:50

## 2024-11-15 RX ADMIN — METOPROLOL TARTRATE 25 MILLIGRAM(S): 100 TABLET, FILM COATED ORAL at 05:09

## 2024-11-15 RX ADMIN — ACETAMINOPHEN 500MG 650 MILLIGRAM(S): 500 TABLET, COATED ORAL at 17:17

## 2024-11-15 RX ADMIN — Medication 25 MILLIGRAM(S): at 09:34

## 2024-11-15 RX ADMIN — GABAPENTIN 100 MILLIGRAM(S): 300 CAPSULE ORAL at 20:50

## 2024-11-15 RX ADMIN — Medication 5 MILLIGRAM(S): at 09:34

## 2024-11-15 RX ADMIN — ACETAMINOPHEN 500MG 650 MILLIGRAM(S): 500 TABLET, COATED ORAL at 05:09

## 2024-11-15 NOTE — PROGRESS NOTE ADULT - PROBLEM SELECTOR PLAN 2
Continue to monitor LFT's  Continue Vitamin C   Continue Folic acid Continue Lopressor 25 q12  May use intermittent Hydralazine  Keep pain under control w/ Tylenol, Liana, Robaxin and Oxycodone as needed

## 2024-11-15 NOTE — PROGRESS NOTE ADULT - PROBLEM SELECTOR PLAN 3
Continue Lopressor 25 q12  May use intermittent Hydralazine  Keep pain under control w/ Tylenol, Liana, Robaxin and Oxycodone as needed continue postop care  continue asa and statin  continue bb lop 25 mg po bid  d/c pw   maintain ct as per Dr. Ervin today  diuresis  pulm toilet  pain management  bowel regimen  increase activity as tolerated  discharge planning- home when stable

## 2024-11-15 NOTE — PROGRESS NOTE ADULT - SUBJECTIVE AND OBJECTIVE BOX
VITAL SIGNS    Telemetry:    Vital Signs Last 24 Hrs  T(C): 37.6 (11-15-24 @ 04:29), Max: 37.6 (11-14-24 @ 08:00)  T(F): 99.6 (11-15-24 @ 04:29), Max: 99.7 (11-14-24 @ 08:00)  HR: 89 (11-15-24 @ 04:29) (65 - 96)  BP: 129/74 (11-15-24 @ 04:29) (101/63 - 129/74)  RR: 18 (11-15-24 @ 04:29) (18 - 54)  SpO2: 95% (11-15-24 @ 04:29) (95% - 99%)            11-14 @ 07:01  -  11-15 @ 07:00  --------------------------------------------------------  IN: 843 mL / OUT: 1655 mL / NET: -812 mL       Daily     Daily   Admit Wt: Drug Dosing Weight  Height (cm): 182.8 (13 Nov 2024 08:50)  Weight (kg): 92.1 (13 Nov 2024 08:50)  BMI (kg/m2): 27.6 (13 Nov 2024 08:50)  BSA (m2): 2.14 (13 Nov 2024 08:50)      CAPILLARY BLOOD GLUCOSE  202 (14 Nov 2024 11:00)      POCT Blood Glucose.: 112 mg/dL (14 Nov 2024 21:56)  POCT Blood Glucose.: 128 mg/dL (14 Nov 2024 16:49)  POCT Blood Glucose.: 202 mg/dL (14 Nov 2024 10:46)          LABS:    11-14 @ 07:01  -  11-15 @ 07:00  --------------------------------------------------------  IN: 843 mL / OUT: 1655 mL / NET: -812 mL    cret                        10.8   9.26  )-----------( 111      ( 14 Nov 2024 00:49 )             32.9     11-14    139  |  106  |  16  ----------------------------<  136[H]  4.5   |  20[L]  |  0.94    Ca    9.0      14 Nov 2024 00:50  Phos  4.4     11-14  Mg     2.2     11-14    TPro  6.1  /  Alb  3.7  /  TBili  0.5  /  DBili  x   /  AST  29  /  ALT  15  /  AlkPhos  65  11-14    PT/INR - ( 13 Nov 2024 13:43 )   PT: 13.0 sec;   INR: 1.13 ratio         PTT - ( 13 Nov 2024 13:43 )  PTT:30.0 sec    acetaminophen     Tablet .. 650 milliGRAM(s) Oral every 6 hours  albuterol/ipratropium for Nebulization 3 milliLiter(s) Nebulizer every 6 hours PRN  aMIOdarone    Tablet 400 milliGRAM(s) Oral two times a day  ascorbic acid 500 milliGRAM(s) Oral two times a day  aspirin enteric coated 81 milliGRAM(s) Oral daily  atorvastatin 80 milliGRAM(s) Oral at bedtime  bisacodyl Suppository 10 milliGRAM(s) Rectal once  chlorhexidine 0.12% Liquid 15 milliLiter(s) Oral Mucosa every 12 hours  chlorhexidine 2% Cloths 1 Application(s) Topical daily  dextrose 50% Injectable 25 milliLiter(s) IV Push every 15 minutes  dextrose 50% Injectable 50 milliLiter(s) IV Push every 15 minutes  enoxaparin Injectable 40 milliGRAM(s) SubCutaneous every 24 hours  folic acid 1 milliGRAM(s) Oral daily  gabapentin 100 milliGRAM(s) Oral every 8 hours  HYDROmorphone  Injectable 0.5 milliGRAM(s) IV Push every 6 hours PRN  influenza  Vaccine (HIGH DOSE) 0.5 milliLiter(s) IntraMuscular once  insulin lispro (ADMELOG) corrective regimen sliding scale   SubCutaneous three times a day before meals  metoprolol tartrate 25 milliGRAM(s) Oral two times a day  oxyCODONE    IR 5 milliGRAM(s) Oral every 4 hours PRN  oxyCODONE    IR 10 milliGRAM(s) Oral every 4 hours PRN  pantoprazole    Tablet 40 milliGRAM(s) Oral before breakfast  polyethylene glycol 3350 17 Gram(s) Oral daily  potassium chloride  10 mEq/50 mL IVPB 10 milliEquivalent(s) IV Intermittent every 1 hour  potassium chloride  10 mEq/50 mL IVPB 10 milliEquivalent(s) IV Intermittent every 1 hour  potassium chloride  10 mEq/50 mL IVPB 10 milliEquivalent(s) IV Intermittent every 1 hour  senna 2 Tablet(s) Oral at bedtime  sodium chloride 0.9% lock flush 3 milliLiter(s) IV Push every 8 hours  sodium chloride 0.9%. 1000 milliLiter(s) IV Continuous <Continuous>  thiamine 100 milliGRAM(s) Oral daily      PHYSICAL EXAM    Subjective: "Hi.   Neurology: alert and oriented x 3, nonfocal, no gross deficits  CV : tele:  RSR  Sternal Wound :  CDI with dressing , Stable  Lungs: clear. RR easy, unlabored   Abdomen: soft, nontender, nondistended, positive bowel sounds, bowel movement   Neg N/V/D   :  pt voiding without difficulty   Extremities:   PINTO; edema, neg calf tenderness.   PPP bilaterally      PW:  Chest tubes:                 VITAL SIGNS    Telemetry:  rsr 60-90  Vital Signs Last 24 Hrs  T(C): 37.6 (11-15-24 @ 04:29), Max: 37.6 (11-14-24 @ 08:00)  T(F): 99.6 (11-15-24 @ 04:29), Max: 99.7 (11-14-24 @ 08:00)  HR: 89 (11-15-24 @ 04:29) (65 - 96)  BP: 129/74 (11-15-24 @ 04:29) (101/63 - 129/74)  RR: 18 (11-15-24 @ 04:29) (18 - 54)  SpO2: 95% (11-15-24 @ 04:29) (95% - 99%)            11-14 @ 07:01  -  11-15 @ 07:00  --------------------------------------------------------  IN: 843 mL / OUT: 1655 mL / NET: -812 mL       Daily     Daily   Admit Wt: Drug Dosing Weight  Height (cm): 182.8 (13 Nov 2024 08:50)  Weight (kg): 92.1 (13 Nov 2024 08:50)  BMI (kg/m2): 27.6 (13 Nov 2024 08:50)  BSA (m2): 2.14 (13 Nov 2024 08:50)      CAPILLARY BLOOD GLUCOSE  202 (14 Nov 2024 11:00)      POCT Blood Glucose.: 112 mg/dL (14 Nov 2024 21:56)  POCT Blood Glucose.: 128 mg/dL (14 Nov 2024 16:49)  POCT Blood Glucose.: 202 mg/dL (14 Nov 2024 10:46)          LABS:    11-14 @ 07:01  -  11-15 @ 07:00  --------------------------------------------------------  IN: 843 mL / OUT: 1655 mL / NET: -812 mL    cret                        10.8   9.26  )-----------( 111      ( 14 Nov 2024 00:49 )             32.9     11-14    139  |  106  |  16  ----------------------------<  136[H]  4.5   |  20[L]  |  0.94    Ca    9.0      14 Nov 2024 00:50  Phos  4.4     11-14  Mg     2.2     11-14    TPro  6.1  /  Alb  3.7  /  TBili  0.5  /  DBili  x   /  AST  29  /  ALT  15  /  AlkPhos  65  11-14    PT/INR - ( 13 Nov 2024 13:43 )   PT: 13.0 sec;   INR: 1.13 ratio         PTT - ( 13 Nov 2024 13:43 )  PTT:30.0 sec    acetaminophen     Tablet .. 650 milliGRAM(s) Oral every 6 hours  albuterol/ipratropium for Nebulization 3 milliLiter(s) Nebulizer every 6 hours PRN  aMIOdarone    Tablet 400 milliGRAM(s) Oral two times a day  ascorbic acid 500 milliGRAM(s) Oral two times a day  aspirin enteric coated 81 milliGRAM(s) Oral daily  atorvastatin 80 milliGRAM(s) Oral at bedtime  bisacodyl Suppository 10 milliGRAM(s) Rectal once  chlorhexidine 0.12% Liquid 15 milliLiter(s) Oral Mucosa every 12 hours  chlorhexidine 2% Cloths 1 Application(s) Topical daily  dextrose 50% Injectable 25 milliLiter(s) IV Push every 15 minutes  dextrose 50% Injectable 50 milliLiter(s) IV Push every 15 minutes  enoxaparin Injectable 40 milliGRAM(s) SubCutaneous every 24 hours  folic acid 1 milliGRAM(s) Oral daily  gabapentin 100 milliGRAM(s) Oral every 8 hours  HYDROmorphone  Injectable 0.5 milliGRAM(s) IV Push every 6 hours PRN  influenza  Vaccine (HIGH DOSE) 0.5 milliLiter(s) IntraMuscular once  insulin lispro (ADMELOG) corrective regimen sliding scale   SubCutaneous three times a day before meals  metoprolol tartrate 25 milliGRAM(s) Oral two times a day  oxyCODONE    IR 5 milliGRAM(s) Oral every 4 hours PRN  oxyCODONE    IR 10 milliGRAM(s) Oral every 4 hours PRN  pantoprazole    Tablet 40 milliGRAM(s) Oral before breakfast  polyethylene glycol 3350 17 Gram(s) Oral daily  potassium chloride  10 mEq/50 mL IVPB 10 milliEquivalent(s) IV Intermittent every 1 hour  potassium chloride  10 mEq/50 mL IVPB 10 milliEquivalent(s) IV Intermittent every 1 hour  potassium chloride  10 mEq/50 mL IVPB 10 milliEquivalent(s) IV Intermittent every 1 hour  senna 2 Tablet(s) Oral at bedtime  sodium chloride 0.9% lock flush 3 milliLiter(s) IV Push every 8 hours  sodium chloride 0.9%. 1000 milliLiter(s) IV Continuous <Continuous>  thiamine 100 milliGRAM(s) Oral daily      PHYSICAL EXAM    Subjective: "I feel ok."   Neurology: alert and oriented x 3, nonfocal, no gross deficits  CV : tele:  RSR 60-90  Sternal Wound :  CDI with dressing , Stable  Lungs: clear. RR easy, unlabored   Abdomen: soft, nontender, nondistended, positive bowel sounds, + bowel movement   Neg N/V/D   :  pt voiding without difficulty   Extremities:   PINTO; neg LE edema, neg calf tenderness; PPP bilaterally; left leg ace wrap     PW: d/c this am   Chest tubes: med and left ct--> draining minimal serous sang drainage

## 2024-11-15 NOTE — PROGRESS NOTE ADULT - PROBLEM SELECTOR PLAN 1
s/p C2L,JENIFER Clip  Continue Lop25 q12h  Continue LOV for dvt prophalaxsis  Pulm Toilet: IS and C&DB every hour  Increase activity , ambulate at least 3/day  Ct's to [facility] may d/c in am  May shower on POD 5  D/C planning to home when medically cleared Continue to monitor LFT's  Continue Vitamin C   Continue Folic acid  mvi  start librium 5 q8 as per Dr. Ervin today

## 2024-11-15 NOTE — SBIRT NOTE ADULT - NSSBIRTDRGPOSREINDET_GEN_A_CORE
Discussed benefits of current behavior and rate of usage, including effects on health and relationships.

## 2024-11-15 NOTE — PROGRESS NOTE ADULT - ASSESSMENT
75 yo male presents to [facility] prior to scheduled CABG x 3 on 11/13/24 with Dr. Laci Ervin. Pmhx HTN, HLD, Aneurysm of aortic root, AFib (not on AC), GERD, asthma (denies inhaler use), fatty liver, alcohol abuse (reports 4 glasses of wine/nightly for past 10 years; previously abstinent w/ help of AA; hx of cocaine abuse 30 years ago). Poor historian. Went for routine cardiology visit, found to have abnormal stress test. Cardiac cath performed on 10/24/24 revealed severe distal LM stenosis/multi-vessel disease.  Endorses occasional chest pain, dizziness and SALEEM w/ moderate exertion. Denies fever, chills, syncope, falls. Recently evaluated by Dr. Ervin and above surgery was recommended.  11/13 S/P C2L/JENIFER clip,  11/14  central line and marley d/c'brendan. angela d/c'd, transfer to floor 75 yo male presents to [facility] prior to scheduled CABG x 3 on 11/13/24 with Dr. Laci Ervin. Pmhx HTN, HLD, Aneurysm of aortic root, AFib (not on AC), GERD, asthma (denies inhaler use), fatty liver, alcohol abuse (reports 4 glasses of wine/nightly for past 10 years; previously abstinent w/ help of AA; hx of cocaine abuse 30 years ago). Poor historian. Went for routine cardiology visit, found to have abnormal stress test. Cardiac cath performed on 10/24/24 revealed severe distal LM stenosis/multi-vessel disease.  Endorses occasional chest pain, dizziness and SALEEM w/ moderate exertion. Denies fever, chills, syncope, falls. Recently evaluated by Dr. Ervin and above surgery was recommended.  11/13 S/P C2L/JENIFER clip,  11/14  central line and marley d/c'd. miller d/c'd, transfer to floor; pt voided s/p miller removal  11/15 VSS; RSR 60-90; diuresis as per Dr. Ervin; hx etoh- start librium today for hx etoh; d/c pw; maintain ct today as per Dr. Ervin   pulm toilet; continue duonebs/ ck chest xray in am; wean O2 as tolerated  discharge planning- home when stable

## 2024-11-16 LAB
ALBUMIN SERPL ELPH-MCNC: 3 G/DL — LOW (ref 3.3–5)
ALP SERPL-CCNC: 55 U/L — SIGNIFICANT CHANGE UP (ref 40–120)
ALT FLD-CCNC: 12 U/L — SIGNIFICANT CHANGE UP (ref 10–45)
ANION GAP SERPL CALC-SCNC: 12 MMOL/L — SIGNIFICANT CHANGE UP (ref 5–17)
AST SERPL-CCNC: 18 U/L — SIGNIFICANT CHANGE UP (ref 10–40)
BILIRUB SERPL-MCNC: 0.6 MG/DL — SIGNIFICANT CHANGE UP (ref 0.2–1.2)
BUN SERPL-MCNC: 12 MG/DL — SIGNIFICANT CHANGE UP (ref 7–23)
CALCIUM SERPL-MCNC: 8.8 MG/DL — SIGNIFICANT CHANGE UP (ref 8.4–10.5)
CHLORIDE SERPL-SCNC: 100 MMOL/L — SIGNIFICANT CHANGE UP (ref 96–108)
CO2 SERPL-SCNC: 23 MMOL/L — SIGNIFICANT CHANGE UP (ref 22–31)
CREAT SERPL-MCNC: 0.87 MG/DL — SIGNIFICANT CHANGE UP (ref 0.5–1.3)
EGFR: 92 ML/MIN/1.73M2 — SIGNIFICANT CHANGE UP
GLUCOSE BLDC GLUCOMTR-MCNC: 121 MG/DL — HIGH (ref 70–99)
GLUCOSE BLDC GLUCOMTR-MCNC: 141 MG/DL — HIGH (ref 70–99)
GLUCOSE SERPL-MCNC: 93 MG/DL — SIGNIFICANT CHANGE UP (ref 70–99)
HCT VFR BLD CALC: 26.6 % — LOW (ref 39–50)
HGB BLD-MCNC: 8.8 G/DL — LOW (ref 13–17)
MAGNESIUM SERPL-MCNC: 1.8 MG/DL — SIGNIFICANT CHANGE UP (ref 1.6–2.6)
MCHC RBC-ENTMCNC: 29.2 PG — SIGNIFICANT CHANGE UP (ref 27–34)
MCHC RBC-ENTMCNC: 33.1 G/DL — SIGNIFICANT CHANGE UP (ref 32–36)
MCV RBC AUTO: 88.4 FL — SIGNIFICANT CHANGE UP (ref 80–100)
NRBC # BLD: 0 /100 WBCS — SIGNIFICANT CHANGE UP (ref 0–0)
PHOSPHATE SERPL-MCNC: 2.1 MG/DL — LOW (ref 2.5–4.5)
PLATELET # BLD AUTO: 121 K/UL — LOW (ref 150–400)
POTASSIUM SERPL-MCNC: 3.9 MMOL/L — SIGNIFICANT CHANGE UP (ref 3.5–5.3)
POTASSIUM SERPL-SCNC: 3.9 MMOL/L — SIGNIFICANT CHANGE UP (ref 3.5–5.3)
PROT SERPL-MCNC: 5.9 G/DL — LOW (ref 6–8.3)
RBC # BLD: 3.01 M/UL — LOW (ref 4.2–5.8)
RBC # FLD: 14.3 % — SIGNIFICANT CHANGE UP (ref 10.3–14.5)
SODIUM SERPL-SCNC: 135 MMOL/L — SIGNIFICANT CHANGE UP (ref 135–145)
WBC # BLD: 9.06 K/UL — SIGNIFICANT CHANGE UP (ref 3.8–10.5)
WBC # FLD AUTO: 9.06 K/UL — SIGNIFICANT CHANGE UP (ref 3.8–10.5)

## 2024-11-16 PROCEDURE — 71045 X-RAY EXAM CHEST 1 VIEW: CPT | Mod: 26

## 2024-11-16 RX ORDER — METOPROLOL TARTRATE 100 MG/1
25 TABLET, FILM COATED ORAL ONCE
Refills: 0 | Status: COMPLETED | OUTPATIENT
Start: 2024-11-16 | End: 2024-11-16

## 2024-11-16 RX ORDER — SODIUM,POTASSIUM PHOSPHATES 278-250MG
2 POWDER IN PACKET (EA) ORAL ONCE
Refills: 0 | Status: COMPLETED | OUTPATIENT
Start: 2024-11-16 | End: 2024-11-16

## 2024-11-16 RX ORDER — METOPROLOL TARTRATE 100 MG/1
2.5 TABLET, FILM COATED ORAL ONCE
Refills: 0 | Status: COMPLETED | OUTPATIENT
Start: 2024-11-16 | End: 2024-11-16

## 2024-11-16 RX ORDER — METOPROLOL TARTRATE 100 MG/1
50 TABLET, FILM COATED ORAL
Refills: 0 | Status: DISCONTINUED | OUTPATIENT
Start: 2024-11-16 | End: 2024-11-18

## 2024-11-16 RX ADMIN — OXYCODONE HYDROCHLORIDE 10 MILLIGRAM(S): 30 TABLET ORAL at 08:31

## 2024-11-16 RX ADMIN — Medication 1 TABLET(S): at 12:04

## 2024-11-16 RX ADMIN — FUROSEMIDE 40 MILLIGRAM(S): 40 TABLET ORAL at 05:39

## 2024-11-16 RX ADMIN — Medication 81 MILLIGRAM(S): at 12:10

## 2024-11-16 RX ADMIN — ENOXAPARIN SODIUM 40 MILLIGRAM(S): 30 INJECTION SUBCUTANEOUS at 12:05

## 2024-11-16 RX ADMIN — METOPROLOL TARTRATE 25 MILLIGRAM(S): 100 TABLET, FILM COATED ORAL at 05:39

## 2024-11-16 RX ADMIN — SODIUM CHLORIDE 3 MILLILITER(S): 9 INJECTION, SOLUTION INTRAMUSCULAR; INTRAVENOUS; SUBCUTANEOUS at 06:58

## 2024-11-16 RX ADMIN — GABAPENTIN 100 MILLIGRAM(S): 300 CAPSULE ORAL at 05:40

## 2024-11-16 RX ADMIN — OXYCODONE HYDROCHLORIDE 10 MILLIGRAM(S): 30 TABLET ORAL at 09:31

## 2024-11-16 RX ADMIN — Medication 100 MILLIGRAM(S): at 12:04

## 2024-11-16 RX ADMIN — GABAPENTIN 100 MILLIGRAM(S): 300 CAPSULE ORAL at 21:19

## 2024-11-16 RX ADMIN — GABAPENTIN 100 MILLIGRAM(S): 300 CAPSULE ORAL at 13:24

## 2024-11-16 RX ADMIN — METOPROLOL TARTRATE 25 MILLIGRAM(S): 100 TABLET, FILM COATED ORAL at 12:01

## 2024-11-16 RX ADMIN — METOPROLOL TARTRATE 2.5 MILLIGRAM(S): 100 TABLET, FILM COATED ORAL at 11:57

## 2024-11-16 RX ADMIN — POLYETHYLENE GLYCOL 3350 17 GRAM(S): 17 POWDER, FOR SOLUTION ORAL at 12:05

## 2024-11-16 RX ADMIN — Medication 2 TABLET(S): at 14:01

## 2024-11-16 RX ADMIN — OXYCODONE HYDROCHLORIDE 10 MILLIGRAM(S): 30 TABLET ORAL at 23:18

## 2024-11-16 RX ADMIN — PANTOPRAZOLE SODIUM 40 MILLIGRAM(S): 40 TABLET, DELAYED RELEASE ORAL at 06:58

## 2024-11-16 RX ADMIN — OXYCODONE HYDROCHLORIDE 10 MILLIGRAM(S): 30 TABLET ORAL at 21:18

## 2024-11-16 RX ADMIN — METOPROLOL TARTRATE 50 MILLIGRAM(S): 100 TABLET, FILM COATED ORAL at 17:35

## 2024-11-16 RX ADMIN — Medication 500 MILLIGRAM(S): at 05:39

## 2024-11-16 RX ADMIN — Medication 1 MILLIGRAM(S): at 12:04

## 2024-11-16 RX ADMIN — CHLORHEXIDINE GLUCONATE 1 APPLICATION(S): 1.2 RINSE ORAL at 12:10

## 2024-11-16 RX ADMIN — Medication 25 MILLIGRAM(S): at 05:39

## 2024-11-16 RX ADMIN — Medication 2 TABLET(S): at 21:18

## 2024-11-16 RX ADMIN — ACETAMINOPHEN 500MG 650 MILLIGRAM(S): 500 TABLET, COATED ORAL at 13:05

## 2024-11-16 RX ADMIN — ACETAMINOPHEN 500MG 650 MILLIGRAM(S): 500 TABLET, COATED ORAL at 05:39

## 2024-11-16 RX ADMIN — SODIUM CHLORIDE 3 MILLILITER(S): 9 INJECTION, SOLUTION INTRAMUSCULAR; INTRAVENOUS; SUBCUTANEOUS at 13:23

## 2024-11-16 RX ADMIN — ACETAMINOPHEN 500MG 650 MILLIGRAM(S): 500 TABLET, COATED ORAL at 12:05

## 2024-11-16 RX ADMIN — Medication 100 GRAM(S): at 14:02

## 2024-11-16 RX ADMIN — Medication 80 MILLIGRAM(S): at 21:19

## 2024-11-16 RX ADMIN — Medication 5 MILLIGRAM(S): at 05:39

## 2024-11-16 RX ADMIN — AMIODARONE HYDROCHLORIDE 400 MILLIGRAM(S): 200 TABLET ORAL at 05:40

## 2024-11-16 RX ADMIN — Medication 5 MILLIGRAM(S): at 21:19

## 2024-11-16 RX ADMIN — MOMETASONE FUROATE 1 PUFF(S): 220 INHALANT RESPIRATORY (INHALATION) at 12:05

## 2024-11-16 RX ADMIN — SODIUM CHLORIDE 3 MILLILITER(S): 9 INJECTION, SOLUTION INTRAMUSCULAR; INTRAVENOUS; SUBCUTANEOUS at 20:13

## 2024-11-16 RX ADMIN — Medication 5 MILLIGRAM(S): at 13:24

## 2024-11-16 RX ADMIN — Medication 500 MILLIGRAM(S): at 17:34

## 2024-11-16 NOTE — PROGRESS NOTE ADULT - SUBJECTIVE AND OBJECTIVE BOX
VITAL SIGNS    Telemetry:    Vital Signs Last 24 Hrs  T(C): 36.5 (24 @ 11:55), Max: 37.5 (24 @ 05:21)  T(F): 97.7 (24 @ 11:55), Max: 99.5 (24 @ 05:21)  HR: 135 (24 @ 11:55) (74 - 135)  BP: 112/68 (24 @ 11:55) (101/62 - 129/78)  RR: 18 (24 @ 11:55) (18 - 18)  SpO2: 94% (24 @ 11:55) (94% - 95%)            11-15 @ 07:01  -   @ 07:00  --------------------------------------------------------  IN: 1020 mL / OUT: 3790 mL / NET: -2770 mL     @ 07:01  -   @ 14:59  --------------------------------------------------------  IN: 480 mL / OUT: 720 mL / NET: -240 mL       Daily     Daily Weight in k.6 (2024 07:16)  Admit Wt: Drug Dosing Weight  Height (cm): 182.8 (2024 08:50)  Weight (kg): 92.1 (2024 08:50)  BMI (kg/m2): 27.6 (2024 08:50)  BSA (m2): 2.14 (2024 08:50)    Bilirubin Total: 0.6 mg/dL ( @ 07:24)    CAPILLARY BLOOD GLUCOSE      POCT Blood Glucose.: 141 mg/dL (2024 11:13)  POCT Blood Glucose.: 121 mg/dL (2024 07:49)  POCT Blood Glucose.: 140 mg/dL (15 Nov 2024 20:54)  POCT Blood Glucose.: 122 mg/dL (15 Nov 2024 16:49)          MEDICATIONS  acetaminophen     Tablet .. 650 milliGRAM(s) Oral every 6 hours PRN  albuterol/ipratropium for Nebulization 3 milliLiter(s) Nebulizer every 6 hours PRN  ascorbic acid 500 milliGRAM(s) Oral two times a day  aspirin enteric coated 81 milliGRAM(s) Oral daily  atorvastatin 80 milliGRAM(s) Oral at bedtime  bisacodyl Suppository 10 milliGRAM(s) Rectal once  chlordiazePOXIDE 5 milliGRAM(s) Oral three times a day  chlorhexidine 2% Cloths 1 Application(s) Topical daily  enoxaparin Injectable 40 milliGRAM(s) SubCutaneous every 24 hours  folic acid 1 milliGRAM(s) Oral daily  furosemide    Tablet 40 milliGRAM(s) Oral daily  gabapentin 100 milliGRAM(s) Oral every 8 hours  influenza  Vaccine (HIGH DOSE) 0.5 milliLiter(s) IntraMuscular once  metoprolol tartrate 25 milliGRAM(s) Oral two times a day  mometasone 220 MICROgram(s) Inhaler 1 Puff(s) Inhalation daily  multivitamin 1 Tablet(s) Oral daily  oxyCODONE    IR 10 milliGRAM(s) Oral every 4 hours PRN  oxyCODONE    IR 5 milliGRAM(s) Oral every 4 hours PRN  pantoprazole    Tablet 40 milliGRAM(s) Oral before breakfast  polyethylene glycol 3350 17 Gram(s) Oral daily  senna 2 Tablet(s) Oral at bedtime  sodium chloride 0.9% lock flush 3 milliLiter(s) IV Push every 8 hours  spironolactone 25 milliGRAM(s) Oral daily  thiamine 100 milliGRAM(s) Oral daily      >>> <<<  PHYSICAL EXAM  Subjective: NAD OOB to chair  Neurology: alert and oriented x 3, nonfocal, no gross deficits  CV : s1s2  Sternal Wound :  CDI , Stable  Lungs: CTA mediastinal chest tube,to water seal  Abdomen: soft, NT,ND, (+ )BM  :  voiding  Extremities: -c/c/e      LABS  11-16    135  |  100  |  12  ----------------------------<  93  3.9   |  23  |  0.87    Ca    8.8      2024 07:24  Phos  2.1     11-16  Mg     1.8     11-16    TPro  5.9[L]  /  Alb  3.0[L]  /  TBili  0.6  /  DBili  x   /  AST  18  /  ALT  12  /  AlkPhos  55  11-16                                 8.8    9.06  )-----------( 121      ( 2024 07:24 )             26.6                 PAST MEDICAL & SURGICAL HISTORY:  HLD (hyperlipidemia)      HTN (hypertension)      PAF (paroxysmal atrial fibrillation)      GERD (gastroesophageal reflux disease)      AA (alcohol abuse)      Aortic root dilation      CAD (coronary artery disease)      S/P left inguinal hernia repair      S/P hemorrhoidectomy      History of cardiac cath

## 2024-11-16 NOTE — PROVIDER CONTACT NOTE (OTHER) - ASSESSMENT
Pt aox4, responsive, no s/s of distress observed/reported. BP WNL, asymptomatic. Afib on telemetry HR 130s.

## 2024-11-16 NOTE — PROGRESS NOTE ADULT - PROBLEM SELECTOR PLAN 3
continue postop care  continue asa and statin  continue bb lop 25 mg po bid  maintain ct as per Dr. Ervin today  diuresis  pulm toilet  pain management  bowel regimen  increase activity as tolerated  discharge planning- home when stable

## 2024-11-16 NOTE — PROGRESS NOTE ADULT - ASSESSMENT
75 yo male presents to [facility] prior to scheduled CABG x 3 on 11/13/24 with Dr. Laci Ervin. Pmhx HTN, HLD, Aneurysm of aortic root, AFib (not on AC), GERD, asthma (denies inhaler use), fatty liver, alcohol abuse (reports 4 glasses of wine/nightly for past 10 years; previously abstinent w/ help of AA; hx of cocaine abuse 30 years ago). Poor historian. Went for routine cardiology visit, found to have abnormal stress test. Cardiac cath performed on 10/24/24 revealed severe distal LM stenosis/multi-vessel disease.  Endorses occasional chest pain, dizziness and SALEEM w/ moderate exertion. Denies fever, chills, syncope, falls. Recently evaluated by Dr. Ervin and above surgery was recommended.  11/13 S/P C2L/JENIFER clip,  11/14  central line and marley d/c'd. miller d/c'd, transfer to floor; pt voided s/p miller removal  11/15 VSS; RSR 60-90; diuresis as per Dr. Ervin; hx etoh- start librium today for hx etoh; d/c pw; maintain ct today as per Dr. Ervin   pulm toilet; continue duonebs/ ck chest xray in am; wean O2 as tolerated  11/16 mediastinal and left pleural removed. Converted to afib this am. Lopressor increased.   discharge planning- home when stable

## 2024-11-16 NOTE — PROVIDER CONTACT NOTE (OTHER) - ACTION/TREATMENT ORDERED:
Lopressor 2.5mg ivp and 25mg po ordered and given. Pt converted back to NSR. Will continue to monitor.

## 2024-11-16 NOTE — PROGRESS NOTE ADULT - PROBLEM SELECTOR PLAN 2
Continue Lopressor 50 TID  May use intermittent Hydralazine  Keep pain under control w/ Tylenol, Liana, Robaxin and Oxycodone as needed

## 2024-11-16 NOTE — PROGRESS NOTE ADULT - PROBLEM SELECTOR PLAN 1
Continue to monitor LFT's  Continue Vitamin C   Continue Folic acid  mvi  start librium 5 q8 as per Dr. Ervin today

## 2024-11-17 LAB
ANION GAP SERPL CALC-SCNC: 10 MMOL/L — SIGNIFICANT CHANGE UP (ref 5–17)
BASOPHILS # BLD AUTO: 0.02 K/UL — SIGNIFICANT CHANGE UP (ref 0–0.2)
BASOPHILS NFR BLD AUTO: 0.3 % — SIGNIFICANT CHANGE UP (ref 0–2)
BUN SERPL-MCNC: 10 MG/DL — SIGNIFICANT CHANGE UP (ref 7–23)
CALCIUM SERPL-MCNC: 8.9 MG/DL — SIGNIFICANT CHANGE UP (ref 8.4–10.5)
CHLORIDE SERPL-SCNC: 103 MMOL/L — SIGNIFICANT CHANGE UP (ref 96–108)
CO2 SERPL-SCNC: 25 MMOL/L — SIGNIFICANT CHANGE UP (ref 22–31)
CREAT SERPL-MCNC: 0.9 MG/DL — SIGNIFICANT CHANGE UP (ref 0.5–1.3)
EGFR: 91 ML/MIN/1.73M2 — SIGNIFICANT CHANGE UP
EOSINOPHIL # BLD AUTO: 0.24 K/UL — SIGNIFICANT CHANGE UP (ref 0–0.5)
EOSINOPHIL NFR BLD AUTO: 3.1 % — SIGNIFICANT CHANGE UP (ref 0–6)
GLUCOSE SERPL-MCNC: 104 MG/DL — HIGH (ref 70–99)
HCT VFR BLD CALC: 26.4 % — LOW (ref 39–50)
HGB BLD-MCNC: 8.7 G/DL — LOW (ref 13–17)
IMM GRANULOCYTES NFR BLD AUTO: 0.3 % — SIGNIFICANT CHANGE UP (ref 0–0.9)
LYMPHOCYTES # BLD AUTO: 1.73 K/UL — SIGNIFICANT CHANGE UP (ref 1–3.3)
LYMPHOCYTES # BLD AUTO: 22.6 % — SIGNIFICANT CHANGE UP (ref 13–44)
MCHC RBC-ENTMCNC: 29 PG — SIGNIFICANT CHANGE UP (ref 27–34)
MCHC RBC-ENTMCNC: 33 G/DL — SIGNIFICANT CHANGE UP (ref 32–36)
MCV RBC AUTO: 88 FL — SIGNIFICANT CHANGE UP (ref 80–100)
MONOCYTES # BLD AUTO: 1.06 K/UL — HIGH (ref 0–0.9)
MONOCYTES NFR BLD AUTO: 13.9 % — SIGNIFICANT CHANGE UP (ref 2–14)
NEUTROPHILS # BLD AUTO: 4.57 K/UL — SIGNIFICANT CHANGE UP (ref 1.8–7.4)
NEUTROPHILS NFR BLD AUTO: 59.8 % — SIGNIFICANT CHANGE UP (ref 43–77)
NRBC # BLD: 0 /100 WBCS — SIGNIFICANT CHANGE UP (ref 0–0)
PLATELET # BLD AUTO: 153 K/UL — SIGNIFICANT CHANGE UP (ref 150–400)
POTASSIUM SERPL-MCNC: 3.6 MMOL/L — SIGNIFICANT CHANGE UP (ref 3.5–5.3)
POTASSIUM SERPL-SCNC: 3.6 MMOL/L — SIGNIFICANT CHANGE UP (ref 3.5–5.3)
RBC # BLD: 3 M/UL — LOW (ref 4.2–5.8)
RBC # FLD: 13.9 % — SIGNIFICANT CHANGE UP (ref 10.3–14.5)
SODIUM SERPL-SCNC: 138 MMOL/L — SIGNIFICANT CHANGE UP (ref 135–145)
WBC # BLD: 7.64 K/UL — SIGNIFICANT CHANGE UP (ref 3.8–10.5)
WBC # FLD AUTO: 7.64 K/UL — SIGNIFICANT CHANGE UP (ref 3.8–10.5)

## 2024-11-17 RX ORDER — AMIODARONE HYDROCHLORIDE 200 MG/1
TABLET ORAL
Refills: 0 | Status: DISCONTINUED | OUTPATIENT
Start: 2024-11-17 | End: 2024-11-18

## 2024-11-17 RX ORDER — AMIODARONE HYDROCHLORIDE 200 MG/1
400 TABLET ORAL EVERY 8 HOURS
Refills: 0 | Status: DISCONTINUED | OUTPATIENT
Start: 2024-11-17 | End: 2024-11-18

## 2024-11-17 RX ORDER — POTASSIUM CHLORIDE 600 MG/1
20 TABLET, EXTENDED RELEASE ORAL
Refills: 0 | Status: COMPLETED | OUTPATIENT
Start: 2024-11-17 | End: 2024-11-17

## 2024-11-17 RX ORDER — AMIODARONE HYDROCHLORIDE 200 MG/1
200 TABLET ORAL DAILY
Refills: 0 | Status: CANCELLED | OUTPATIENT
Start: 2024-11-21 | End: 2024-11-18

## 2024-11-17 RX ADMIN — Medication 5 MILLIGRAM(S): at 05:43

## 2024-11-17 RX ADMIN — FUROSEMIDE 40 MILLIGRAM(S): 40 TABLET ORAL at 05:42

## 2024-11-17 RX ADMIN — GABAPENTIN 100 MILLIGRAM(S): 300 CAPSULE ORAL at 14:53

## 2024-11-17 RX ADMIN — Medication 81 MILLIGRAM(S): at 10:04

## 2024-11-17 RX ADMIN — SODIUM CHLORIDE 3 MILLILITER(S): 9 INJECTION, SOLUTION INTRAMUSCULAR; INTRAVENOUS; SUBCUTANEOUS at 13:43

## 2024-11-17 RX ADMIN — METOPROLOL TARTRATE 50 MILLIGRAM(S): 100 TABLET, FILM COATED ORAL at 05:43

## 2024-11-17 RX ADMIN — OXYCODONE HYDROCHLORIDE 10 MILLIGRAM(S): 30 TABLET ORAL at 20:18

## 2024-11-17 RX ADMIN — SODIUM CHLORIDE 3 MILLILITER(S): 9 INJECTION, SOLUTION INTRAMUSCULAR; INTRAVENOUS; SUBCUTANEOUS at 21:09

## 2024-11-17 RX ADMIN — Medication 500 MILLIGRAM(S): at 17:11

## 2024-11-17 RX ADMIN — METOPROLOL TARTRATE 50 MILLIGRAM(S): 100 TABLET, FILM COATED ORAL at 17:11

## 2024-11-17 RX ADMIN — POTASSIUM CHLORIDE 20 MILLIEQUIVALENT(S): 600 TABLET, EXTENDED RELEASE ORAL at 10:04

## 2024-11-17 RX ADMIN — Medication 1 TABLET(S): at 10:05

## 2024-11-17 RX ADMIN — OXYCODONE HYDROCHLORIDE 10 MILLIGRAM(S): 30 TABLET ORAL at 21:09

## 2024-11-17 RX ADMIN — Medication 5 MILLIGRAM(S): at 20:18

## 2024-11-17 RX ADMIN — SODIUM CHLORIDE 3 MILLILITER(S): 9 INJECTION, SOLUTION INTRAMUSCULAR; INTRAVENOUS; SUBCUTANEOUS at 04:46

## 2024-11-17 RX ADMIN — Medication 5 MILLIGRAM(S): at 14:53

## 2024-11-17 RX ADMIN — ENOXAPARIN SODIUM 40 MILLIGRAM(S): 30 INJECTION SUBCUTANEOUS at 10:10

## 2024-11-17 RX ADMIN — MOMETASONE FUROATE 1 PUFF(S): 220 INHALANT RESPIRATORY (INHALATION) at 11:50

## 2024-11-17 RX ADMIN — POTASSIUM CHLORIDE 20 MILLIEQUIVALENT(S): 600 TABLET, EXTENDED RELEASE ORAL at 11:49

## 2024-11-17 RX ADMIN — Medication 2 TABLET(S): at 20:18

## 2024-11-17 RX ADMIN — Medication 25 MILLIGRAM(S): at 05:43

## 2024-11-17 RX ADMIN — PANTOPRAZOLE SODIUM 40 MILLIGRAM(S): 40 TABLET, DELAYED RELEASE ORAL at 05:43

## 2024-11-17 RX ADMIN — AMIODARONE HYDROCHLORIDE 400 MILLIGRAM(S): 200 TABLET ORAL at 12:24

## 2024-11-17 RX ADMIN — Medication 500 MILLIGRAM(S): at 05:42

## 2024-11-17 RX ADMIN — Medication 80 MILLIGRAM(S): at 20:16

## 2024-11-17 RX ADMIN — Medication 1 MILLIGRAM(S): at 10:05

## 2024-11-17 RX ADMIN — Medication 100 MILLIGRAM(S): at 10:04

## 2024-11-17 RX ADMIN — OXYCODONE HYDROCHLORIDE 10 MILLIGRAM(S): 30 TABLET ORAL at 11:00

## 2024-11-17 RX ADMIN — GABAPENTIN 100 MILLIGRAM(S): 300 CAPSULE ORAL at 20:18

## 2024-11-17 RX ADMIN — OXYCODONE HYDROCHLORIDE 10 MILLIGRAM(S): 30 TABLET ORAL at 10:09

## 2024-11-17 RX ADMIN — GABAPENTIN 100 MILLIGRAM(S): 300 CAPSULE ORAL at 05:43

## 2024-11-17 RX ADMIN — AMIODARONE HYDROCHLORIDE 400 MILLIGRAM(S): 200 TABLET ORAL at 20:15

## 2024-11-17 NOTE — PROGRESS NOTE ADULT - PROBLEM SELECTOR PLAN 3
continue postop care  continue asa and statin  continue bb lop 50 mg po bid  Start Amio load   diuresis  pulm toilet  pain management  bowel regimen  increase activity as tolerated  discharge planning- home when stable

## 2024-11-17 NOTE — PROGRESS NOTE ADULT - ASSESSMENT
75 yo male presents to [facility] prior to scheduled CABG x 3 on 11/13/24 with Dr. Laci Ervin. Pmhx HTN, HLD, Aneurysm of aortic root, AFib (not on AC), GERD, asthma (denies inhaler use), fatty liver, alcohol abuse (reports 4 glasses of wine/nightly for past 10 years; previously abstinent w/ help of AA; hx of cocaine abuse 30 years ago). Poor historian. Went for routine cardiology visit, found to have abnormal stress test. Cardiac cath performed on 10/24/24 revealed severe distal LM stenosis/multi-vessel disease.  Endorses occasional chest pain, dizziness and SALEEM w/ moderate exertion. Denies fever, chills, syncope, falls. Recently evaluated by Dr. Ervin and above surgery was recommended.  11/13 S/P C2L/JENIFER clip,  11/14  central line and marley d/c'd. miller d/c'd, transfer to floor; pt voided s/p miller removal  11/15 VSS; RSR 60-90; diuresis as per Dr. Ervin; hx etoh- start librium today for hx etoh; d/c pw; maintain ct today as per Dr. Ervin   pulm toilet; continue duonebs/ ck chest xray in am; wean O2 as tolerated  11/16 mediastinal and left pleural removed. Converted to afib this am. Lopressor increased.   11/17 Converted back to Afib> start Amio load   Cont betablockers  discharge planning- home when stable

## 2024-11-17 NOTE — PROGRESS NOTE ADULT - SUBJECTIVE AND OBJECTIVE BOX
Subjective:      Tele:             V/S:                    T(F): 98.3 (24 @ 11:12), Max: 99.1 (24 @ 05:33)  HR: 79 (24 @ 11:12) (74 - 79)  BP: 133/67 (24 @ 11:12) (115/75 - 133/67)  RR: 18 (24 @ 11:12) (18 - 18)  SpO2: 94% (24 @ 11:12) (89% - 95%)  Wt(kg): --      LV EF:      Labs:      138  |  103  |  10  ----------------------------<  104[H]  3.6   |  25  |  0.90    Ca    8.9      2024 05:52  Phos  2.1       Mg     1.8         TPro  5.9[L]  /  Alb  3.0[L]  /  TBili  0.6  /  DBili  x   /  AST  18  /  ALT  12  /  AlkPhos  55                                 8.7    7.64  )-----------( 153      ( 2024 05:51 )             26.4             CAPILLARY BLOOD GLUCOSE               CXR:    I&O's Detail    2024 07:01  -  2024 07:00  --------------------------------------------------------  IN:    Oral Fluid: 800 mL  Total IN: 800 mL    OUT:    Chest Tube (mL): 20 mL    Chest Tube (mL): 20 mL    Voided (mL): 720 mL  Total OUT: 760 mL    Total NET: 40 mL      2024 07:01  -  2024 12:29  --------------------------------------------------------  IN:    Oral Fluid: 480 mL  Total IN: 480 mL    OUT:    Voided (mL): 1375 mL  Total OUT: 1375 mL    Total NET: -895 mL    BOWEL MOVEMENT:  [ ] YES [x ] NO      Daily     Daily Weight in k.7 (2024 07:20)  Medications:  acetaminophen     Tablet .. 650 milliGRAM(s) Oral every 6 hours PRN  albuterol/ipratropium for Nebulization 3 milliLiter(s) Nebulizer every 6 hours PRN  aMIOdarone    Tablet   Oral   aMIOdarone    Tablet 400 milliGRAM(s) Oral every 8 hours  ascorbic acid 500 milliGRAM(s) Oral two times a day  aspirin enteric coated 81 milliGRAM(s) Oral daily  atorvastatin 80 milliGRAM(s) Oral at bedtime  bisacodyl Suppository 10 milliGRAM(s) Rectal once  chlordiazePOXIDE 5 milliGRAM(s) Oral three times a day  enoxaparin Injectable 40 milliGRAM(s) SubCutaneous every 24 hours  folic acid 1 milliGRAM(s) Oral daily  furosemide    Tablet 40 milliGRAM(s) Oral daily  gabapentin 100 milliGRAM(s) Oral every 8 hours  influenza  Vaccine (HIGH DOSE) 0.5 milliLiter(s) IntraMuscular once  metoprolol tartrate 50 milliGRAM(s) Oral two times a day  mometasone 220 MICROgram(s) Inhaler 1 Puff(s) Inhalation daily  multivitamin 1 Tablet(s) Oral daily  oxyCODONE    IR 5 milliGRAM(s) Oral every 4 hours PRN  oxyCODONE    IR 10 milliGRAM(s) Oral every 4 hours PRN  pantoprazole    Tablet 40 milliGRAM(s) Oral before breakfast  polyethylene glycol 3350 17 Gram(s) Oral daily  senna 2 Tablet(s) Oral at bedtime  sodium chloride 0.9% lock flush 3 milliLiter(s) IV Push every 8 hours  spironolactone 25 milliGRAM(s) Oral daily  thiamine 100 milliGRAM(s) Oral daily        Physical Exam:    Neurology: alert and oriented x 3  CV : s1s2  Sternal Wound :  CDI , Stable  Lungs: Clear bilaterally, Using incentive spirometry  Abdomen: soft, NT,ND, (+ )BM  Extremities:  trace lower extrem edema                  PAST MEDICAL & SURGICAL HISTORY:  HLD (hyperlipidemia)      HTN (hypertension)      PAF (paroxysmal atrial fibrillation)      GERD (gastroesophageal reflux disease)      AA (alcohol abuse)      Aortic root dilation      CAD (coronary artery disease)      S/P left inguinal hernia repair      S/P hemorrhoidectomy      History of cardiac cath

## 2024-11-17 NOTE — PROGRESS NOTE ADULT - PROBLEM SELECTOR PLAN 2
Continue Lopressor 50 BID  May use intermittent Hydralazine  Keep pain under control w/ Tylenol, Liana, Robaxin and Oxycodone as needed

## 2024-11-18 ENCOUNTER — TRANSCRIPTION ENCOUNTER (OUTPATIENT)
Age: 74
End: 2024-11-18

## 2024-11-18 VITALS
SYSTOLIC BLOOD PRESSURE: 117 MMHG | RESPIRATION RATE: 18 BRPM | OXYGEN SATURATION: 93 % | DIASTOLIC BLOOD PRESSURE: 71 MMHG | HEART RATE: 76 BPM

## 2024-11-18 PROBLEM — I25.10 ATHEROSCLEROTIC HEART DISEASE OF NATIVE CORONARY ARTERY WITHOUT ANGINA PECTORIS: Chronic | Status: ACTIVE | Noted: 2024-11-07

## 2024-11-18 LAB
ALBUMIN SERPL ELPH-MCNC: 3 G/DL — LOW (ref 3.3–5)
ALP SERPL-CCNC: 66 U/L — SIGNIFICANT CHANGE UP (ref 40–120)
ALT FLD-CCNC: 15 U/L — SIGNIFICANT CHANGE UP (ref 10–45)
ANION GAP SERPL CALC-SCNC: 10 MMOL/L — SIGNIFICANT CHANGE UP (ref 5–17)
AST SERPL-CCNC: 15 U/L — SIGNIFICANT CHANGE UP (ref 10–40)
BASOPHILS # BLD AUTO: 0.03 K/UL — SIGNIFICANT CHANGE UP (ref 0–0.2)
BASOPHILS NFR BLD AUTO: 0.4 % — SIGNIFICANT CHANGE UP (ref 0–2)
BILIRUB SERPL-MCNC: 0.5 MG/DL — SIGNIFICANT CHANGE UP (ref 0.2–1.2)
BUN SERPL-MCNC: 13 MG/DL — SIGNIFICANT CHANGE UP (ref 7–23)
CALCIUM SERPL-MCNC: 9 MG/DL — SIGNIFICANT CHANGE UP (ref 8.4–10.5)
CHLORIDE SERPL-SCNC: 103 MMOL/L — SIGNIFICANT CHANGE UP (ref 96–108)
CO2 SERPL-SCNC: 25 MMOL/L — SIGNIFICANT CHANGE UP (ref 22–31)
CREAT SERPL-MCNC: 0.95 MG/DL — SIGNIFICANT CHANGE UP (ref 0.5–1.3)
EGFR: 85 ML/MIN/1.73M2 — SIGNIFICANT CHANGE UP
EOSINOPHIL # BLD AUTO: 0.26 K/UL — SIGNIFICANT CHANGE UP (ref 0–0.5)
EOSINOPHIL NFR BLD AUTO: 3.7 % — SIGNIFICANT CHANGE UP (ref 0–6)
GLUCOSE SERPL-MCNC: 101 MG/DL — HIGH (ref 70–99)
HCT VFR BLD CALC: 26.9 % — LOW (ref 39–50)
HGB BLD-MCNC: 9.1 G/DL — LOW (ref 13–17)
IMM GRANULOCYTES NFR BLD AUTO: 0.6 % — SIGNIFICANT CHANGE UP (ref 0–0.9)
LYMPHOCYTES # BLD AUTO: 1.57 K/UL — SIGNIFICANT CHANGE UP (ref 1–3.3)
LYMPHOCYTES # BLD AUTO: 22.1 % — SIGNIFICANT CHANGE UP (ref 13–44)
MCHC RBC-ENTMCNC: 29.6 PG — SIGNIFICANT CHANGE UP (ref 27–34)
MCHC RBC-ENTMCNC: 33.8 G/DL — SIGNIFICANT CHANGE UP (ref 32–36)
MCV RBC AUTO: 87.6 FL — SIGNIFICANT CHANGE UP (ref 80–100)
MONOCYTES # BLD AUTO: 1.1 K/UL — HIGH (ref 0–0.9)
MONOCYTES NFR BLD AUTO: 15.5 % — HIGH (ref 2–14)
NEUTROPHILS # BLD AUTO: 4.1 K/UL — SIGNIFICANT CHANGE UP (ref 1.8–7.4)
NEUTROPHILS NFR BLD AUTO: 57.7 % — SIGNIFICANT CHANGE UP (ref 43–77)
NRBC # BLD: 0 /100 WBCS — SIGNIFICANT CHANGE UP (ref 0–0)
PLATELET # BLD AUTO: 183 K/UL — SIGNIFICANT CHANGE UP (ref 150–400)
POTASSIUM SERPL-MCNC: 3.5 MMOL/L — SIGNIFICANT CHANGE UP (ref 3.5–5.3)
POTASSIUM SERPL-SCNC: 3.5 MMOL/L — SIGNIFICANT CHANGE UP (ref 3.5–5.3)
PROT SERPL-MCNC: 5.9 G/DL — LOW (ref 6–8.3)
RBC # BLD: 3.07 M/UL — LOW (ref 4.2–5.8)
RBC # FLD: 13.9 % — SIGNIFICANT CHANGE UP (ref 10.3–14.5)
SODIUM SERPL-SCNC: 138 MMOL/L — SIGNIFICANT CHANGE UP (ref 135–145)
WBC # BLD: 7.1 K/UL — SIGNIFICANT CHANGE UP (ref 3.8–10.5)
WBC # FLD AUTO: 7.1 K/UL — SIGNIFICANT CHANGE UP (ref 3.8–10.5)

## 2024-11-18 PROCEDURE — 84295 ASSAY OF SERUM SODIUM: CPT

## 2024-11-18 PROCEDURE — 82435 ASSAY OF BLOOD CHLORIDE: CPT

## 2024-11-18 PROCEDURE — 97166 OT EVAL MOD COMPLEX 45 MIN: CPT

## 2024-11-18 PROCEDURE — 93010 ELECTROCARDIOGRAM REPORT: CPT

## 2024-11-18 PROCEDURE — 85025 COMPLETE CBC W/AUTO DIFF WBC: CPT

## 2024-11-18 PROCEDURE — C1889: CPT

## 2024-11-18 PROCEDURE — 85384 FIBRINOGEN ACTIVITY: CPT

## 2024-11-18 PROCEDURE — 85014 HEMATOCRIT: CPT

## 2024-11-18 PROCEDURE — 85027 COMPLETE CBC AUTOMATED: CPT

## 2024-11-18 PROCEDURE — 85610 PROTHROMBIN TIME: CPT

## 2024-11-18 PROCEDURE — 86891 AUTOLOGOUS BLOOD OP SALVAGE: CPT

## 2024-11-18 PROCEDURE — 36415 COLL VENOUS BLD VENIPUNCTURE: CPT

## 2024-11-18 PROCEDURE — 82803 BLOOD GASES ANY COMBINATION: CPT

## 2024-11-18 PROCEDURE — 85018 HEMOGLOBIN: CPT

## 2024-11-18 PROCEDURE — 82962 GLUCOSE BLOOD TEST: CPT

## 2024-11-18 PROCEDURE — C1769: CPT

## 2024-11-18 PROCEDURE — C1729: CPT

## 2024-11-18 PROCEDURE — 86900 BLOOD TYPING SEROLOGIC ABO: CPT

## 2024-11-18 PROCEDURE — 84132 ASSAY OF SERUM POTASSIUM: CPT

## 2024-11-18 PROCEDURE — 82550 ASSAY OF CK (CPK): CPT

## 2024-11-18 PROCEDURE — 97162 PT EVAL MOD COMPLEX 30 MIN: CPT

## 2024-11-18 PROCEDURE — 83605 ASSAY OF LACTIC ACID: CPT

## 2024-11-18 PROCEDURE — 71045 X-RAY EXAM CHEST 1 VIEW: CPT

## 2024-11-18 PROCEDURE — 94002 VENT MGMT INPAT INIT DAY: CPT

## 2024-11-18 PROCEDURE — 82947 ASSAY GLUCOSE BLOOD QUANT: CPT

## 2024-11-18 PROCEDURE — P9045: CPT

## 2024-11-18 PROCEDURE — 84100 ASSAY OF PHOSPHORUS: CPT

## 2024-11-18 PROCEDURE — 86850 RBC ANTIBODY SCREEN: CPT

## 2024-11-18 PROCEDURE — 94640 AIRWAY INHALATION TREATMENT: CPT

## 2024-11-18 PROCEDURE — 86901 BLOOD TYPING SEROLOGIC RH(D): CPT

## 2024-11-18 PROCEDURE — 97530 THERAPEUTIC ACTIVITIES: CPT

## 2024-11-18 PROCEDURE — 83735 ASSAY OF MAGNESIUM: CPT

## 2024-11-18 PROCEDURE — 86923 COMPATIBILITY TEST ELECTRIC: CPT

## 2024-11-18 PROCEDURE — 93325 DOPPLER ECHO COLOR FLOW MAPG: CPT

## 2024-11-18 PROCEDURE — 80048 BASIC METABOLIC PNL TOTAL CA: CPT

## 2024-11-18 PROCEDURE — 84484 ASSAY OF TROPONIN QUANT: CPT

## 2024-11-18 PROCEDURE — 93320 DOPPLER ECHO COMPLETE: CPT

## 2024-11-18 PROCEDURE — C1751: CPT

## 2024-11-18 PROCEDURE — 97116 GAIT TRAINING THERAPY: CPT

## 2024-11-18 PROCEDURE — 85520 HEPARIN ASSAY: CPT

## 2024-11-18 PROCEDURE — 82553 CREATINE MB FRACTION: CPT

## 2024-11-18 PROCEDURE — 85730 THROMBOPLASTIN TIME PARTIAL: CPT

## 2024-11-18 PROCEDURE — 80053 COMPREHEN METABOLIC PANEL: CPT

## 2024-11-18 PROCEDURE — 93005 ELECTROCARDIOGRAM TRACING: CPT

## 2024-11-18 PROCEDURE — 82330 ASSAY OF CALCIUM: CPT

## 2024-11-18 RX ORDER — SPIRONOLACTONE 25 MG
1 TABLET ORAL
Qty: 14 | Refills: 0
Start: 2024-11-18 | End: 2024-12-01

## 2024-11-18 RX ORDER — POTASSIUM CHLORIDE 600 MG/1
40 TABLET, EXTENDED RELEASE ORAL ONCE
Refills: 0 | Status: DISCONTINUED | OUTPATIENT
Start: 2024-11-18 | End: 2024-11-18

## 2024-11-18 RX ORDER — AMIODARONE HYDROCHLORIDE 200 MG/1
2 TABLET ORAL
Qty: 60 | Refills: 0
Start: 2024-11-18 | End: 2024-12-17

## 2024-11-18 RX ORDER — METOPROLOL TARTRATE 100 MG/1
1 TABLET, FILM COATED ORAL
Qty: 30 | Refills: 1
Start: 2024-11-18 | End: 2025-01-16

## 2024-11-18 RX ORDER — FUROSEMIDE 40 MG/1
1 TABLET ORAL
Qty: 14 | Refills: 0
Start: 2024-11-18 | End: 2024-12-01

## 2024-11-18 RX ORDER — OXYCODONE HYDROCHLORIDE 30 MG/1
1 TABLET ORAL
Qty: 15 | Refills: 0
Start: 2024-11-18 | End: 2024-11-22

## 2024-11-18 RX ORDER — METOPROLOL TARTRATE 100 MG/1
100 TABLET, FILM COATED ORAL DAILY
Refills: 0 | Status: DISCONTINUED | OUTPATIENT
Start: 2024-11-18 | End: 2024-11-18

## 2024-11-18 RX ADMIN — METOPROLOL TARTRATE 50 MILLIGRAM(S): 100 TABLET, FILM COATED ORAL at 04:52

## 2024-11-18 RX ADMIN — AMIODARONE HYDROCHLORIDE 400 MILLIGRAM(S): 200 TABLET ORAL at 04:51

## 2024-11-18 RX ADMIN — GABAPENTIN 100 MILLIGRAM(S): 300 CAPSULE ORAL at 04:52

## 2024-11-18 RX ADMIN — Medication 25 MILLIGRAM(S): at 04:51

## 2024-11-18 RX ADMIN — Medication 100 MILLIGRAM(S): at 11:21

## 2024-11-18 RX ADMIN — Medication 81 MILLIGRAM(S): at 11:21

## 2024-11-18 RX ADMIN — ENOXAPARIN SODIUM 40 MILLIGRAM(S): 30 INJECTION SUBCUTANEOUS at 11:22

## 2024-11-18 RX ADMIN — PANTOPRAZOLE SODIUM 40 MILLIGRAM(S): 40 TABLET, DELAYED RELEASE ORAL at 04:51

## 2024-11-18 RX ADMIN — Medication 1 MILLIGRAM(S): at 11:21

## 2024-11-18 RX ADMIN — FUROSEMIDE 40 MILLIGRAM(S): 40 TABLET ORAL at 04:52

## 2024-11-18 RX ADMIN — METOPROLOL TARTRATE 100 MILLIGRAM(S): 100 TABLET, FILM COATED ORAL at 13:29

## 2024-11-18 RX ADMIN — SODIUM CHLORIDE 3 MILLILITER(S): 9 INJECTION, SOLUTION INTRAMUSCULAR; INTRAVENOUS; SUBCUTANEOUS at 05:30

## 2024-11-18 RX ADMIN — OXYCODONE HYDROCHLORIDE 10 MILLIGRAM(S): 30 TABLET ORAL at 11:20

## 2024-11-18 RX ADMIN — Medication 1 TABLET(S): at 11:21

## 2024-11-18 RX ADMIN — MOMETASONE FUROATE 1 PUFF(S): 220 INHALANT RESPIRATORY (INHALATION) at 11:23

## 2024-11-18 RX ADMIN — AMIODARONE HYDROCHLORIDE 400 MILLIGRAM(S): 200 TABLET ORAL at 11:21

## 2024-11-18 RX ADMIN — Medication 500 MILLIGRAM(S): at 04:52

## 2024-11-18 RX ADMIN — Medication 5 MILLIGRAM(S): at 04:52

## 2024-11-18 NOTE — DISCHARGE NOTE NURSING/CASE MANAGEMENT/SOCIAL WORK - NSSCCONTNUM_GEN_ALL_CORE
[4] : a current pain level of 4/10 [Stable] : stable [Standing] : standing [Intermit.] : ~He/She~ states the symptoms seem to be intermittent [Bending] : worsened by bending [Walking] : worsened by walking [Knee Flexion] : worsened with knee flexion [Rest] : relieved by rest [Recumbency] : relieved by recumbency [de-identified] : 72 year old male presents for evaluation of left knee pain, secondary to endstage OA. He was last seen 7/24/2019 and received Euflexxa injection 1/3. He presents for Euflexxa injection 2/3. Left knee pain is localized medially. Pain is worse with walking. He denies swelling, clicking, buckling, locking. Not using any medication for pain.  [phone]

## 2024-11-18 NOTE — DISCHARGE NOTE PROVIDER - NSDCFUADDINST_GEN_ALL_CORE_FT
Wash incisions with soap and water using washcloth in shower daily,do not apply any lotion,powder,oil,sunscreen to any surgical incision  Please come to ED or Call Cardio thoracic office at [phone] if Chest pain, Shortness of Breath, persistant Nausea & vomiting, oozing from wounds,palpitations or pain not relieved with medication  Weigh yourself daily>notify surgeons office if  2 lb increase in weight in 24 hours.  1. Take ALL of your medications as ordered. Fill your prescriptions the day you are discharged and take according to the schedule you were given. Continue to take a stool softener if you are taking narcotic pain medications. AVOID medications such as ibuprofen or naproxen if you have had bypass surgery. If you have any questions or are unable to fill the prescriptions, please call the office right away at [phone].  2. Shower daily. Clean all incisions daily while showering with warm water and mild soap, pat dry with a clean towel and do not cover with any dressings unless instructed to. No bathing, swimming in a pool or the ocean until instructed by MD.  DO NOT use creams or lotions on the wound.  3. We advise that you do not drive until instructed by MD. Use your red pillow between chest and seatbelt to avoid injury in the event of a motor vehicle accident until you see the surgeon again in the office.  4. You may not return to work until instructed by MD.   5. Please eat a low fat, low cholesterol, low salt diet. (No added/extra salt). A nutritional supplement like Ensure or Glucerna (for diabetics) may help you reach your nutritional goals after surgery and aid in your recovery.  6. Weigh yourself every day in the morning and record it in the weight log in your red folder. Notify the office of any weight gain more than 2-3 pounds in 24 hours.  **Please LIMIT YOUR FLUID INTAKE TO ABOUT 4 8 OUNCE GLASSES OF BEVERAGE DAILY.**  7. Continue breathing exercises several times a day. Continue to use your heart pillow when coughing.  8. No heavy lifting nothing greater than 5 pounds until cleared by MD. We recommend that you sleep on your back and not your side or stomach for the next 4-6 weeks.  9. Call / Notify MD any fever greater than 101.0, any drainage from incisions or if they become red, hot or very tender to the touch.  10. Increase activity as tolerated. Walk indoors and/or outdoors at least 3 times a day.

## 2024-11-18 NOTE — DISCHARGE NOTE NURSING/CASE MANAGEMENT/SOCIAL WORK - PATIENT PORTAL LINK FT
You can access the FollowMyHealth Patient Portal offered by [facility] by registering at the following website: [email or web address]. By joining [facility]’s FollowMyHealth portal, you will also be able to view your health information using other applications (apps) compatible with our system.

## 2024-11-18 NOTE — DISCHARGE NOTE NURSING/CASE MANAGEMENT/SOCIAL WORK - NSDCPEFALRISK_GEN_ALL_CORE
For information on Fall & Injury Prevention, visit: [email or web address] OR  [email or web address] OR  https://www.cdc.gov/steadi/patient.html

## 2024-11-18 NOTE — DISCHARGE NOTE PROVIDER - CARE PROVIDERS DIRECT ADDRESSES
[email or web address]

## 2024-11-18 NOTE — DISCHARGE NOTE PROVIDER - HOSPITAL COURSE
75 yo male presents to PST prior to scheduled CABG x 3 on 11/13/24 with Dr. Laci Ervin. Pmhx HTN, HLD, Aneurysm of aortic root, AFib (not on AC), GERD, asthma (denies inhaler use), fatty liver, alcohol abuse (reports 4 glasses of wine/nightly for past 10 years; previously abstinent w/ help of AA; hx of cocaine abuse 30 years ago). Poor historian. Went for routine cardiology visit, found to have abnormal stress test. Cardiac cath performed on 10/24/24 revealed severe distal LM stenosis/multi-vessel disease.  Endorses occasional chest pain, dizziness and SALEEM w/ moderate exertion. Denies fever, chills, syncope, falls. Recently evaluated by Dr. Ervin and above surgery was recommended.  11/13 S/P C2L/JENIFER clip,  11/14  central line and marley d/c'd. miller d/c'd, transfer to floor; pt voided s/p miller removal  11/15 VSS; RSR 60-90; diuresis as per Dr. Ervin; hx etoh- start librium today for hx etoh; d/c pw; maintain ct today as per Dr. Ervin   pulm toilet; continue duonebs/ ck chest xray in am; wean O2 as tolerated  11/16 mediastinal and left pleural removed. Converted to afib this am. Lopressor increased.   11/17 Converted back to Afib> start Amio load   Cont betablockers  11/18 DC home Change to Toprol 100 qd

## 2024-11-18 NOTE — DISCHARGE NOTE PROVIDER - NSDCMRMEDTOKEN_GEN_ALL_CORE_FT
amiodarone 100 mg oral tablet: 2 tab(s) orally once a day  aspirin 81 mg oral delayed release tablet: 1 tab(s) orally once a day  atorvastatin 80 mg oral tablet: 1 tab(s) orally once a day (at bedtime)  ezetimibe 10 mg oral tablet: 1 tab(s) orally once a day  famotidine 40 mg oral tablet: 1 tab(s) orally once a day  furosemide 40 mg oral tablet: 1 tab(s) orally once a day  gabapentin 100 mg oral capsule: 1 cap(s) orally once a day  metoprolol succinate 100 mg oral tablet, extended release: 1 tab(s) orally once a day  oxyCODONE 5 mg oral tablet: 1 tab(s) orally every 8 hours MDD: 3  pantoprazole 40 mg oral delayed release tablet: 1 tab(s) orally once a day  spironolactone 25 mg oral tablet: 1 tab(s) orally once a day   amiodarone 100 mg oral tablet: 2 tab(s) orally once a day  aspirin 81 mg oral delayed release tablet: 1 tab(s) orally once a day  atorvastatin 80 mg oral tablet: 1 tab(s) orally once a day (at bedtime)  ezetimibe 10 mg oral tablet: 1 tab(s) orally once a day  famotidine 40 mg oral tablet: 1 tab(s) orally once a day  gabapentin 100 mg oral capsule: 1 cap(s) orally once a day  metoprolol succinate 100 mg oral tablet, extended release: 1 tab(s) orally once a day  oxyCODONE 5 mg oral tablet: 1 tab(s) orally every 8 hours MDD: 3  pantoprazole 40 mg oral delayed release tablet: 1 tab(s) orally once a day

## 2024-11-18 NOTE — PROGRESS NOTE ADULT - SUBJECTIVE AND OBJECTIVE BOX
Subjective: "Doing ok"  Am,bulated in chase      Tele:  SR  80s           V/S:                    T(F): 98.6 (11-18-24 @ 04:51), Max: 100.3 (11-17-24 @ 19:15)  HR: 71 (11-18-24 @ 04:51) (71 - 130)  BP: 117/71 (11-18-24 @ 04:51) (104/59 - 133/71)  RR: 18 (11-18-24 @ 04:51) (16 - 18)  SpO2: 92% (11-18-24 @ 04:51) (92% - 95%)  Wt(kg): --      LV EF:      Labs:  11-18    138  |  103  |  13  ----------------------------<  101[H]  3.5   |  25  |  0.95    Ca    9.0      18 Nov 2024 06:28    TPro  5.9[L]  /  Alb  3.0[L]  /  TBili  0.5  /  DBili  x   /  AST  15  /  ALT  15  /  AlkPhos  66  11-18                               9.1    7.10  )-----------( 183      ( 18 Nov 2024 06:27 )             26.9             CAPILLARY BLOOD GLUCOSE               CXR:    I&O's Detail    17 Nov 2024 07:01  -  18 Nov 2024 07:00  --------------------------------------------------------  IN:    Oral Fluid: 1100 mL  Total IN: 1100 mL    OUT:    Voided (mL): 2325 mL  Total OUT: 2325 mL    Total NET: -1225 mL      18 Nov 2024 07:01  -  18 Nov 2024 11:45  --------------------------------------------------------  IN:    Oral Fluid: 177 mL  Total IN: 177 mL    OUT:    Voided (mL): 1500 mL  Total OUT: 1500 mL    Total NET: -1323 mL      BOWEL MOVEMENT:  [x ] YES [ ] NO      Daily     Daily   Medications:  acetaminophen     Tablet .. 650 milliGRAM(s) Oral every 6 hours PRN  aMIOdarone    Tablet   Oral   aMIOdarone    Tablet 400 milliGRAM(s) Oral every 8 hours  aspirin enteric coated 81 milliGRAM(s) Oral daily  atorvastatin 80 milliGRAM(s) Oral at bedtime  bisacodyl Suppository 10 milliGRAM(s) Rectal once  chlordiazePOXIDE 5 milliGRAM(s) Oral three times a day  enoxaparin Injectable 40 milliGRAM(s) SubCutaneous every 24 hours  furosemide    Tablet 40 milliGRAM(s) Oral daily  metoprolol succinate  milliGRAM(s) Oral daily  mometasone 220 MICROgram(s) Inhaler 1 Puff(s) Inhalation daily  multivitamin 1 Tablet(s) Oral daily  oxyCODONE    IR 5 milliGRAM(s) Oral every 4 hours PRN  pantoprazole    Tablet 40 milliGRAM(s) Oral before breakfast  polyethylene glycol 3350 17 Gram(s) Oral daily  potassium chloride    Tablet ER 40 milliEquivalent(s) Oral once  senna 2 Tablet(s) Oral at bedtime  spironolactone 25 milliGRAM(s) Oral daily        Physical Exam:    Neurology: alert and oriented x 3  CV : s1s2  Sternal Wound :  CDI , Stable  Lungs: Clear bilaterally, Using incentive spirometry  Abdomen: soft, NT,ND, (+ )BM  Extremities:  trace lower extrem edema                  PAST MEDICAL & SURGICAL HISTORY:  HLD (hyperlipidemia)      HTN (hypertension)      PAF (paroxysmal atrial fibrillation)      GERD (gastroesophageal reflux disease)      AA (alcohol abuse)      Aortic root dilation      CAD (coronary artery disease)      S/P left inguinal hernia repair      S/P hemorrhoidectomy      History of cardiac cath

## 2024-11-18 NOTE — DISCHARGE NOTE PROVIDER - CARE PROVIDER_API CALL
Laci Ervin  Thoracic and Cardiac Surgery  [address] [place] [id number]  Phone: [phone]  Fax: [phone]  Scheduled Appointment: 11/27/2024 01:00 PM

## 2024-11-18 NOTE — DISCHARGE NOTE NURSING/CASE MANAGEMENT/SOCIAL WORK - NSDCFUADDAPPT_GEN_ALL_CORE_FT
Follow up appointment with Aziza Burks Nov 27  1 pm> Call assistant Yumiko  questions regarding post op visit  Cardiac surgery office at [facility] entrance 3  first floor on left after entering [facility]  Office telephone     Your Care Navigator Nurse Practitioner will be in touch to see you in your home within a few days from discharge. The Follow Your Heart program can help ensure you understand your medications, discharge instructions and answer any questions you may have at that time. They are also a great source to address concerns during the day and may be reached at [phone].

## 2024-11-18 NOTE — DISCHARGE NOTE NURSING/CASE MANAGEMENT/SOCIAL WORK - FINANCIAL ASSISTANCE
[facility] provides services at a reduced cost to those who are determined to be eligible through [facility]’s financial assistance program. Information regarding [facility]’s financial assistance program can be found by going to [email or web address] or by calling [phone].

## 2024-11-18 NOTE — DISCHARGE NOTE PROVIDER - NSDCFUSCHEDAPPT_GEN_ALL_CORE_FT
Laci Ervin Physician [facility]  CTSURG [address]  Scheduled Appointment: 11/27/2024

## 2024-11-18 NOTE — DISCHARGE NOTE PROVIDER - NSDCPNSUBOBJ_GEN_ALL_CORE
Progress Note:   · Provider Specialty  CT Surgery    Reason for Admission:   Reason for Admission:  · Reason for Admission  C2LIMA      · Subjective and Objective:   Subjective: "Doing ok"  Am,bulated in chase      Tele:  SR  80s           V/S:                    T(F): 98.6 (11-18-24 @ 04:51), Max: 100.3 (11-17-24 @ 19:15)  HR: 71 (11-18-24 @ 04:51) (71 - 130)  BP: 117/71 (11-18-24 @ 04:51) (104/59 - 133/71)  RR: 18 (11-18-24 @ 04:51) (16 - 18)  SpO2: 92% (11-18-24 @ 04:51) (92% - 95%)  Wt(kg): --      LV EF:      Labs:  11-18    138  |  103  |  13  ----------------------------<  101[H]  3.5   |  25  |  0.95    Ca    9.0      18 Nov 2024 06:28    TPro  5.9[L]  /  Alb  3.0[L]  /  TBili  0.5  /  DBili  x   /  AST  15  /  ALT  15  /  AlkPhos  66  11-18                               9.1    7.10  )-----------( 183      ( 18 Nov 2024 06:27 )             26.9             CAPILLARY BLOOD GLUCOSE               CXR:    I&O's Detail    17 Nov 2024 07:01  -  18 Nov 2024 07:00  --------------------------------------------------------  IN:    Oral Fluid: 1100 mL  Total IN: 1100 mL    OUT:    Voided (mL): 2325 mL  Total OUT: 2325 mL    Total NET: -1225 mL      18 Nov 2024 07:01  -  18 Nov 2024 11:45  --------------------------------------------------------  IN:    Oral Fluid: 177 mL  Total IN: 177 mL    OUT:    Voided (mL): 1500 mL  Total OUT: 1500 mL    Total NET: -1323 mL      BOWEL MOVEMENT:  [x ] YES [ ] NO      Daily     Daily   Medications:  acetaminophen     Tablet .. 650 milliGRAM(s) Oral every 6 hours PRN  aMIOdarone    Tablet   Oral   aMIOdarone    Tablet 400 milliGRAM(s) Oral every 8 hours  aspirin enteric coated 81 milliGRAM(s) Oral daily  atorvastatin 80 milliGRAM(s) Oral at bedtime  bisacodyl Suppository 10 milliGRAM(s) Rectal once  chlordiazePOXIDE 5 milliGRAM(s) Oral three times a day  enoxaparin Injectable 40 milliGRAM(s) SubCutaneous every 24 hours  furosemide    Tablet 40 milliGRAM(s) Oral daily  metoprolol succinate  milliGRAM(s) Oral daily  mometasone 220 MICROgram(s) Inhaler 1 Puff(s) Inhalation daily  multivitamin 1 Tablet(s) Oral daily  oxyCODONE    IR 5 milliGRAM(s) Oral every 4 hours PRN  pantoprazole    Tablet 40 milliGRAM(s) Oral before breakfast  polyethylene glycol 3350 17 Gram(s) Oral daily  potassium chloride    Tablet ER 40 milliEquivalent(s) Oral once  senna 2 Tablet(s) Oral at bedtime  spironolactone 25 milliGRAM(s) Oral daily        Physical Exam:    Neurology: alert and oriented x 3  CV : s1s2  Sternal Wound :  CDI , Stable  Lungs: Clear bilaterally, Using incentive spirometry  Abdomen: soft, NT,ND, (+ )BM  Extremities:  trace lower extrem edema                  PAST MEDICAL & SURGICAL HISTORY:  HLD (hyperlipidemia)      HTN (hypertension)      PAF (paroxysmal atrial fibrillation)      GERD (gastroesophageal reflux disease)      AA (alcohol abuse)      Aortic root dilation      CAD (coronary artery disease)      S/P left inguinal hernia repair      S/P hemorrhoidectomy      History of cardiac cath                      Assessment and Plan:   · Assessment    75 yo male presents to PST prior to scheduled CABG x 3 on 11/13/24 with Dr. Laci Ervin. Pmhx HTN, HLD, Aneurysm of aortic root, AFib (not on AC), GERD, asthma (denies inhaler use), fatty liver, alcohol abuse (reports 4 glasses of wine/nightly for past 10 years; previously abstinent w/ help of AA; hx of cocaine abuse 30 years ago). Poor historian. Went for routine cardiology visit, found to have abnormal stress test. Cardiac cath performed on 10/24/24 revealed severe distal LM stenosis/multi-vessel disease.  Endorses occasional chest pain, dizziness and SALEEM w/ moderate exertion. Denies fever, chills, syncope, falls. Recently evaluated by Dr. Ervin and above surgery was recommended.  11/13 S/P C2L/JENIFER clip,  11/14  central line and marley d/c'd. miller d/c'd, transfer to floor; pt voided s/p miller removal  11/15 VSS; RSR 60-90; diuresis as per Dr. Ervin; hx etoh- start librium today for hx etoh; d/c pw; maintain ct today as per Dr. Ervin   pulm toilet; continue duonebs/ ck chest xray in am; wean O2 as tolerated  11/16 mediastinal and left pleural removed. Converted to afib this am. Lopressor increased.   11/17 Converted back to Afib> start Amio load   Cont betablockers  11/18 DC home Change to Toprol 100 qd      Problem/Plan - 1:  ·  Problem: HTN (hypertension).   ·  Plan: Change betablocker Toprol 100 qd.    Problem/Plan - 2:  ·  Problem: S/P CABG x 2.   ·  Plan: continue postop care  continue asa and statin, betablocker  Start Amio load   diuresis  pulm toilet  pain management  DC home.      Electronic Signatures:  Marcelina Dorantes (NP)  (Signed 18-Nov-2024 11:49)  	Authored: Progress Note, Reason for Admission, Subjective and Objective, Assessment and Plan      Last Updated: 18-Nov-2024 11:49 by Marcelina Dorantes ([email or web address])

## 2024-11-18 NOTE — PROGRESS NOTE ADULT - PROBLEM SELECTOR PLAN 2
continue postop care  continue asa and statin, betablocker  Start Amio load   diuresis  pulm toilet  pain management  DC home

## 2024-11-18 NOTE — DISCHARGE NOTE PROVIDER - NSDCFUADDAPPT_GEN_ALL_CORE_FT
Follow up appointment with Aziza Burks Nov 27  1 pm> Call assistant Yumiko  questions regarding post op visit  Cardiac surgery office at [facility] entrance 3  first floor on left after entering [facility]  Office telephone     Your Care Navigator Nurse Practitioner will be in touch to see you in your home within a few days from discharge. The Follow Your Heart program can help ensure you understand your medications, discharge instructions and answer any questions you may have at that time. They are also a great source to address concerns during the day and may be reached at [phone].

## 2024-11-19 RX ORDER — METOPROLOL SUCCINATE 100 MG/1
100 TABLET, EXTENDED RELEASE ORAL DAILY
Refills: 0 | Status: ACTIVE | COMMUNITY

## 2024-11-20 ENCOUNTER — APPOINTMENT (OUTPATIENT)
Dept: CARE COORDINATION | Facility: HOME HEALTH | Age: 74
End: 2024-11-20
Payer: MEDICARE

## 2024-11-20 VITALS
OXYGEN SATURATION: 96 % | SYSTOLIC BLOOD PRESSURE: 128 MMHG | HEART RATE: 63 BPM | RESPIRATION RATE: 16 BRPM | DIASTOLIC BLOOD PRESSURE: 86 MMHG

## 2024-11-20 PROBLEM — Z95.1 S/P CABG X 2: Status: ACTIVE | Noted: 2024-11-20

## 2024-11-20 PROCEDURE — 99024 POSTOP FOLLOW-UP VISIT: CPT

## 2024-11-20 RX ORDER — AMIODARONE HYDROCHLORIDE 100 MG/1
100 TABLET ORAL DAILY
Refills: 0 | Status: DISCONTINUED | COMMUNITY
End: 2024-11-20

## 2024-11-20 RX ORDER — AMIODARONE HYDROCHLORIDE 200 MG/1
200 TABLET ORAL DAILY
Qty: 30 | Refills: 0 | Status: ACTIVE | COMMUNITY

## 2024-11-26 PROBLEM — Z09 POSTOPERATIVE FOLLOW-UP: Status: ACTIVE | Noted: 2024-11-26

## 2024-11-26 PROBLEM — Z98.890 S/P LEFT ATRIAL APPENDAGE LIGATION: Status: ACTIVE | Noted: 2024-11-26

## 2024-11-27 ENCOUNTER — APPOINTMENT (OUTPATIENT)
Dept: CARDIOTHORACIC SURGERY | Facility: CLINIC | Age: 74
End: 2024-11-27
Payer: MEDICARE

## 2024-11-27 VITALS
DIASTOLIC BLOOD PRESSURE: 71 MMHG | WEIGHT: 197 LBS | BODY MASS INDEX: 26.68 KG/M2 | SYSTOLIC BLOOD PRESSURE: 112 MMHG | RESPIRATION RATE: 16 BRPM | TEMPERATURE: 97.5 F | HEIGHT: 72 IN | OXYGEN SATURATION: 98 % | HEART RATE: 75 BPM

## 2024-11-27 DIAGNOSIS — Z95.1 PRESENCE OF AORTOCORONARY BYPASS GRAFT: ICD-10-CM

## 2024-11-27 DIAGNOSIS — Z98.890 OTHER SPECIFIED POSTPROCEDURAL STATES: ICD-10-CM

## 2024-11-27 DIAGNOSIS — Z09 ENCOUNTER FOR FOLLOW-UP EXAMINATION AFTER COMPLETED TREATMENT FOR CONDITIONS OTHER THAN MALIGNANT NEOPLASM: ICD-10-CM

## 2024-11-27 PROCEDURE — 99024 POSTOP FOLLOW-UP VISIT: CPT

## 2024-12-25 PROBLEM — F10.90 ALCOHOL USE: Status: ACTIVE | Noted: 2018-02-07

## 2025-01-28 ENCOUNTER — NON-APPOINTMENT (OUTPATIENT)
Age: 75
End: 2025-01-28

## (undated) DEVICE — SUT ETHIBOND 1 30" CT-1

## (undated) DEVICE — CONNECTOR STRAIGHT 3/8 X 3/8"

## (undated) DEVICE — BLOWER MISTER VIPER II

## (undated) DEVICE — VASOVIEW HEMOPRO 2

## (undated) DEVICE — SUT VICRYL 1 36" CTX UNDYED

## (undated) DEVICE — SYS VEIN HARVESTING VIRTUOSAPH PLUS W/ RADIAL

## (undated) DEVICE — SUT SOFSILK 0 18" TIES

## (undated) DEVICE — PACK CARDIAC YELLOW

## (undated) DEVICE — SAW BLADE MICROAIRE STERNUM 1X34X9.4MM

## (undated) DEVICE — BULLDOG SPRING CLIP LATIS/LATIS 6MM 1/2 FORCE (BLUE)

## (undated) DEVICE — DRSG OPSITE 2.5 X 2"

## (undated) DEVICE — SUT PROLENE 7-0 24" BV-1

## (undated) DEVICE — CONNECTOR STRAIGHT 3/8 X 1/2"

## (undated) DEVICE — WARMING BLANKET FULL ADULT

## (undated) DEVICE — TUBING SUCTION 20FT

## (undated) DEVICE — SUT SILK 0 30" TIES

## (undated) DEVICE — SUT STAINLESS STEEL 5 18" CCS

## (undated) DEVICE — GLV 8.5 PROTEXIS (WHITE)

## (undated) DEVICE — SUT BOOT STANDARD (ASSORTED) 5 PAIR

## (undated) DEVICE — SUT MONOCRYL 4-0 18" PS-2

## (undated) DEVICE — DRAPE IOBAN 33" X 23"

## (undated) DEVICE — FOLEY TRAY 16FR SURESTEP LF URINE METER TEMP SENSING STATLOCK

## (undated) DEVICE — DRSG TEGADERM 6 X 8"

## (undated) DEVICE — FOLEY TRAY 16FR 5CC LF LUBRISIL ADVANCE TEMP CLOSED

## (undated) DEVICE — PACK UNIVERSAL CARDIAC

## (undated) DEVICE — MEDTRONIC CLEARVIEW BLOWER MISTER KIT W TUBING SET

## (undated) DEVICE — DRSG OPSITE 13.75 X 4"

## (undated) DEVICE — SUT VICRYL 0 36" CTX UNDYED

## (undated) DEVICE — DRSG ACE BANDAGE 6"

## (undated) DEVICE — DRSG DERMABOND PRINEO 22CM

## (undated) DEVICE — SYR LUER LOK 50CC

## (undated) DEVICE — SUCTION YANKAUER BULBOUS TIP NO VENT

## (undated) DEVICE — SUT DOUBLE 6 WIRE STERNAL

## (undated) DEVICE — ELCTR REM POLYHESIVE ADULT PT RETURN 15FT

## (undated) DEVICE — GLV 8.5 PROTEXIS (BLUE)

## (undated) DEVICE — SYR LUER LOK 20CC

## (undated) DEVICE — SUT PROLENE 4-0 30" SH-1

## (undated) DEVICE — SUT PROLENE 8-0 24" BV175-6

## (undated) DEVICE — FILTER REINFUSION FOR SALVAGED BLOOD DISP

## (undated) DEVICE — SUT SILK 2-0 18" SH (POP-OFF)

## (undated) DEVICE — CHEST DRAIN PLEUR-EVAC DRY/WET ADULT-PEDS SINGLE (QUICK)

## (undated) DEVICE — TOURNIQUET SET 12FR (1 RED, 1 BLUE, 1 SNARE) 7"

## (undated) DEVICE — SYR TB 1CC 25G X 5/8 (BLUE)

## (undated) DEVICE — SOL IRR BAG NS 0.9% 3000ML

## (undated) DEVICE — Device

## (undated) DEVICE — SUT PROLENE 6-0 30" C-1

## (undated) DEVICE — AORTIC PUNCH 4.0MM LONG LENGTH HANDLE

## (undated) DEVICE — PACING CABLE (BROWN) A/V TEMP SCREW DOWN 12FT

## (undated) DEVICE — SYR LUER LOK 1CC

## (undated) DEVICE — SUT SOFSILK 0 30" TIES

## (undated) DEVICE — SYNOVIS VASCULAR PROBE 1.5MM 15CM

## (undated) DEVICE — BEAVER BLADE MINI SHARP ALL ROUND (BLUE)

## (undated) DEVICE — SAW BLADE STRYKER STERNUM 31MM X 6.27 X .79

## (undated) DEVICE — VESSEL LOOP MAXI-RED  0.120" X 16"

## (undated) DEVICE — DRSG KLING 6"

## (undated) DEVICE — VESSEL LOOP MAXI-BLUE 0.120" X 16"

## (undated) DEVICE — VENTING ADAPTER "Y" (RED/BLUE) 7.5"

## (undated) DEVICE — SYR LUER LOK 30CC

## (undated) DEVICE — SUT SILK 5-0 60" TIES

## (undated) DEVICE — TUBING KIT FAST START ATF 40

## (undated) DEVICE — SPECIMEN CONTAINER 100ML

## (undated) DEVICE — TUBING TRUWAVE PRESSURE MALE/FEMALE 72"

## (undated) DEVICE — DRAPE MAYO STAND 30"

## (undated) DEVICE — BLADE STERNUM 32X6.3X5.8MM

## (undated) DEVICE — SOL NORMOSOL-R PH7.4 1000ML

## (undated) DEVICE — NDL HYPO SAFE 18G X 1.5" (PINK)

## (undated) DEVICE — STABILIZER HAND ASSISTANT ATTACHMENT W STABLESOFT 2S

## (undated) DEVICE — DRSG DERMABOND PRINEO 60CM

## (undated) DEVICE — TUBING IV SET MICROCLAVE ADAPTER

## (undated) DEVICE — SOL IRR POUR NS 0.9% 500ML

## (undated) DEVICE — DRAPE TOWEL BLUE 17" X 24"

## (undated) DEVICE — ELCTR BOVIE TIP CLEANER SCRATCH PAD

## (undated) DEVICE — SUT VICRYL 3-0 27" CT-1

## (undated) DEVICE — PACK W INSPIRE OXYGENATOR W HEMOCONCENTRATOR

## (undated) DEVICE — STOPCOCK 4-WAY (BLUE) DISCOFIX SPIN-LOCK CONNECTOR

## (undated) DEVICE — SOL IRR POUR H2O 250ML

## (undated) DEVICE — PREP CHLORAPREP HI-LITE ORANGE 26ML

## (undated) DEVICE — DRAPE SLUSH / WARMER 44 X 66"

## (undated) DEVICE — ADAPTER "Y" RECIRCULATING MALE 1 LEG, WHITE CLAMP W COLOR CODED ARROWS 8"

## (undated) DEVICE — POSITIONER CARDIAC BUMP

## (undated) DEVICE — POSITIONER FOAM EGG CRATE ULNAR 2PCS (PINK)

## (undated) DEVICE — SUT PLEDGET SOFT LARGE 3/8" X 3/16" X 1/16" X6